# Patient Record
Sex: FEMALE | Race: BLACK OR AFRICAN AMERICAN | NOT HISPANIC OR LATINO | URBAN - METROPOLITAN AREA
[De-identification: names, ages, dates, MRNs, and addresses within clinical notes are randomized per-mention and may not be internally consistent; named-entity substitution may affect disease eponyms.]

---

## 2017-01-31 ENCOUNTER — OUTPATIENT (OUTPATIENT)
Dept: OUTPATIENT SERVICES | Facility: HOSPITAL | Age: 82
LOS: 1 days | Discharge: HOME | End: 2017-01-31

## 2017-02-04 DIAGNOSIS — M19.90 UNSPECIFIED OSTEOARTHRITIS, UNSPECIFIED SITE: ICD-10-CM

## 2017-02-04 DIAGNOSIS — M10.9 GOUT, UNSPECIFIED: ICD-10-CM

## 2017-02-04 DIAGNOSIS — Z87.891 PERSONAL HISTORY OF NICOTINE DEPENDENCE: ICD-10-CM

## 2017-02-04 DIAGNOSIS — H25.12 AGE-RELATED NUCLEAR CATARACT, LEFT EYE: ICD-10-CM

## 2017-02-04 DIAGNOSIS — I10 ESSENTIAL (PRIMARY) HYPERTENSION: ICD-10-CM

## 2017-09-16 ENCOUNTER — EMERGENCY (EMERGENCY)
Facility: HOSPITAL | Age: 82
LOS: 1 days | Discharge: HOME | End: 2017-09-16
Attending: EMERGENCY MEDICINE | Admitting: EMERGENCY MEDICINE
Payer: COMMERCIAL

## 2017-09-16 VITALS
SYSTOLIC BLOOD PRESSURE: 139 MMHG | RESPIRATION RATE: 16 BRPM | HEART RATE: 82 BPM | TEMPERATURE: 98 F | DIASTOLIC BLOOD PRESSURE: 58 MMHG | OXYGEN SATURATION: 100 %

## 2017-09-16 VITALS — RESPIRATION RATE: 18 BRPM | OXYGEN SATURATION: 99 %

## 2017-09-16 DIAGNOSIS — R00.2 PALPITATIONS: ICD-10-CM

## 2017-09-16 DIAGNOSIS — I10 ESSENTIAL (PRIMARY) HYPERTENSION: ICD-10-CM

## 2017-09-16 DIAGNOSIS — R35.8 OTHER POLYURIA: ICD-10-CM

## 2017-09-16 DIAGNOSIS — Z79.899 OTHER LONG TERM (CURRENT) DRUG THERAPY: ICD-10-CM

## 2017-09-16 DIAGNOSIS — R06.02 SHORTNESS OF BREATH: ICD-10-CM

## 2017-09-16 LAB
ALBUMIN SERPL ELPH-MCNC: 3.6 G/DL — SIGNIFICANT CHANGE UP (ref 3.3–5)
ALP SERPL-CCNC: 110 U/L — SIGNIFICANT CHANGE UP (ref 40–120)
ALT FLD-CCNC: 13 U/L — SIGNIFICANT CHANGE UP (ref 10–45)
ANION GAP SERPL CALC-SCNC: 16 MMOL/L — SIGNIFICANT CHANGE UP (ref 5–17)
APPEARANCE UR: CLEAR — SIGNIFICANT CHANGE UP
AST SERPL-CCNC: 33 U/L — SIGNIFICANT CHANGE UP (ref 10–40)
BASOPHILS NFR BLD AUTO: 0.4 % — SIGNIFICANT CHANGE UP (ref 0–2)
BILIRUB SERPL-MCNC: 0.2 MG/DL — SIGNIFICANT CHANGE UP (ref 0.2–1.2)
BILIRUB UR-MCNC: NEGATIVE — SIGNIFICANT CHANGE UP
BUN SERPL-MCNC: 18 MG/DL — SIGNIFICANT CHANGE UP (ref 7–23)
CALCIUM SERPL-MCNC: 9.2 MG/DL — SIGNIFICANT CHANGE UP (ref 8.4–10.5)
CHLORIDE SERPL-SCNC: 102 MMOL/L — SIGNIFICANT CHANGE UP (ref 96–108)
CO2 SERPL-SCNC: 21 MMOL/L — LOW (ref 22–31)
COLOR SPEC: YELLOW — SIGNIFICANT CHANGE UP
CREAT SERPL-MCNC: 0.87 MG/DL — SIGNIFICANT CHANGE UP (ref 0.5–1.3)
DIFF PNL FLD: NEGATIVE — SIGNIFICANT CHANGE UP
EOSINOPHIL NFR BLD AUTO: 0.9 % — SIGNIFICANT CHANGE UP (ref 0–6)
GLUCOSE SERPL-MCNC: 106 MG/DL — HIGH (ref 70–99)
GLUCOSE UR QL: NEGATIVE — SIGNIFICANT CHANGE UP
HCT VFR BLD CALC: 34.9 % — SIGNIFICANT CHANGE UP (ref 34.5–45)
HGB BLD-MCNC: 11.2 G/DL — LOW (ref 11.5–15.5)
KETONES UR-MCNC: NEGATIVE — SIGNIFICANT CHANGE UP
LEUKOCYTE ESTERASE UR-ACNC: (no result)
LYMPHOCYTES # BLD AUTO: 29.6 % — SIGNIFICANT CHANGE UP (ref 13–44)
MCHC RBC-ENTMCNC: 25.5 PG — LOW (ref 27–34)
MCHC RBC-ENTMCNC: 32.1 G/DL — SIGNIFICANT CHANGE UP (ref 32–36)
MCV RBC AUTO: 79.3 FL — LOW (ref 80–100)
MONOCYTES NFR BLD AUTO: 11.9 % — SIGNIFICANT CHANGE UP (ref 2–14)
NEUTROPHILS NFR BLD AUTO: 57.2 % — SIGNIFICANT CHANGE UP (ref 43–77)
NITRITE UR-MCNC: NEGATIVE — SIGNIFICANT CHANGE UP
NT-PROBNP SERPL-SCNC: 13 PG/ML — SIGNIFICANT CHANGE UP (ref 0–300)
PH UR: 6.5 — SIGNIFICANT CHANGE UP (ref 5–8)
PLATELET # BLD AUTO: 449 K/UL — HIGH (ref 150–400)
POTASSIUM SERPL-MCNC: 5.2 MMOL/L — SIGNIFICANT CHANGE UP (ref 3.5–5.3)
POTASSIUM SERPL-SCNC: 5.2 MMOL/L — SIGNIFICANT CHANGE UP (ref 3.5–5.3)
PROT SERPL-MCNC: 8.7 G/DL — HIGH (ref 6–8.3)
PROT UR-MCNC: NEGATIVE MG/DL — SIGNIFICANT CHANGE UP
RBC # BLD: 4.4 M/UL — SIGNIFICANT CHANGE UP (ref 3.8–5.2)
RBC # FLD: 17.7 % — HIGH (ref 10.3–16.9)
SODIUM SERPL-SCNC: 139 MMOL/L — SIGNIFICANT CHANGE UP (ref 135–145)
SP GR SPEC: <=1.005 — SIGNIFICANT CHANGE UP (ref 1–1.03)
TROPONIN T SERPL-MCNC: <0.01 NG/ML — SIGNIFICANT CHANGE UP (ref 0–0.01)
UROBILINOGEN FLD QL: 0.2 E.U./DL — SIGNIFICANT CHANGE UP
WBC # BLD: 8.2 K/UL — SIGNIFICANT CHANGE UP (ref 3.8–10.5)
WBC # FLD AUTO: 8.2 K/UL — SIGNIFICANT CHANGE UP (ref 3.8–10.5)

## 2017-09-16 PROCEDURE — 71046 X-RAY EXAM CHEST 2 VIEWS: CPT

## 2017-09-16 PROCEDURE — 80053 COMPREHEN METABOLIC PANEL: CPT

## 2017-09-16 PROCEDURE — 85025 COMPLETE CBC W/AUTO DIFF WBC: CPT

## 2017-09-16 PROCEDURE — 84484 ASSAY OF TROPONIN QUANT: CPT

## 2017-09-16 PROCEDURE — 93005 ELECTROCARDIOGRAM TRACING: CPT

## 2017-09-16 PROCEDURE — 83880 ASSAY OF NATRIURETIC PEPTIDE: CPT

## 2017-09-16 PROCEDURE — 93010 ELECTROCARDIOGRAM REPORT: CPT

## 2017-09-16 PROCEDURE — 99284 EMERGENCY DEPT VISIT MOD MDM: CPT | Mod: 25

## 2017-09-16 PROCEDURE — 87086 URINE CULTURE/COLONY COUNT: CPT

## 2017-09-16 PROCEDURE — 81001 URINALYSIS AUTO W/SCOPE: CPT

## 2017-09-16 PROCEDURE — 71020: CPT | Mod: 26

## 2017-09-16 PROCEDURE — 99285 EMERGENCY DEPT VISIT HI MDM: CPT | Mod: 25

## 2017-09-16 RX ORDER — SODIUM CHLORIDE 9 MG/ML
500 INJECTION INTRAMUSCULAR; INTRAVENOUS; SUBCUTANEOUS ONCE
Qty: 0 | Refills: 0 | Status: COMPLETED | OUTPATIENT
Start: 2017-09-16 | End: 2017-09-16

## 2017-09-16 RX ADMIN — SODIUM CHLORIDE 1000 MILLILITER(S): 9 INJECTION INTRAMUSCULAR; INTRAVENOUS; SUBCUTANEOUS at 14:54

## 2017-09-16 NOTE — ED ADULT NURSE NOTE - OBJECTIVE STATEMENT
Pt presents to ER c/o intermittent weakness x3-4 weeks with intermittent SOB x1 wk. Pt stated last night woke up to use the restroom and had palpitations with SOB. Pt denies pain, CP. No LE swelling. Pt also reports urinary frequency. NAD, will monitor and maintain safety.

## 2017-09-16 NOTE — ED PROVIDER NOTE - MEDICAL DECISION MAKING DETAILS
here w/ complaint of palpitations today, polyuria, intermittent SOB. Labs wnl. UA neg but culture sent. BS normal. pt very well appearing in ED, wants to go home. I feel like she is safe at this time. To walk into her PCP this week for further workup. Atypical for acs but ekg and trop neg, do not think warrants further workup.

## 2017-09-16 NOTE — ED PROVIDER NOTE - OBJECTIVE STATEMENT
86yo F hx of HTN, gout, here w/ complaint of intermittent SOB for the past 2 week, polyuria x1 week, and then palpitations today around 3am. Has had palpitations before, but states that the ones today were worse than usual. Feels dehydrated, is not sure why she is urinating so much. Denies dysuria. No hx of DM, but states that she was told that she is pre-dm. Called her PCP a few days ago to make appt but it was made for January. Was seen here about a month ago for pre syncope and had normal labs, CT head/CT-angio, and dc home as per pt request as she did not want an inpatient workup. 86yo F hx of HTN, gout, here w/ complaint of intermittent SOB for the past 2 week, polyuria x1 week, and then palpitations today around 3am. Has had palpitations before, but states that the ones today were worse than usual. Feels dehydrated, is not sure why she is urinating so much. Denies dysuria. No hx of DM, but states that she was told that she is pre-dm. Called her PCP a few days ago to make appt but it was made for January. Was seen here about a month ago for pre syncope and had normal labs, CT head/CT-angio, and dc home as per pt request as she did not want an inpatient workup. States that every morning almost she has palpitations in the setting of getting up quickly to go to the bathroom, which is what happened today as well. States that she has been told to slow down, but she still feels young and runs around everywhere. Lives alone but is very close to her next door neighbor, and family checks in on her very often. In addition she has HHA 5 hrs 5 days a week. Feels comfortable going home, and prefers to go home after workup if everything negative. Plans to walk in to clinic on Monday to f/u.

## 2017-09-17 LAB
CULTURE RESULTS: SIGNIFICANT CHANGE UP
SPECIMEN SOURCE: SIGNIFICANT CHANGE UP

## 2018-02-21 ENCOUNTER — OUTPATIENT (OUTPATIENT)
Dept: OUTPATIENT SERVICES | Facility: HOSPITAL | Age: 83
LOS: 1 days | End: 2018-02-21
Payer: MEDICARE

## 2018-02-21 DIAGNOSIS — R06.09 OTHER FORMS OF DYSPNEA: ICD-10-CM

## 2018-02-21 PROCEDURE — A9500: CPT

## 2018-02-21 PROCEDURE — A9505: CPT

## 2018-02-21 PROCEDURE — 78452 HT MUSCLE IMAGE SPECT MULT: CPT

## 2018-02-21 PROCEDURE — 93017 CV STRESS TEST TRACING ONLY: CPT

## 2018-04-21 ENCOUNTER — INPATIENT (INPATIENT)
Facility: HOSPITAL | Age: 83
LOS: 1 days | Discharge: HOME HEALTH | DRG: 312 | End: 2018-04-23
Attending: STUDENT IN AN ORGANIZED HEALTH CARE EDUCATION/TRAINING PROGRAM | Admitting: STUDENT IN AN ORGANIZED HEALTH CARE EDUCATION/TRAINING PROGRAM
Payer: MEDICARE

## 2018-04-21 VITALS
HEART RATE: 98 BPM | TEMPERATURE: 98 F | RESPIRATION RATE: 18 BRPM | DIASTOLIC BLOOD PRESSURE: 82 MMHG | OXYGEN SATURATION: 100 % | SYSTOLIC BLOOD PRESSURE: 130 MMHG

## 2018-04-21 DIAGNOSIS — E87.1 HYPO-OSMOLALITY AND HYPONATREMIA: ICD-10-CM

## 2018-04-21 DIAGNOSIS — I95.1 ORTHOSTATIC HYPOTENSION: ICD-10-CM

## 2018-04-21 DIAGNOSIS — Z29.9 ENCOUNTER FOR PROPHYLACTIC MEASURES, UNSPECIFIED: ICD-10-CM

## 2018-04-21 DIAGNOSIS — R53.1 WEAKNESS: ICD-10-CM

## 2018-04-21 DIAGNOSIS — R63.8 OTHER SYMPTOMS AND SIGNS CONCERNING FOOD AND FLUID INTAKE: ICD-10-CM

## 2018-04-21 DIAGNOSIS — S82.892A OTHER FRACTURE OF LEFT LOWER LEG, INITIAL ENCOUNTER FOR CLOSED FRACTURE: Chronic | ICD-10-CM

## 2018-04-21 DIAGNOSIS — M10.9 GOUT, UNSPECIFIED: ICD-10-CM

## 2018-04-21 DIAGNOSIS — D64.9 ANEMIA, UNSPECIFIED: ICD-10-CM

## 2018-04-21 DIAGNOSIS — I10 ESSENTIAL (PRIMARY) HYPERTENSION: ICD-10-CM

## 2018-04-21 DIAGNOSIS — R00.0 TACHYCARDIA, UNSPECIFIED: ICD-10-CM

## 2018-04-21 LAB
ALBUMIN SERPL ELPH-MCNC: 3.8 G/DL — SIGNIFICANT CHANGE UP (ref 3.3–5)
ALP SERPL-CCNC: 105 U/L — SIGNIFICANT CHANGE UP (ref 40–120)
ALT FLD-CCNC: 12 U/L — SIGNIFICANT CHANGE UP (ref 10–45)
ANION GAP SERPL CALC-SCNC: 13 MMOL/L — SIGNIFICANT CHANGE UP (ref 5–17)
ANION GAP SERPL CALC-SCNC: 15 MMOL/L — SIGNIFICANT CHANGE UP (ref 5–17)
APPEARANCE UR: CLEAR — SIGNIFICANT CHANGE UP
AST SERPL-CCNC: 21 U/L — SIGNIFICANT CHANGE UP (ref 10–40)
BASOPHILS NFR BLD AUTO: 0.2 % — SIGNIFICANT CHANGE UP (ref 0–2)
BILIRUB SERPL-MCNC: 0.2 MG/DL — SIGNIFICANT CHANGE UP (ref 0.2–1.2)
BILIRUB UR-MCNC: NEGATIVE — SIGNIFICANT CHANGE UP
BUN SERPL-MCNC: 14 MG/DL — SIGNIFICANT CHANGE UP (ref 7–23)
BUN SERPL-MCNC: 8 MG/DL — SIGNIFICANT CHANGE UP (ref 7–23)
CALCIUM SERPL-MCNC: 8.4 MG/DL — SIGNIFICANT CHANGE UP (ref 8.4–10.5)
CALCIUM SERPL-MCNC: 9.2 MG/DL — SIGNIFICANT CHANGE UP (ref 8.4–10.5)
CHLORIDE SERPL-SCNC: 106 MMOL/L — SIGNIFICANT CHANGE UP (ref 96–108)
CHLORIDE SERPL-SCNC: 97 MMOL/L — SIGNIFICANT CHANGE UP (ref 96–108)
CK MB CFR SERPL CALC: 1.4 NG/ML — SIGNIFICANT CHANGE UP (ref 0–6.7)
CK SERPL-CCNC: 59 U/L — SIGNIFICANT CHANGE UP (ref 25–170)
CK SERPL-CCNC: 66 U/L — SIGNIFICANT CHANGE UP (ref 25–170)
CK SERPL-CCNC: 74 U/L — SIGNIFICANT CHANGE UP (ref 25–170)
CO2 SERPL-SCNC: 19 MMOL/L — LOW (ref 22–31)
CO2 SERPL-SCNC: 20 MMOL/L — LOW (ref 22–31)
COLOR SPEC: YELLOW — SIGNIFICANT CHANGE UP
CREAT SERPL-MCNC: 0.86 MG/DL — SIGNIFICANT CHANGE UP (ref 0.5–1.3)
CREAT SERPL-MCNC: 0.88 MG/DL — SIGNIFICANT CHANGE UP (ref 0.5–1.3)
D DIMER BLD IA.RAPID-MCNC: 260 NG/ML DDU — HIGH
DIFF PNL FLD: (no result)
EOSINOPHIL NFR BLD AUTO: 0.5 % — SIGNIFICANT CHANGE UP (ref 0–6)
FERRITIN SERPL-MCNC: 11 NG/ML — LOW (ref 15–150)
GLUCOSE SERPL-MCNC: 138 MG/DL — HIGH (ref 70–99)
GLUCOSE SERPL-MCNC: 186 MG/DL — HIGH (ref 70–99)
GLUCOSE UR QL: NEGATIVE — SIGNIFICANT CHANGE UP
HCT VFR BLD CALC: 33.5 % — LOW (ref 34.5–45)
HGB BLD-MCNC: 10.4 G/DL — LOW (ref 11.5–15.5)
IRON SATN MFR SERPL: 22 UG/DL — LOW (ref 30–160)
IRON SATN MFR SERPL: 6 % — LOW (ref 14–50)
KETONES UR-MCNC: NEGATIVE — SIGNIFICANT CHANGE UP
LACTATE SERPL-SCNC: 1.7 MMOL/L — SIGNIFICANT CHANGE UP (ref 0.5–2)
LEUKOCYTE ESTERASE UR-ACNC: (no result)
LIDOCAIN IGE QN: 17 U/L — SIGNIFICANT CHANGE UP (ref 7–60)
LYMPHOCYTES # BLD AUTO: 23.9 % — SIGNIFICANT CHANGE UP (ref 13–44)
MAGNESIUM SERPL-MCNC: 1.8 MG/DL — SIGNIFICANT CHANGE UP (ref 1.6–2.6)
MCHC RBC-ENTMCNC: 22.5 PG — LOW (ref 27–34)
MCHC RBC-ENTMCNC: 31 G/DL — LOW (ref 32–36)
MCV RBC AUTO: 72.5 FL — LOW (ref 80–100)
MONOCYTES NFR BLD AUTO: 10.4 % — SIGNIFICANT CHANGE UP (ref 2–14)
NEUTROPHILS NFR BLD AUTO: 65 % — SIGNIFICANT CHANGE UP (ref 43–77)
NITRITE UR-MCNC: NEGATIVE — SIGNIFICANT CHANGE UP
NT-PROBNP SERPL-SCNC: 29 PG/ML — SIGNIFICANT CHANGE UP (ref 0–300)
OB PNL STL: NEGATIVE — SIGNIFICANT CHANGE UP
PH UR: 5.5 — SIGNIFICANT CHANGE UP (ref 5–8)
PLATELET # BLD AUTO: 511 K/UL — HIGH (ref 150–400)
POTASSIUM SERPL-MCNC: 3.7 MMOL/L — SIGNIFICANT CHANGE UP (ref 3.5–5.3)
POTASSIUM SERPL-MCNC: 4.3 MMOL/L — SIGNIFICANT CHANGE UP (ref 3.5–5.3)
POTASSIUM SERPL-SCNC: 3.7 MMOL/L — SIGNIFICANT CHANGE UP (ref 3.5–5.3)
POTASSIUM SERPL-SCNC: 4.3 MMOL/L — SIGNIFICANT CHANGE UP (ref 3.5–5.3)
PROT SERPL-MCNC: 8.9 G/DL — HIGH (ref 6–8.3)
PROT UR-MCNC: NEGATIVE MG/DL — SIGNIFICANT CHANGE UP
RAPID RVP RESULT: SIGNIFICANT CHANGE UP
RBC # BLD: 4.62 M/UL — SIGNIFICANT CHANGE UP (ref 3.8–5.2)
RBC # FLD: 18.3 % — HIGH (ref 10.3–16.9)
SODIUM SERPL-SCNC: 132 MMOL/L — LOW (ref 135–145)
SODIUM SERPL-SCNC: 138 MMOL/L — SIGNIFICANT CHANGE UP (ref 135–145)
SP GR SPEC: 1.01 — SIGNIFICANT CHANGE UP (ref 1–1.03)
TIBC SERPL-MCNC: 355 UG/DL — SIGNIFICANT CHANGE UP (ref 220–430)
TROPONIN T SERPL-MCNC: <0.01 NG/ML — SIGNIFICANT CHANGE UP (ref 0–0.01)
UIBC SERPL-MCNC: 333 UG/DL — SIGNIFICANT CHANGE UP (ref 110–370)
UROBILINOGEN FLD QL: 0.2 E.U./DL — SIGNIFICANT CHANGE UP
WBC # BLD: 9.8 K/UL — SIGNIFICANT CHANGE UP (ref 3.8–10.5)
WBC # FLD AUTO: 9.8 K/UL — SIGNIFICANT CHANGE UP (ref 3.8–10.5)

## 2018-04-21 PROCEDURE — 71045 X-RAY EXAM CHEST 1 VIEW: CPT | Mod: 26

## 2018-04-21 PROCEDURE — 99223 1ST HOSP IP/OBS HIGH 75: CPT | Mod: GC

## 2018-04-21 PROCEDURE — 93010 ELECTROCARDIOGRAM REPORT: CPT

## 2018-04-21 PROCEDURE — 99285 EMERGENCY DEPT VISIT HI MDM: CPT | Mod: 25

## 2018-04-21 RX ORDER — SODIUM CHLORIDE 9 MG/ML
1000 INJECTION INTRAMUSCULAR; INTRAVENOUS; SUBCUTANEOUS
Qty: 0 | Refills: 0 | Status: DISCONTINUED | OUTPATIENT
Start: 2018-04-21 | End: 2018-04-21

## 2018-04-21 RX ORDER — ONDANSETRON 8 MG/1
4 TABLET, FILM COATED ORAL ONCE
Qty: 0 | Refills: 0 | Status: COMPLETED | OUTPATIENT
Start: 2018-04-21 | End: 2018-04-21

## 2018-04-21 RX ORDER — LANOLIN ALCOHOL/MO/W.PET/CERES
1 CREAM (GRAM) TOPICAL AT BEDTIME
Qty: 0 | Refills: 0 | Status: DISCONTINUED | OUTPATIENT
Start: 2018-04-21 | End: 2018-04-23

## 2018-04-21 RX ORDER — SODIUM CHLORIDE 9 MG/ML
1000 INJECTION INTRAMUSCULAR; INTRAVENOUS; SUBCUTANEOUS
Qty: 0 | Refills: 0 | Status: DISCONTINUED | OUTPATIENT
Start: 2018-04-21 | End: 2018-04-23

## 2018-04-21 RX ORDER — FERROUS SULFATE 325(65) MG
325 TABLET ORAL DAILY
Qty: 0 | Refills: 0 | Status: DISCONTINUED | OUTPATIENT
Start: 2018-04-21 | End: 2018-04-21

## 2018-04-21 RX ORDER — HEPARIN SODIUM 5000 [USP'U]/ML
5000 INJECTION INTRAVENOUS; SUBCUTANEOUS EVERY 8 HOURS
Qty: 0 | Refills: 0 | Status: DISCONTINUED | OUTPATIENT
Start: 2018-04-21 | End: 2018-04-23

## 2018-04-21 RX ORDER — FERROUS SULFATE 325(65) MG
325 TABLET ORAL THREE TIMES A DAY
Qty: 0 | Refills: 0 | Status: DISCONTINUED | OUTPATIENT
Start: 2018-04-21 | End: 2018-04-23

## 2018-04-21 RX ORDER — MAGNESIUM SULFATE 500 MG/ML
1 VIAL (ML) INJECTION ONCE
Qty: 0 | Refills: 0 | Status: COMPLETED | OUTPATIENT
Start: 2018-04-21 | End: 2018-04-21

## 2018-04-21 RX ORDER — SODIUM CHLORIDE 9 MG/ML
1000 INJECTION INTRAMUSCULAR; INTRAVENOUS; SUBCUTANEOUS ONCE
Qty: 0 | Refills: 0 | Status: COMPLETED | OUTPATIENT
Start: 2018-04-21 | End: 2018-04-21

## 2018-04-21 RX ORDER — CEFTRIAXONE 500 MG/1
1 INJECTION, POWDER, FOR SOLUTION INTRAMUSCULAR; INTRAVENOUS ONCE
Qty: 0 | Refills: 0 | Status: COMPLETED | OUTPATIENT
Start: 2018-04-21 | End: 2018-04-21

## 2018-04-21 RX ORDER — POLYETHYLENE GLYCOL 3350 17 G/17G
17 POWDER, FOR SOLUTION ORAL
Qty: 0 | Refills: 0 | Status: DISCONTINUED | OUTPATIENT
Start: 2018-04-21 | End: 2018-04-23

## 2018-04-21 RX ADMIN — Medication 1 MILLIGRAM(S): at 22:37

## 2018-04-21 RX ADMIN — ONDANSETRON 4 MILLIGRAM(S): 8 TABLET, FILM COATED ORAL at 03:51

## 2018-04-21 RX ADMIN — SODIUM CHLORIDE 150 MILLILITER(S): 9 INJECTION INTRAMUSCULAR; INTRAVENOUS; SUBCUTANEOUS at 04:56

## 2018-04-21 RX ADMIN — Medication 325 MILLIGRAM(S): at 11:55

## 2018-04-21 RX ADMIN — CEFTRIAXONE 100 GRAM(S): 500 INJECTION, POWDER, FOR SOLUTION INTRAMUSCULAR; INTRAVENOUS at 02:36

## 2018-04-21 RX ADMIN — Medication 325 MILLIGRAM(S): at 22:37

## 2018-04-21 RX ADMIN — SODIUM CHLORIDE 500 MILLILITER(S): 9 INJECTION INTRAMUSCULAR; INTRAVENOUS; SUBCUTANEOUS at 01:21

## 2018-04-21 RX ADMIN — HEPARIN SODIUM 5000 UNIT(S): 5000 INJECTION INTRAVENOUS; SUBCUTANEOUS at 22:43

## 2018-04-21 RX ADMIN — POLYETHYLENE GLYCOL 3350 17 GRAM(S): 17 POWDER, FOR SOLUTION ORAL at 18:24

## 2018-04-21 RX ADMIN — Medication 100 GRAM(S): at 18:24

## 2018-04-21 NOTE — PHYSICAL THERAPY INITIAL EVALUATION ADULT - IMPAIRED TRANSFERS: SIT/STAND, REHAB EVAL
impaired balance/Pt reported slight SOB in standing, HR increased from 102bpm to 121bpm/decreased strength

## 2018-04-21 NOTE — ED PROVIDER NOTE - PHYSICAL EXAMINATION
CON: ao x 3, HENMT: clear oropharynx, soft neck, HEAD: atraumatic, CV: rrr, equal pulses b/l, RESP: cta b/l, GI: +BS, soft, nontender, no rebound, no guarding, SKIN: no rash, MSK: no deformities, NEURO: no gross motor or sensory deficit, ambulating w/ cane

## 2018-04-21 NOTE — H&P ADULT - PROBLEM SELECTOR PLAN 4
History of hypertension, takes unknown dose of metoprolol and amlodipine, both of which were taken prior to coming to ER. Patient normotensive on exam and significantly orthostatic.   - hold both home antihypertensives in setting of orthostasis  - obtain collateral in AM regarding medication dose Microcytic anemia, progressive over the past year.  Stool FOBT negative, patient denies BRBPR or melena.    - iron panel sent, will f/u  - no need for transfusion at this time, so signs/symptoms of active bleeding

## 2018-04-21 NOTE — PHYSICAL THERAPY INITIAL EVALUATION ADULT - PLANNED THERAPY INTERVENTIONS, PT EVAL
balance training/gait training/motor coordination training/strengthening/bed mobility training/neuromuscular re-education/transfer training

## 2018-04-21 NOTE — H&P ADULT - PROBLEM SELECTOR PLAN 8
Low risk for VTE based on IMPROVE model, no pharmacoprophylaxis indicated  fall precautions  PT consult in AM

## 2018-04-21 NOTE — PROGRESS NOTE ADULT - PROBLEM SELECTOR PLAN 4
Microcytic anemia, progressive over the past year.  Stool FOBT negative, patient denies BRBPR or melena.    - iron panel sent, will f/u  - no need for transfusion at this time, so signs/symptoms of active bleeding

## 2018-04-21 NOTE — PROGRESS NOTE ADULT - PROBLEM SELECTOR PLAN 1
Patient presenting with 2 days significant weakness, with near-syncopal event today which prompted patient to call EMS.  Significantly dry on exam, orthostatic positive, so weakness with some dyspnea likely 2/2 profound hypovolemia.  Low suspicion of infectious etiology, denying urinary symptoms, fevers, chills, or other signs/symptoms of infection.  RVP negative. Unlikely Cardiac etiology as had stress test in March and Holter for 7 days.   -IVNS at 100cc/hr   - encourage PO intake  - PT consult: likely SHEN, became tachycardic when walking  - f/u TSH

## 2018-04-21 NOTE — H&P ADULT - NSHPPHYSICALEXAM_GEN_ALL_CORE
Vital Signs Last 24 Hrs  T(C): 36.7 (21 Apr 2018 00:29), Max: 36.7 (21 Apr 2018 00:29)  T(F): 98 (21 Apr 2018 00:29), Max: 98 (21 Apr 2018 00:29)  HR: 98 (21 Apr 2018 00:29) (98 - 98)  BP: 130/82 (21 Apr 2018 00:29) (130/82 - 130/82)  BP(mean): --  RR: 18 (21 Apr 2018 00:29) (18 - 18)  SpO2: 100% (21 Apr 2018 00:29) (100% - 100%)    General: NAD, comfortable  HEENT: NCAT, PERRL, clear conjunctiva, no scleral icterus, dry mucous membranes  Neck: supple, no JVD  Respiratory: CTA b/l, good air entry b/l, no wheezing, rhonchi, rales  Cardiovascular: tachycardic, regular rhythm, normal S1S2, no M/R/G  Abdomen: soft, NT/ND, bowel sounds in all four quadrants, no palpable masses  Extremities: WWP, no clubbing, cyanosis, or edema  Neurological: awake, alert, oriented to person, place, and time, recent and remote memory grossly intact, CN II-XII intact  Musculoskeletal: Normal bulk and tone, strength 5/5 in bilateral upper extremities, 5/5 RLE, 4+/5 LLE.  strength 5/5.

## 2018-04-21 NOTE — H&P ADULT - PROBLEM SELECTOR PLAN 3
Microcytic anemia, progressive over the past year.  Stool FOBT negative, patient denies BRBPR or melena.    - iron panel sent, will f/u  - no need for transfusion at this time, so signs/symptoms of active bleeding Likely hypovolemic hyponatremia as patient hypovolemic on exam.  Symptoms unlikely to be from hyponatremia as Na 132.   - IVNS at 150cc/hr  - recheck in AM

## 2018-04-21 NOTE — H&P ADULT - ATTENDING COMMENTS
patient seen and examined; feeling better than when fis    reviewed vs, labs, available radiological reports/ studies,     agree w/ PE findings as above w/ additions/ exceptions/ pertinent findings: elderly female, NAD, awake, alert, MMM at time of exam, 5/5 upper and lower ext motor strength b/l, rest of exam as above     1. weakness/ orthostatic hypotension:  weakness resolved, check repeat orthostatics; followup Na+ and Hb levels. follow up PT recs    rest of plan as above     2. patient seen and examined; feeling better than when fis    reviewed vs, labs, available radiological reports/ studies,     agree w/ PE findings as above w/ additions/ exceptions/ pertinent findings: elderly female, NAD, awake, alert, b/l surgical pupils ;  MMM at time of exam, 5/5 upper and lower ext motor strength b/l, rest of exam as above     1. weakness/ orthostatic hypotension:  weakness resolved, check repeat orthostatics; followup Na+ and Hb levels. follow up PT recs    rest of plan as above     2.

## 2018-04-21 NOTE — H&P ADULT - PROBLEM SELECTOR PLAN 5
History of gout, on unknown dose of allopurinol.   - obtain collateral regarding home allopurinol dose and restart in morning History of hypertension, takes unknown dose of metoprolol and amlodipine, both of which were taken prior to coming to ER. Patient normotensive on exam and significantly orthostatic.   - hold both home antihypertensives in setting of orthostasis  - obtain collateral in AM regarding medication dose

## 2018-04-21 NOTE — PHYSICAL THERAPY INITIAL EVALUATION ADULT - IMPAIRMENTS CONTRIBUTING TO GAIT DEVIATIONS, PT EVAL
impaired balance/Endurance, pt reporting increased SOB, slight M-L sway, pt reprots feeling HR increase and feeling heavy, HR 133bpm post ambulation/decreased strength

## 2018-04-21 NOTE — PHYSICAL THERAPY INITIAL EVALUATION ADULT - ADDITIONAL COMMENTS
Pt lives in elevator apartment with ramp to enter. Pt states she has HHA 5 days per week for 5 hours per day. Pt has niece and grandson come assist her on weekend if she needs help. Pt's HHA does laundry, cleaning, and assists her with showering and buttoning shirts. Pt states she could ambulate ~ 3 blocks with SC last week.

## 2018-04-21 NOTE — H&P ADULT - HISTORY OF PRESENT ILLNESS
88 year old female with PMH gout, HTN, sciatica, blind in R eye, presenting with generalized weakness x 2 days.  Patient has been having mild generalized weakness for the past few months, but the past two days the weakness became significantly increased.  States that the weakness is most pronounced when she tries to walk, and she has developed mild SOB when walking, also over two days. She has had some dizziness for the past two days. 88 year old female with PMH gout, HTN, sciatica, blind in R eye, presenting with generalized weakness x 2 days.  Patient has been having mild generalized weakness for the past few months, but the past two days the weakness became significantly increased.  States that the weakness is most pronounced when she tries to walk, and she has developed mild SOB when walking, also over two days. She has had some dizziness for the past two days. Today she had an episode where she was in the bathroom then felt her heart start to race and felt as though she was going to fall or pass out. Denies any vision changes, diaphoresis, or change in hearing. Denies any other symptoms. Denies any fevers, chills, chest pain, palpitations, SOB at rest, abdominal pain, constipation, diarrhea, dysuria, increased urination, difficulty urinating. States her urine has been slightly darker the past two days.  Took her home amlodipine and metoprolol today, though she does not know the dose. Lives alone, has a HHA 5 hours per day, 5 days per week who does all her cooking. Patient does not eat prior to HHA arrival, and the HHA makes her dinner before she leaves. Her niece or a neighbor will bring food during the weekends, or she gets delivery. Thinks she drinks 3-4 glasses of water per day.     In the ER, her vitals were as follows: T 98, HR 98, /82, rr 18, O2 sat 100% on RA.  She was given ceftriaxone 1g IV and 1L NS.

## 2018-04-21 NOTE — H&P ADULT - PROBLEM SELECTOR PLAN 7
Low risk for VTE based on IMPROVE model, no pharmacoprophylaxis indicated  fall precautions  PT consult in AM Regular diet  IVF at 150cc/hr  Monitor and replete serum electrolytes as needed

## 2018-04-21 NOTE — H&P ADULT - NSHPSOCIALHISTORY_GEN_ALL_CORE
Lives alone  Has HHA 5 hrs per day, 5 days per week  Uses walker at baseline  Former smoker (quit 14 years ago, 25 pack-year history)  No EtOH or other drug use

## 2018-04-21 NOTE — PROGRESS NOTE ADULT - PROBLEM SELECTOR PLAN 5
History of hypertension, takes unknown dose of metoprolol and amlodipine, both of which were taken prior to coming to ER. Patient normotensive on exam and significantly orthostatic.   - hold both home antihypertensives in setting of orthostasis  - obtain collateral regarding medication dose

## 2018-04-21 NOTE — ED PROVIDER NOTE - CARE PLAN
Principal Discharge DX:	Weakness Principal Discharge DX:	Weakness  Secondary Diagnosis:	Hyponatremia

## 2018-04-21 NOTE — H&P ADULT - PROBLEM SELECTOR PLAN 2
Likely hypovolemic hyponatremia as patient hypovolemic on exam.  Symptoms unlikely to be from hyponatremia as Na 132.   - IVNS at 150cc/hr  - recheck in AM see above

## 2018-04-21 NOTE — H&P ADULT - PROBLEM SELECTOR PLAN 6
Regular diet  IVF at 150cc/hr  Monitor and replete serum electrolytes as needed History of gout, on unknown dose of allopurinol.   - obtain collateral regarding home allopurinol dose and restart in morning

## 2018-04-21 NOTE — H&P ADULT - NSHPREVIEWOFSYSTEMS_GEN_ALL_CORE
Review of Systems:  Constitutional: No fever or weight loss  Eyes: No eye pain, visual disturbances, or discharge. Has no vision in R eye   ENMT:  No sinus or throat pain, no rhinorrhea, no dysphagia  Neck: No pain or stiffness  Respiratory: No cough, wheezing, chills or hemoptysis  Cardiovascular: No chest pain, palpitations, or leg swelling. + SCHRADER,   Gastrointestinal: No abdominal pain. No nausea, vomiting or hematemesis; No diarrhea or constipation.   Genitourinary: No dysuria, frequency, hematuria or incontinence  Neurological: No falls, no LOC, no confusion  Skin: No itching, burning, rashes or lesions   Endocrine: No heat or cold intolerance

## 2018-04-21 NOTE — PROGRESS NOTE ADULT - PROBLEM SELECTOR PLAN 3
Likely hypovolemic hyponatremia as patient hypovolemic on exam.  Symptoms unlikely to be from hyponatremia as Na 132.   - IVNS at 150cc/hr  - recheck 8PM

## 2018-04-21 NOTE — ED PROVIDER NOTE - MEDICAL DECISION MAKING DETAILS
feeling generalized weak, sob though no leg swelling, recent echo and stress test wnl, ?infectious process? will check labs, cultures, screening ekg

## 2018-04-21 NOTE — PROGRESS NOTE ADULT - PROBLEM SELECTOR PLAN 6
History of gout, on unknown dose of allopurinol.   - obtain collateral regarding home allopurinol dose

## 2018-04-21 NOTE — H&P ADULT - NSHPLABSRESULTS_GEN_ALL_CORE
10.4   9.8   )-----------( 511      ( 2018 01:09 )             33.5         132<L>  |  97  |  14  ----------------------------<  138<H>  4.3   |  20<L>  |  0.86    Ca    9.2      2018 01:09    TPro  8.9<H>  /  Alb  3.8  /  TBili  0.2  /  DBili  x   /  AST  21  /  ALT  12  /  AlkPhos  105  -    Lactate, Blood (18 @ 01:09)    Lactate, Blood: 1.7 mmoL/L    Creatine Kinase, Serum (18 @ 01:09)    Creatine Kinase, Serum: 74 U/L    Troponin T, Serum (18 @ 01:09)    Troponin T, Serum: <0.01    Serum Pro-Brain Natriuretic Peptide (18 @ 01:09)    Serum Pro-Brain Natriuretic Peptide: 18    Rapid Respiratory Viral Panel (18 @ 02:16)    Rapid RVP Result: [facility]    Urinalysis Basic - ( 2018 01:31 )    Color: Yellow / Appearance: Clear / S.015 / pH: x  Gluc: x / Ketone: NEGATIVE  / Bili: Negative / Urobili: 0.2 E.U./dL   Blood: x / Protein: NEGATIVE mg/dL / Nitrite: NEGATIVE   Leuk Esterase: Small / RBC: < 5 /HPF / WBC 5-10 /HPF   Sq Epi: x / Non Sq Epi: 0-5 /HPF / Bacteria: Present /HPF

## 2018-04-21 NOTE — H&P ADULT - ASSESSMENT
88 year old female with PMH gout, HTN, sciatica, blind R eye, presenting with generalized weakness, found to be orthostatic positive and dry on exam

## 2018-04-21 NOTE — H&P ADULT - PROBLEM SELECTOR PLAN 1
Patient presenting with 2 days significant weakness, with near-syncopal event today which prompted patient to call EMS.  Significantly dry on exam, orthostatic positive, so weakness with some dyspnea likely 2/2 profound hypovolemia.  Low suspicion of infectious etiology, denying urinary symptoms, fevers, chills, or other signs/symptoms of infection.  RVP negative.   - s/p 1L NS in ER, will continue IVNS at 150cc/hr   - encourage PO intake  - PT consult in AM  - f/u TSH

## 2018-04-21 NOTE — ED PROVIDER NOTE - OBJECTIVE STATEMENT
88 yof pw feeling generalized weakness x 2 days, also feels sob but no cough, no fc, no cp, no abd pain, no nv, no focal weakness, no unsteadiness, no urinary sx, no back pain.  recent stress/echo wnl.  no leg swelling.  no hx of CHF.  no black or bloody stool.  no HA, no fall.

## 2018-04-21 NOTE — PHYSICAL THERAPY INITIAL EVALUATION ADULT - PERTINENT HX OF CURRENT PROBLEM, REHAB EVAL
As per chart: 88 year old female with PMH gout, HTN, sciatica, blind in R eye, presenting with generalized weakness x 2 days.  Patient has been having mild generalized weakness for the past few months, but the past two days the weakness became significantly increased.  States that the weakness is most pronounced when she tries to walk, and she has developed mild SOB when walking, also over two day

## 2018-04-21 NOTE — PROGRESS NOTE ADULT - ATTENDING COMMENTS
Pt seen and examined. agree with resident assessment and plan with following addendum.    87 yo F admitted for weakness and dehydration    # Weakness/Dehydration/SOB  - improving with IVF  - cont NS at 100cc/hr X 10 hrs then dc fluids  - no acute renal failure  - when walked with PT became tachycardic very quickly  - lower suspicion for PE but given pt complaints of SOB with minimal exertion, tachycardia, normal stress test and holter monitor as outpatient, check d-dimer.  If elevated would get CTA chest to rule out PE.    - EKG NSR, rate around 100, no ischemic changes  - check orthostatics daily  - PT eval

## 2018-04-21 NOTE — PROGRESS NOTE ADULT - SUBJECTIVE AND OBJECTIVE BOX
INTERVAL HPI/OVERNIGHT EVENTS:    OVERNIGHT: No overnight events.  SUBJECTIVE: Patient seen and examined at bedside. Has no complaints. Just feels weak when walking.     ROS:  CV: Denies chest pain  Resp: Denies SOB  GI: Denies abdominal pain, constipation, diarrhea, nausea, vomiting  : Denies dysuria  ID: Denies fevers, chills  MSK: Denies joint pain     OBJECTIVE:    VITAL SIGNS:  ICU Vital Signs Last 24 Hrs  T(C): 37.2 (2018 11:57), Max: 37.2 (2018 05:39)  T(F): 98.9 (2018 11:57), Max: 99 (2018 05:39)  HR: 98 (2018 11:57) (97 - 133)  BP: 126/86 (2018 11:57) (126/86 - 179/96)  BP(mean): --  ABP: --  ABP(mean): --  RR: 16 (2018 11:57) (16 - 18)  SpO2: 100% (2018 11:57) (95% - 100%)         @ 07:  -   @ 07:00  --------------------------------------------------------  IN: 300 mL / OUT: 400 mL / NET: -100 mL     @ 07: @ 17:24  --------------------------------------------------------  IN: 1200 mL / OUT: 0 mL / NET: 1200 mL      CAPILLARY BLOOD GLUCOSE          PHYSICAL EXAM:    General: NAD, comfortable  HEENT: NCAT, PERRL, clear conjunctiva, no scleral icterus  Neck: supple, no JVD  Respiratory: CTA b/l, no wheezing, rhonchi, rales  Cardiovascular: RRR, normal S1S2, no M/R/G  Abdomen: soft, NT/ND, bowel sounds in all four quadrants, no palpable masses  Extremities: WWP, no clubbing, cyanosis, trace edema  Neuro: grossly no focal deficits     MEDICATIONS:  MEDICATIONS  (STANDING):  ferrous    sulfate 325 milliGRAM(s) Oral daily  sodium chloride 0.9%. 1000 milliLiter(s) (100 mL/Hr) IV Continuous <Continuous>    MEDICATIONS  (PRN):      ALLERGIES:  Allergies    No Known Allergies    Intolerances        LABS:                        10.4   9.8   )-----------( 511      ( 2018 01:09 )             33.5     -    132<L>  |  97  |  14  ----------------------------<  138<H>  4.3   |  20<L>  |  0.86    Ca    9.2      2018 01:09  Mg     1.8     -    TPro  8.9<H>  /  Alb  3.8  /  TBili  0.2  /  DBili  x   /  AST  21  /  ALT  12  /  AlkPhos  105  -      Urinalysis Basic - ( 2018 01:31 )    Color: Yellow / Appearance: Clear / S.015 / pH: x  Gluc: x / Ketone: NEGATIVE  / Bili: Negative / Urobili: 0.2 E.U./dL   Blood: x / Protein: NEGATIVE mg/dL / Nitrite: NEGATIVE   Leuk Esterase: Small / RBC: < 5 /HPF / WBC 5-10 /HPF   Sq Epi: x / Non Sq Epi: 0-5 /HPF / Bacteria: Present /HPF        RADIOLOGY & ADDITIONAL TESTS: Reviewed.

## 2018-04-21 NOTE — PROGRESS NOTE ADULT - ASSESSMENT
88 year old female with PMH gout, HTN, sciatica, blind R eye, presenting with generalized weakness, found to be orthostatic positive and dry on exam, and tachycardic when walking, admitted for inability to ambulate.

## 2018-04-22 LAB
ANION GAP SERPL CALC-SCNC: 12 MMOL/L — SIGNIFICANT CHANGE UP (ref 5–17)
BUN SERPL-MCNC: 7 MG/DL — SIGNIFICANT CHANGE UP (ref 7–23)
CALCIUM SERPL-MCNC: 8.5 MG/DL — SIGNIFICANT CHANGE UP (ref 8.4–10.5)
CHLORIDE SERPL-SCNC: 108 MMOL/L — SIGNIFICANT CHANGE UP (ref 96–108)
CO2 SERPL-SCNC: 21 MMOL/L — LOW (ref 22–31)
CREAT SERPL-MCNC: 0.84 MG/DL — SIGNIFICANT CHANGE UP (ref 0.5–1.3)
CULTURE RESULTS: SIGNIFICANT CHANGE UP
GLUCOSE SERPL-MCNC: 111 MG/DL — HIGH (ref 70–99)
HCT VFR BLD CALC: 28.5 % — LOW (ref 34.5–45)
HGB BLD-MCNC: 8.5 G/DL — LOW (ref 11.5–15.5)
MAGNESIUM SERPL-MCNC: 2 MG/DL — SIGNIFICANT CHANGE UP (ref 1.6–2.6)
MCHC RBC-ENTMCNC: 22.4 PG — LOW (ref 27–34)
MCHC RBC-ENTMCNC: 29.8 G/DL — LOW (ref 32–36)
MCV RBC AUTO: 75.2 FL — LOW (ref 80–100)
PLATELET # BLD AUTO: 439 K/UL — HIGH (ref 150–400)
POTASSIUM SERPL-MCNC: 4.1 MMOL/L — SIGNIFICANT CHANGE UP (ref 3.5–5.3)
POTASSIUM SERPL-SCNC: 4.1 MMOL/L — SIGNIFICANT CHANGE UP (ref 3.5–5.3)
RBC # BLD: 3.79 M/UL — LOW (ref 3.8–5.2)
RBC # FLD: 18.8 % — HIGH (ref 10.3–16.9)
SODIUM SERPL-SCNC: 141 MMOL/L — SIGNIFICANT CHANGE UP (ref 135–145)
SPECIMEN SOURCE: SIGNIFICANT CHANGE UP
WBC # BLD: 6.8 K/UL — SIGNIFICANT CHANGE UP (ref 3.8–10.5)
WBC # FLD AUTO: 6.8 K/UL — SIGNIFICANT CHANGE UP (ref 3.8–10.5)

## 2018-04-22 PROCEDURE — 99233 SBSQ HOSP IP/OBS HIGH 50: CPT | Mod: GC

## 2018-04-22 RX ADMIN — Medication 1 MILLIGRAM(S): at 22:14

## 2018-04-22 RX ADMIN — Medication 325 MILLIGRAM(S): at 22:13

## 2018-04-22 RX ADMIN — Medication 325 MILLIGRAM(S): at 14:23

## 2018-04-22 RX ADMIN — Medication 325 MILLIGRAM(S): at 06:39

## 2018-04-22 NOTE — DISCHARGE NOTE ADULT - CARE PROVIDER_API CALL
[facility]: Duc [facility] MD  No reviews · [facility]  [address]  [phone]  Phone: (   )    -  Fax: (   )    -

## 2018-04-22 NOTE — DISCHARGE NOTE ADULT - MEDICATION SUMMARY - MEDICATIONS TO STOP TAKING
I will STOP taking the medications listed below when I get home from the hospital:    metoprolol    amLODIPine

## 2018-04-22 NOTE — DISCHARGE NOTE ADULT - PLAN OF CARE
To improve your blood counts On You have orthostatic hypotension which was likely a result of anemia and your symptoms of weakness. Please continue to follow up with your primary care provider. On this admission, you came in for weakness. You improved with IV fluids and adequate resuscitation. PT evaluated you and recommended home physical therapy. Please continue to eat healthy. Take all of your iron pills and follow up with your primary care provider. On this admission, your blood counts were low which likely was the cause of your weakness. You were found to have iron deficiency anemia. We started you on iron pills to be taken three times a day. Please continue to take these and follow up with your primary care provider in 7-10 days. You have a history of hypertension. Your blood pressures were low on this admission likely due to your anemia. They are now normal. Please continue your home medications and follow up with your primary care doctor within the week. On this admission, your blood counts were low. Your iron was borderline Low. You should purchase some over the counter iron supplements and take it twice a day. It might make you constipated and take an over the counter stool softener/ laxative as needed. Follow-up with your primary care doctor. You have a history of hypertension. We are stopping your blood pressure medications because your blood pressures have been normal. Follow-up with your primary care doctor for blood pressure checks and whether you require blood pressure medications in the future. You have orthostatic hypotension (low blood pressure when standing) which was likely a result of dehydration toms of weakness. Continue to eat and drink well to prevent getting dehydrated. On this admission, you came in for weakness. You improved with IV fluids and adequate resuscitation. Physical Therapy evaluated you and recommended home physical therapy.

## 2018-04-22 NOTE — DISCHARGE NOTE ADULT - CARE PLAN
Principal Discharge DX:	Anemia  Secondary Diagnosis:	HTN (hypertension)  Secondary Diagnosis:	Orthostatic hypotension  Secondary Diagnosis:	Weakness  Secondary Diagnosis:	Prophylactic measure Principal Discharge DX:	Anemia  Goal:	To improve your blood counts  Assessment and plan of treatment:	On  Secondary Diagnosis:	HTN (hypertension)  Secondary Diagnosis:	Orthostatic hypotension  Secondary Diagnosis:	Weakness  Secondary Diagnosis:	Prophylactic measure Principal Discharge DX:	Anemia  Goal:	To improve your blood counts  Assessment and plan of treatment:	On this admission, your blood counts were low which likely was the cause of your weakness. You were found to have iron deficiency anemia. We started you on iron pills to be taken three times a day. Please continue to take these and follow up with your primary care provider in 7-10 days.  Secondary Diagnosis:	HTN (hypertension)  Assessment and plan of treatment:	You have a history of hypertension. Your blood pressures were low on this admission likely due to your anemia. They are now normal. Please continue your home medications and follow up with your primary care doctor within the week.  Secondary Diagnosis:	Orthostatic hypotension  Assessment and plan of treatment:	You have orthostatic hypotension which was likely a result of anemia and your symptoms of weakness. Please continue to follow up with your primary care provider.  Secondary Diagnosis:	Weakness  Assessment and plan of treatment:	On this admission, you came in for weakness. You improved with IV fluids and adequate resuscitation. PT evaluated you and recommended home physical therapy.  Secondary Diagnosis:	Prophylactic measure  Assessment and plan of treatment:	Please continue to eat healthy. Take all of your iron pills and follow up with your primary care provider. Principal Discharge DX:	Anemia  Goal:	To improve your blood counts  Assessment and plan of treatment:	On this admission, your blood counts were low. Your iron was borderline Low. You should purchase some over the counter iron supplements and take it twice a day. It might make you constipated and take an over the counter stool softener/ laxative as needed. Follow-up with your primary care doctor.  Secondary Diagnosis:	HTN (hypertension)  Assessment and plan of treatment:	You have a history of hypertension. We are stopping your blood pressure medications because your blood pressures have been normal. Follow-up with your primary care doctor for blood pressure checks and whether you require blood pressure medications in the future.  Secondary Diagnosis:	Orthostatic hypotension  Assessment and plan of treatment:	You have orthostatic hypotension (low blood pressure when standing) which was likely a result of dehydration toms of weakness. Continue to eat and drink well to prevent getting dehydrated.  Secondary Diagnosis:	Weakness  Assessment and plan of treatment:	On this admission, you came in for weakness. You improved with IV fluids and adequate resuscitation. Physical Therapy evaluated you and recommended home physical therapy.  Secondary Diagnosis:	Prophylactic measure  Assessment and plan of treatment:	Please continue to eat healthy. Take all of your iron pills and follow up with your primary care provider.

## 2018-04-22 NOTE — DISCHARGE NOTE ADULT - MEDICATION SUMMARY - MEDICATIONS TO TAKE
I will START or STAY ON the medications listed below when I get home from the hospital:    allopurinol  -- Indication: For Gout

## 2018-04-22 NOTE — DISCHARGE NOTE ADULT - PATIENT PORTAL LINK FT
You can access the [facility] Patient Portal, offered by [facility], by registering with the following website: [email or web address]

## 2018-04-22 NOTE — DISCHARGE NOTE ADULT - HOSPITAL COURSE
88 year old female with PMH gout, HTN, sciatica, blind in R eye, presenting with generalized weakness x 2 days. In the ER, her vitals were as follows: T 98, HR 98, /82, rr 18, O2 sat 100% on RA.  She was given ceftriaxone 1g IV and 1L NS.  Hemoglobin on admission was 10.4. Iron studies were significant for iron deficiency anemia. She was started on ferrous sulfate 325mg TID. Orthostatics were initially positive. The 2nd day of hospital admission she was ambulating better without evidence of orthostatic hypotension. She was improved with instructions to discharge to home with home PT and to take iron supplementation. 88 year old female with PMH gout, HTN, sciatica, blind in R eye, presenting with generalized weakness x 2 days. In the ER, her vitals were as follows: T 98, HR 98, /82, rr 18, O2 sat 100% on RA.  She was given ceftriaxone 1g IV and 1L NS.  Hemoglobin on admission was 10.4. Iron studies were significant for iron deficiency anemia. She was started on ferrous sulfate 325mg TID. Orthostatics were initially positive. She was resuscitated with IV fluids. Concern for poor PO intake contributing to weakness. The 2nd day of hospital admission she was ambulating better without evidence of orthostatic hypotension. She was improved with instructions to discharge to home with home PT and to take iron supplementation and follow-up with primary care doctor. As patient has extensive cardiac work up in March, no further cardiac work up performed.

## 2018-04-22 NOTE — DISCHARGE NOTE ADULT - PROVIDER TOKENS
FREE:[LAST:[[place]],FIRST:[Janna],PHONE:[(   )    -],FAX:[(   )    -],ADDRESS:[[facility]: Duc Wing MD  No reviews · Internist  [address]  [phone]]]

## 2018-04-23 VITALS
TEMPERATURE: 98 F | HEART RATE: 80 BPM | SYSTOLIC BLOOD PRESSURE: 122 MMHG | DIASTOLIC BLOOD PRESSURE: 75 MMHG | OXYGEN SATURATION: 99 % | RESPIRATION RATE: 16 BRPM

## 2018-04-23 LAB
ANION GAP SERPL CALC-SCNC: 11 MMOL/L — SIGNIFICANT CHANGE UP (ref 5–17)
BUN SERPL-MCNC: 11 MG/DL — SIGNIFICANT CHANGE UP (ref 7–23)
CALCIUM SERPL-MCNC: 8.9 MG/DL — SIGNIFICANT CHANGE UP (ref 8.4–10.5)
CHLORIDE SERPL-SCNC: 100 MMOL/L — SIGNIFICANT CHANGE UP (ref 96–108)
CO2 SERPL-SCNC: 23 MMOL/L — SIGNIFICANT CHANGE UP (ref 22–31)
CREAT SERPL-MCNC: 0.86 MG/DL — SIGNIFICANT CHANGE UP (ref 0.5–1.3)
GLUCOSE SERPL-MCNC: 115 MG/DL — HIGH (ref 70–99)
HCT VFR BLD CALC: 29.5 % — LOW (ref 34.5–45)
HGB BLD-MCNC: 9 G/DL — LOW (ref 11.5–15.5)
MCHC RBC-ENTMCNC: 22.7 PG — LOW (ref 27–34)
MCHC RBC-ENTMCNC: 30.5 G/DL — LOW (ref 32–36)
MCV RBC AUTO: 74.5 FL — LOW (ref 80–100)
PLATELET # BLD AUTO: 468 K/UL — HIGH (ref 150–400)
POTASSIUM SERPL-MCNC: 3.9 MMOL/L — SIGNIFICANT CHANGE UP (ref 3.5–5.3)
POTASSIUM SERPL-SCNC: 3.9 MMOL/L — SIGNIFICANT CHANGE UP (ref 3.5–5.3)
RBC # BLD: 3.96 M/UL — SIGNIFICANT CHANGE UP (ref 3.8–5.2)
RBC # FLD: 18.8 % — HIGH (ref 10.3–16.9)
SODIUM SERPL-SCNC: 134 MMOL/L — LOW (ref 135–145)
WBC # BLD: 8.3 K/UL — SIGNIFICANT CHANGE UP (ref 3.8–10.5)
WBC # FLD AUTO: 8.3 K/UL — SIGNIFICANT CHANGE UP (ref 3.8–10.5)

## 2018-04-23 PROCEDURE — 99285 EMERGENCY DEPT VISIT HI MDM: CPT | Mod: 25

## 2018-04-23 PROCEDURE — 87633 RESP VIRUS 12-25 TARGETS: CPT

## 2018-04-23 PROCEDURE — 87581 M.PNEUMON DNA AMP PROBE: CPT

## 2018-04-23 PROCEDURE — 84484 ASSAY OF TROPONIN QUANT: CPT

## 2018-04-23 PROCEDURE — 83690 ASSAY OF LIPASE: CPT

## 2018-04-23 PROCEDURE — 82728 ASSAY OF FERRITIN: CPT

## 2018-04-23 PROCEDURE — 82553 CREATINE MB FRACTION: CPT

## 2018-04-23 PROCEDURE — 87798 DETECT AGENT NOS DNA AMP: CPT

## 2018-04-23 PROCEDURE — 85027 COMPLETE CBC AUTOMATED: CPT

## 2018-04-23 PROCEDURE — 93005 ELECTROCARDIOGRAM TRACING: CPT

## 2018-04-23 PROCEDURE — 81001 URINALYSIS AUTO W/SCOPE: CPT

## 2018-04-23 PROCEDURE — 83880 ASSAY OF NATRIURETIC PEPTIDE: CPT

## 2018-04-23 PROCEDURE — 85379 FIBRIN DEGRADATION QUANT: CPT

## 2018-04-23 PROCEDURE — 85025 COMPLETE CBC W/AUTO DIFF WBC: CPT

## 2018-04-23 PROCEDURE — 83735 ASSAY OF MAGNESIUM: CPT

## 2018-04-23 PROCEDURE — 87486 CHLMYD PNEUM DNA AMP PROBE: CPT

## 2018-04-23 PROCEDURE — 87040 BLOOD CULTURE FOR BACTERIA: CPT

## 2018-04-23 PROCEDURE — 97162 PT EVAL MOD COMPLEX 30 MIN: CPT

## 2018-04-23 PROCEDURE — 87086 URINE CULTURE/COLONY COUNT: CPT

## 2018-04-23 PROCEDURE — 80048 BASIC METABOLIC PNL TOTAL CA: CPT

## 2018-04-23 PROCEDURE — 36415 COLL VENOUS BLD VENIPUNCTURE: CPT

## 2018-04-23 PROCEDURE — 80053 COMPREHEN METABOLIC PANEL: CPT

## 2018-04-23 PROCEDURE — 71045 X-RAY EXAM CHEST 1 VIEW: CPT

## 2018-04-23 PROCEDURE — 83605 ASSAY OF LACTIC ACID: CPT

## 2018-04-23 PROCEDURE — 97116 GAIT TRAINING THERAPY: CPT

## 2018-04-23 PROCEDURE — 83550 IRON BINDING TEST: CPT

## 2018-04-23 PROCEDURE — 82550 ASSAY OF CK (CPK): CPT

## 2018-04-23 PROCEDURE — 99239 HOSP IP/OBS DSCHRG MGMT >30: CPT

## 2018-04-23 RX ADMIN — Medication 325 MILLIGRAM(S): at 06:08

## 2018-04-25 DIAGNOSIS — I95.1 ORTHOSTATIC HYPOTENSION: ICD-10-CM

## 2018-04-25 DIAGNOSIS — E87.1 HYPO-OSMOLALITY AND HYPONATREMIA: ICD-10-CM

## 2018-04-25 DIAGNOSIS — D50.9 IRON DEFICIENCY ANEMIA, UNSPECIFIED: ICD-10-CM

## 2018-04-25 DIAGNOSIS — E86.0 DEHYDRATION: ICD-10-CM

## 2018-04-25 DIAGNOSIS — I10 ESSENTIAL (PRIMARY) HYPERTENSION: ICD-10-CM

## 2018-04-25 DIAGNOSIS — M10.9 GOUT, UNSPECIFIED: ICD-10-CM

## 2018-04-25 DIAGNOSIS — E86.1 HYPOVOLEMIA: ICD-10-CM

## 2018-04-25 DIAGNOSIS — M54.30 SCIATICA, UNSPECIFIED SIDE: ICD-10-CM

## 2018-04-26 LAB
CULTURE RESULTS: SIGNIFICANT CHANGE UP
CULTURE RESULTS: SIGNIFICANT CHANGE UP
SPECIMEN SOURCE: SIGNIFICANT CHANGE UP
SPECIMEN SOURCE: SIGNIFICANT CHANGE UP

## 2019-01-09 ENCOUNTER — EMERGENCY (EMERGENCY)
Facility: HOSPITAL | Age: 84
LOS: 1 days | Discharge: HOME | End: 2019-01-09
Attending: EMERGENCY MEDICINE | Admitting: EMERGENCY MEDICINE
Payer: MEDICARE

## 2019-01-09 VITALS
HEART RATE: 93 BPM | WEIGHT: 184.97 LBS | SYSTOLIC BLOOD PRESSURE: 165 MMHG | DIASTOLIC BLOOD PRESSURE: 95 MMHG | TEMPERATURE: 98 F | RESPIRATION RATE: 16 BRPM | OXYGEN SATURATION: 98 %

## 2019-01-09 VITALS
SYSTOLIC BLOOD PRESSURE: 158 MMHG | OXYGEN SATURATION: 96 % | TEMPERATURE: 98 F | HEART RATE: 84 BPM | DIASTOLIC BLOOD PRESSURE: 91 MMHG | RESPIRATION RATE: 16 BRPM

## 2019-01-09 DIAGNOSIS — N39.0 URINARY TRACT INFECTION, SITE NOT SPECIFIED: ICD-10-CM

## 2019-01-09 DIAGNOSIS — Z79.899 OTHER LONG TERM (CURRENT) DRUG THERAPY: ICD-10-CM

## 2019-01-09 DIAGNOSIS — R53.1 WEAKNESS: ICD-10-CM

## 2019-01-09 DIAGNOSIS — I10 ESSENTIAL (PRIMARY) HYPERTENSION: ICD-10-CM

## 2019-01-09 DIAGNOSIS — S82.892A OTHER FRACTURE OF LEFT LOWER LEG, INITIAL ENCOUNTER FOR CLOSED FRACTURE: Chronic | ICD-10-CM

## 2019-01-09 PROBLEM — H26.9 UNSPECIFIED CATARACT: Chronic | Status: ACTIVE | Noted: 2018-04-21

## 2019-01-09 PROBLEM — H54.40 BLINDNESS, ONE EYE, UNSPECIFIED EYE: Chronic | Status: ACTIVE | Noted: 2018-04-21

## 2019-01-09 LAB
ANION GAP SERPL CALC-SCNC: 13 MMOL/L — SIGNIFICANT CHANGE UP (ref 5–17)
BASOPHILS NFR BLD AUTO: 0.2 % — SIGNIFICANT CHANGE UP (ref 0–2)
BUN SERPL-MCNC: 17 MG/DL — SIGNIFICANT CHANGE UP (ref 7–23)
CALCIUM SERPL-MCNC: 9.5 MG/DL — SIGNIFICANT CHANGE UP (ref 8.4–10.5)
CHLORIDE SERPL-SCNC: 102 MMOL/L — SIGNIFICANT CHANGE UP (ref 96–108)
CK MB CFR SERPL CALC: 1.5 NG/ML — SIGNIFICANT CHANGE UP (ref 0–6.7)
CK SERPL-CCNC: 77 U/L — SIGNIFICANT CHANGE UP (ref 25–170)
CO2 SERPL-SCNC: 24 MMOL/L — SIGNIFICANT CHANGE UP (ref 22–31)
CREAT SERPL-MCNC: 1.03 MG/DL — SIGNIFICANT CHANGE UP (ref 0.5–1.3)
EOSINOPHIL NFR BLD AUTO: 1.1 % — SIGNIFICANT CHANGE UP (ref 0–6)
GLUCOSE SERPL-MCNC: 138 MG/DL — HIGH (ref 70–99)
HCT VFR BLD CALC: 42.9 % — SIGNIFICANT CHANGE UP (ref 34.5–45)
HGB BLD-MCNC: 14.5 G/DL — SIGNIFICANT CHANGE UP (ref 11.5–15.5)
LYMPHOCYTES # BLD AUTO: 26.9 % — SIGNIFICANT CHANGE UP (ref 13–44)
MAGNESIUM SERPL-MCNC: 1.7 MG/DL — SIGNIFICANT CHANGE UP (ref 1.6–2.6)
MCHC RBC-ENTMCNC: 30.3 PG — SIGNIFICANT CHANGE UP (ref 27–34)
MCHC RBC-ENTMCNC: 33.8 G/DL — SIGNIFICANT CHANGE UP (ref 32–36)
MCV RBC AUTO: 89.6 FL — SIGNIFICANT CHANGE UP (ref 80–100)
MONOCYTES NFR BLD AUTO: 12 % — SIGNIFICANT CHANGE UP (ref 2–14)
NEUTROPHILS NFR BLD AUTO: 59.8 % — SIGNIFICANT CHANGE UP (ref 43–77)
PLATELET # BLD AUTO: 418 K/UL — HIGH (ref 150–400)
POTASSIUM SERPL-MCNC: 4.2 MMOL/L — SIGNIFICANT CHANGE UP (ref 3.5–5.3)
POTASSIUM SERPL-SCNC: 4.2 MMOL/L — SIGNIFICANT CHANGE UP (ref 3.5–5.3)
RBC # BLD: 4.79 M/UL — SIGNIFICANT CHANGE UP (ref 3.8–5.2)
RBC # FLD: 13.7 % — SIGNIFICANT CHANGE UP (ref 10.3–16.9)
SODIUM SERPL-SCNC: 139 MMOL/L — SIGNIFICANT CHANGE UP (ref 135–145)
TROPONIN T SERPL-MCNC: <0.01 NG/ML — SIGNIFICANT CHANGE UP (ref 0–0.01)
WBC # BLD: 9.7 K/UL — SIGNIFICANT CHANGE UP (ref 3.8–10.5)
WBC # FLD AUTO: 9.7 K/UL — SIGNIFICANT CHANGE UP (ref 3.8–10.5)

## 2019-01-09 PROCEDURE — 81001 URINALYSIS AUTO W/SCOPE: CPT

## 2019-01-09 PROCEDURE — 93005 ELECTROCARDIOGRAM TRACING: CPT

## 2019-01-09 PROCEDURE — 99285 EMERGENCY DEPT VISIT HI MDM: CPT | Mod: 25

## 2019-01-09 PROCEDURE — 93010 ELECTROCARDIOGRAM REPORT: CPT

## 2019-01-09 PROCEDURE — 87086 URINE CULTURE/COLONY COUNT: CPT

## 2019-01-09 PROCEDURE — 82553 CREATINE MB FRACTION: CPT

## 2019-01-09 PROCEDURE — 71045 X-RAY EXAM CHEST 1 VIEW: CPT

## 2019-01-09 PROCEDURE — 71045 X-RAY EXAM CHEST 1 VIEW: CPT | Mod: 26

## 2019-01-09 PROCEDURE — 84484 ASSAY OF TROPONIN QUANT: CPT

## 2019-01-09 PROCEDURE — 36415 COLL VENOUS BLD VENIPUNCTURE: CPT

## 2019-01-09 PROCEDURE — 82550 ASSAY OF CK (CPK): CPT

## 2019-01-09 PROCEDURE — 99283 EMERGENCY DEPT VISIT LOW MDM: CPT | Mod: 25

## 2019-01-09 PROCEDURE — 80048 BASIC METABOLIC PNL TOTAL CA: CPT

## 2019-01-09 PROCEDURE — 85025 COMPLETE CBC W/AUTO DIFF WBC: CPT

## 2019-01-09 PROCEDURE — 83735 ASSAY OF MAGNESIUM: CPT

## 2019-01-09 RX ORDER — SODIUM CHLORIDE 9 MG/ML
500 INJECTION INTRAMUSCULAR; INTRAVENOUS; SUBCUTANEOUS ONCE
Qty: 0 | Refills: 0 | Status: COMPLETED | OUTPATIENT
Start: 2019-01-09 | End: 2019-01-09

## 2019-01-09 RX ADMIN — SODIUM CHLORIDE 1000 MILLILITER(S): 9 INJECTION INTRAMUSCULAR; INTRAVENOUS; SUBCUTANEOUS at 01:52

## 2019-01-09 NOTE — ED PROVIDER NOTE - OBJECTIVE STATEMENT
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 Patient began drinking cranberry juice to alleviate her symptoms but they persisted. Odor improved by frequency and burning remained. Saw her PCP yesterday. UA was drawn and she was diagnosed with UTI and sent home on Levaquin, which she took 2 doses of so far. This morning, patient was progressively weak, SOB. Took her BP and it was SBP 70s. She called EMS and was unable to get to the door because of the weakness. Currently, admits to urinary frequency and dysuria, a/w weakness. Denies fevers, chills, CP, SOB, abdominal pain, N/V/D. Denies cigarette use, RDU, ETOH use. Of note, was recently started on her Metoprolol 50mg daily by her PCP (Her antihypertensives were stopped on last admission as she presented with weakness and was orthostatic +, with history of NATALIA). Uses cane and walker. Lives alone, HHA 5 hours 5 days a week.       generalized weakness x 2 days. In the ER, her vitals were as follows: T 98, HR 98, /82, rr 18, O2 sat 100% on RA.  She was given ceftriaxone 1g IV and 1L NS.  Hemoglobin on admission was 10.4. Iron studies were significant for iron deficiency anemia. She was started on ferrous sulfate 325mg TID. Orthostatics were initially positive. She was resuscitated with IV fluids. Concern for poor PO intake contributing to weakness. The 2nd day of hospital admission she was ambulating better without evidence of orthostatic hypotension. She was improved with instructions to discharge to home with home PT and to take iron supplementation and follow-up with primary care doctor. As patient has extensive cardiac work up in March, no further cardiac work up performed. Also takes oxycodone for her chronic lower back pain. Completed her iron supplement course x 3 months. 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 Patient began drinking cranberry juice to alleviate her symptoms but they persisted. Odor improved by frequency and burning remained. Saw her PCP yesterday. UA was drawn and she was diagnosed with UTI and sent home on Levaquin, which she took 2 doses of so far. This morning, patient was progressively weak, SOB. Took her BP and it was SBP 70s. She called EMS and was unable to get to the door because of the weakness. Currently, admits to urinary frequency and dysuria, a/w weakness. Denies fevers, chills, CP, SOB, abdominal pain, N/V/D. Denies cigarette use, RDU, ETOH use. Of note, was recently started on her Metoprolol 50mg daily by her PCP (Her antihypertensives were stopped on last admission as she presented with weakness and was orthostatic +, with history of NATALIA). Uses cane and walker. Lives alone, HHA 5 hours 5 days a week.

## 2019-01-09 NOTE — ED ADULT NURSE NOTE - NSIMPLEMENTINTERV_GEN_ALL_ED
Implemented All Universal Safety Interventions:  [place] to call system. Call bell, personal items and telephone within reach. Instruct patient to call for assistance. Room bathroom lighting operational. Non-slip footwear when patient is off stretcher. Physically safe environment: no spills, clutter or unnecessary equipment. Stretcher in lowest position, wheels locked, appropriate side rails in place.

## 2019-01-09 NOTE — ED PROVIDER NOTE - MEDICAL DECISION MAKING DETAILS
Patient with concerns for UTI with dysuria, increased urinary freq and weakness in Elderly. Labs reviewed. UA unremarkable. Negative cardiac enzymes. CXR negative. However already took 2 doses of Levaquin. Has 10 more pills at home. Hemodynamically stable. Defer antibiotics at this time. Received fluids. Patient with concerns for UTI with dysuria, increased urinary freq and weakness in Elderly. Labs reviewed. Normal labs. UA unremarkable. Negative cardiac enzymes. CXR negative. However already took 2 doses of Levaquin. Has 10 more pills at home. Hemodynamically stable. Received fluids in ED. Recommend continuing Levaquin as prescribed and following up with her PCP in 1 week for a BP monitoring kit and adjustment of BP meds as needed. Patient stable and ready for discharge to home. Has HHA 5 hrs daily x 5 days. Deemed safe discharge.

## 2019-01-09 NOTE — ED PROVIDER NOTE - ATTENDING CONTRIBUTION TO CARE
Attending Statement: I have personally performed a face to face diagnostic evaluation on this patient. I have reviewed the resident note and agree with the history, exam and plan of care, except as noted.     Attending Contribution to Care:  Patient p/w w recent dx of UTI, started levaquin who p/w dysuria, increased urinary freq and weakness- generalized. No focal neuro deficits. VSS, Labs with no emergent findings. UA unremarkable. Negative cardiac enzymes. CXR negative. However already took 2 doses of Levaquin. Has 10 more pills at home. Hemodynamically stable. Received fluids in ED. Recommend continuing Levaquin as prescribed and following up with her PCP in 1 week for a BP monitoring kit and adjustment of BP meds as needed. Patient stable and ready for discharge to home. Has HHA 5 hrs daily x 5 days. Pt is stable for DC with outpt follow up or to return to ER for concerning or worsening sx.

## 2019-06-20 NOTE — ED PROVIDER NOTE - GENITOURINARY [+], MLM
Suprapubic pain, Dysuria, Urinary frequency
on phonation/Class II - visualization of the soft palate, fauces, and uvula

## 2019-08-08 ENCOUNTER — EMERGENCY (EMERGENCY)
Facility: HOSPITAL | Age: 84
LOS: 1 days | Discharge: HOME | End: 2019-08-08
Attending: EMERGENCY MEDICINE | Admitting: EMERGENCY MEDICINE
Payer: MEDICARE

## 2019-08-08 VITALS
TEMPERATURE: 98 F | SYSTOLIC BLOOD PRESSURE: 161 MMHG | OXYGEN SATURATION: 98 % | HEART RATE: 77 BPM | RESPIRATION RATE: 17 BRPM | DIASTOLIC BLOOD PRESSURE: 93 MMHG

## 2019-08-08 VITALS
OXYGEN SATURATION: 100 % | DIASTOLIC BLOOD PRESSURE: 84 MMHG | SYSTOLIC BLOOD PRESSURE: 161 MMHG | RESPIRATION RATE: 18 BRPM | TEMPERATURE: 98 F | HEART RATE: 79 BPM

## 2019-08-08 DIAGNOSIS — R06.00 DYSPNEA, UNSPECIFIED: ICD-10-CM

## 2019-08-08 DIAGNOSIS — I10 ESSENTIAL (PRIMARY) HYPERTENSION: ICD-10-CM

## 2019-08-08 DIAGNOSIS — R06.02 SHORTNESS OF BREATH: ICD-10-CM

## 2019-08-08 DIAGNOSIS — Z79.899 OTHER LONG TERM (CURRENT) DRUG THERAPY: ICD-10-CM

## 2019-08-08 DIAGNOSIS — S82.892A OTHER FRACTURE OF LEFT LOWER LEG, INITIAL ENCOUNTER FOR CLOSED FRACTURE: Chronic | ICD-10-CM

## 2019-08-08 LAB
ALBUMIN SERPL ELPH-MCNC: 3.6 G/DL — SIGNIFICANT CHANGE UP (ref 3.3–5)
ALP SERPL-CCNC: 110 U/L — SIGNIFICANT CHANGE UP (ref 40–120)
ALT FLD-CCNC: 10 U/L — SIGNIFICANT CHANGE UP (ref 10–45)
ANION GAP SERPL CALC-SCNC: 12 MMOL/L — SIGNIFICANT CHANGE UP (ref 5–17)
APPEARANCE UR: CLEAR — SIGNIFICANT CHANGE UP
APTT BLD: 27.8 SEC — SIGNIFICANT CHANGE UP (ref 27.5–36.3)
AST SERPL-CCNC: 24 U/L — SIGNIFICANT CHANGE UP (ref 10–40)
BASOPHILS # BLD AUTO: 0.03 K/UL — SIGNIFICANT CHANGE UP (ref 0–0.2)
BASOPHILS NFR BLD AUTO: 0.4 % — SIGNIFICANT CHANGE UP (ref 0–2)
BILIRUB SERPL-MCNC: 0.3 MG/DL — SIGNIFICANT CHANGE UP (ref 0.2–1.2)
BILIRUB UR-MCNC: NEGATIVE — SIGNIFICANT CHANGE UP
BUN SERPL-MCNC: 14 MG/DL — SIGNIFICANT CHANGE UP (ref 7–23)
CALCIUM SERPL-MCNC: 9 MG/DL — SIGNIFICANT CHANGE UP (ref 8.4–10.5)
CHLORIDE SERPL-SCNC: 103 MMOL/L — SIGNIFICANT CHANGE UP (ref 96–108)
CK MB CFR SERPL CALC: 1.5 NG/ML — SIGNIFICANT CHANGE UP (ref 0–6.7)
CK SERPL-CCNC: 81 U/L — SIGNIFICANT CHANGE UP (ref 25–170)
CO2 SERPL-SCNC: 23 MMOL/L — SIGNIFICANT CHANGE UP (ref 22–31)
COLOR SPEC: YELLOW — SIGNIFICANT CHANGE UP
CREAT SERPL-MCNC: 1.01 MG/DL — SIGNIFICANT CHANGE UP (ref 0.5–1.3)
DIFF PNL FLD: NEGATIVE — SIGNIFICANT CHANGE UP
EOSINOPHIL # BLD AUTO: 0.1 K/UL — SIGNIFICANT CHANGE UP (ref 0–0.5)
EOSINOPHIL NFR BLD AUTO: 1.2 % — SIGNIFICANT CHANGE UP (ref 0–6)
GLUCOSE SERPL-MCNC: 96 MG/DL — SIGNIFICANT CHANGE UP (ref 70–99)
GLUCOSE UR QL: NEGATIVE — SIGNIFICANT CHANGE UP
HCT VFR BLD CALC: 39.6 % — SIGNIFICANT CHANGE UP (ref 34.5–45)
HGB BLD-MCNC: 12.2 G/DL — SIGNIFICANT CHANGE UP (ref 11.5–15.5)
IMM GRANULOCYTES NFR BLD AUTO: 0.6 % — SIGNIFICANT CHANGE UP (ref 0–1.5)
INR BLD: 1.11 — SIGNIFICANT CHANGE UP (ref 0.88–1.16)
KETONES UR-MCNC: NEGATIVE — SIGNIFICANT CHANGE UP
LEUKOCYTE ESTERASE UR-ACNC: NEGATIVE — SIGNIFICANT CHANGE UP
LYMPHOCYTES # BLD AUTO: 3.08 K/UL — SIGNIFICANT CHANGE UP (ref 1–3.3)
LYMPHOCYTES # BLD AUTO: 36.6 % — SIGNIFICANT CHANGE UP (ref 13–44)
MCHC RBC-ENTMCNC: 26.8 PG — LOW (ref 27–34)
MCHC RBC-ENTMCNC: 30.8 GM/DL — LOW (ref 32–36)
MCV RBC AUTO: 87 FL — SIGNIFICANT CHANGE UP (ref 80–100)
MONOCYTES # BLD AUTO: 1.07 K/UL — HIGH (ref 0–0.9)
MONOCYTES NFR BLD AUTO: 12.7 % — SIGNIFICANT CHANGE UP (ref 2–14)
NEUTROPHILS # BLD AUTO: 4.09 K/UL — SIGNIFICANT CHANGE UP (ref 1.8–7.4)
NEUTROPHILS NFR BLD AUTO: 48.5 % — SIGNIFICANT CHANGE UP (ref 43–77)
NITRITE UR-MCNC: NEGATIVE — SIGNIFICANT CHANGE UP
NRBC # BLD: 0 /100 WBCS — SIGNIFICANT CHANGE UP (ref 0–0)
NT-PROBNP SERPL-SCNC: 32 PG/ML — SIGNIFICANT CHANGE UP (ref 0–300)
PH UR: 6.5 — SIGNIFICANT CHANGE UP (ref 5–8)
PLATELET # BLD AUTO: 428 K/UL — HIGH (ref 150–400)
POTASSIUM SERPL-MCNC: 4.5 MMOL/L — SIGNIFICANT CHANGE UP (ref 3.5–5.3)
POTASSIUM SERPL-SCNC: 4.5 MMOL/L — SIGNIFICANT CHANGE UP (ref 3.5–5.3)
PROT SERPL-MCNC: 8.4 G/DL — HIGH (ref 6–8.3)
PROT UR-MCNC: NEGATIVE MG/DL — SIGNIFICANT CHANGE UP
PROTHROM AB SERPL-ACNC: 12.6 SEC — SIGNIFICANT CHANGE UP (ref 10–12.9)
RBC # BLD: 4.55 M/UL — SIGNIFICANT CHANGE UP (ref 3.8–5.2)
RBC # FLD: 15.4 % — HIGH (ref 10.3–14.5)
SODIUM SERPL-SCNC: 138 MMOL/L — SIGNIFICANT CHANGE UP (ref 135–145)
SP GR SPEC: 1.01 — SIGNIFICANT CHANGE UP (ref 1–1.03)
TROPONIN T SERPL-MCNC: <0.01 NG/ML — SIGNIFICANT CHANGE UP (ref 0–0.01)
UROBILINOGEN FLD QL: 0.2 E.U./DL — SIGNIFICANT CHANGE UP
WBC # BLD: 8.42 K/UL — SIGNIFICANT CHANGE UP (ref 3.8–10.5)
WBC # FLD AUTO: 8.42 K/UL — SIGNIFICANT CHANGE UP (ref 3.8–10.5)

## 2019-08-08 PROCEDURE — 71045 X-RAY EXAM CHEST 1 VIEW: CPT | Mod: 26

## 2019-08-08 PROCEDURE — 85025 COMPLETE CBC W/AUTO DIFF WBC: CPT

## 2019-08-08 PROCEDURE — 99283 EMERGENCY DEPT VISIT LOW MDM: CPT | Mod: 25

## 2019-08-08 PROCEDURE — 81003 URINALYSIS AUTO W/O SCOPE: CPT

## 2019-08-08 PROCEDURE — 93005 ELECTROCARDIOGRAM TRACING: CPT

## 2019-08-08 PROCEDURE — 93010 ELECTROCARDIOGRAM REPORT: CPT

## 2019-08-08 PROCEDURE — 99285 EMERGENCY DEPT VISIT HI MDM: CPT

## 2019-08-08 PROCEDURE — 85610 PROTHROMBIN TIME: CPT

## 2019-08-08 PROCEDURE — 71045 X-RAY EXAM CHEST 1 VIEW: CPT

## 2019-08-08 PROCEDURE — 80053 COMPREHEN METABOLIC PANEL: CPT

## 2019-08-08 PROCEDURE — 84484 ASSAY OF TROPONIN QUANT: CPT

## 2019-08-08 PROCEDURE — 82550 ASSAY OF CK (CPK): CPT

## 2019-08-08 PROCEDURE — 83880 ASSAY OF NATRIURETIC PEPTIDE: CPT

## 2019-08-08 PROCEDURE — 36415 COLL VENOUS BLD VENIPUNCTURE: CPT

## 2019-08-08 PROCEDURE — 82553 CREATINE MB FRACTION: CPT

## 2019-08-08 PROCEDURE — 85730 THROMBOPLASTIN TIME PARTIAL: CPT

## 2019-08-08 NOTE — ED PROVIDER NOTE - ATTENDING CONTRIBUTION TO CARE
89 y/o F with PMH of HTN presents stating had an episode of feeling shortness of breath this morning.  Pt reports walking in her apartment "I felt a little short of breath, but I wasn't out of breath."  Symptoms resolved shortly, pt was encouraged by HHA to come to ED for evaluation after she told her about the episode.  Pt stating now feeling better, asymptomatic in ED.  Denies CP, cough, fever, leg swelling, all other ROS negative. Pt AAO, NAD, RRR, CTA b/l, abd: soft and NT. Pt with no CP, asymptomatic in ED, vss. Labs wnl, ekg nonischemic, CXR negative.  Pt reports neg stress test in the past year, unlikely cardiac, wants d/c.  Will dc to f/u with pmd, to return to ED immediately if sxs return.

## 2019-08-08 NOTE — ED PROVIDER NOTE - NSFOLLOWUPINSTRUCTIONS_ED_ALL_ED_FT
Shortness of Breath    WHAT YOU NEED TO KNOW:    Shortness of breath is a feeling that you cannot get enough air when you breathe in. You may have this feeling only during activity, or all the time. Your symptoms can range from mild to severe. Shortness of breath may be a sign of a serious health condition that needs immediate care.    DISCHARGE INSTRUCTIONS:    Return to the emergency department if:     Your signs and symptoms are the same or worse within 24 hours of treatment.       The skin over your ribs or on your neck sinks in when you breathe.       You feel confused or dizzy.    Contact your healthcare provider if:     You have new or worsening symptoms.      You have questions or concerns about your condition or care.    Medicines:     Medicines may be used to treat the cause of your symptoms. You may need medicine to treat a bacterial infection or reduce anxiety. Other medicines may be used to open your airway, reduce swelling, or remove extra fluid. If you have a heart condition, you may need medicine to help your heart beat more strongly or regularly.      Take your medicine as directed. Contact your healthcare provider if you think your medicine is not helping or if you have side effects. Tell him or her if you are allergic to any medicine. Keep a list of the medicines, vitamins, and herbs you take. Include the amounts, and when and why you take them. Bring the list or the pill bottles to follow-up visits. Carry your medicine list with you in case of an emergency.    Manage shortness of breath:     Create an action plan. You and your healthcare provider can work together to create a plan for how to handle shortness of breath. The plan can include daily activities, treatment changes, and what to do if you have severe breathing problems.      Lean forward on your elbows when you sit. This helps your lungs expand and may make it easier to breathe.      Use pursed-lip breathing any time you feel short of breath. Breathe in through your nose and then slowly breathe out through your mouth with your lips slightly puckered. It should take you twice as long to breathe out as it did to breathe in.Breathe in Breathe out           Do not smoke. Nicotine and other chemicals in cigarettes and cigars can cause lung damage and make shortness of breath worse. Ask your healthcare provider for information if you currently smoke and need help to quit. E-cigarettes or smokeless tobacco still contain nicotine. Talk to your healthcare provider before you use these products.      Reach or maintain a healthy weight. Your healthcare provider can help you create a safe weight loss plan if you are overweight.      Exercise as directed. Exercise can help your lungs work more easily. Exercise can also help you lose weight if needed. Try to get at least 30 minutes of exercise most days of the week.    Follow up with your healthcare provider or specialist as directed: Write down your questions so you remember to ask them during your visits.

## 2019-08-08 NOTE — ED ADULT TRIAGE NOTE - CHIEF COMPLAINT QUOTE
c/o dyspnea on exertion and high blood pressure x 3 days.  Hx HTN, gout.  Speaking clearly and coherently in full sentences.

## 2019-08-08 NOTE — ED ADULT NURSE NOTE - NSIMPLEMENTINTERV_GEN_ALL_ED
Implemented All Fall with Harm Risk Interventions:  [place] to call system. Call bell, personal items and telephone within reach. Instruct patient to call for assistance. Room bathroom lighting operational. Non-slip footwear when patient is off stretcher. Physically safe environment: no spills, clutter or unnecessary equipment. Stretcher in lowest position, wheels locked, appropriate side rails in place. Provide visual cue, wrist band, yellow gown, etc. Monitor gait and stability. Monitor for mental status changes and reorient to person, place, and time. Review medications for side effects contributing to fall risk. Reinforce activity limits and safety measures with patient and family. Provide visual clues: red socks.

## 2019-08-08 NOTE — ED PROVIDER NOTE - CLINICAL SUMMARY MEDICAL DECISION MAKING FREE TEXT BOX
89 y/o f hx HTN presents after brief episode of sob this morning while walking in her home; pt with no CP, asymptomatic in ED, vss.  Labs wnl, ekg nonischemic, CXR negative.  Pt reports neg stress test in the past year, unlikely cardiac, wants d/c.  Will dc to f/u with pmd, to return to ED immediately if sx return.

## 2019-08-08 NOTE — ED PROVIDER NOTE - OBJECTIVE STATEMENT
89 y/o f hx HTN presents stating had an episode of feeling shortness of breath this morning.  Pt reports walking in her apartment "I felt a little short of breath, but I wasn't out of breath."  Symptoms resolved shortly, pt was encouraged by HHA to come to ED for evaluation after she told her about the episode.  Pt stating now feeling better, asymptomatic in ED.  Denies CP, cough, fever, leg swelling, all other ROS negative.

## 2019-08-08 NOTE — ED ADULT NURSE NOTE - OBJECTIVE STATEMENT
Pt presents to ED c/o SOB on exertion. Pt reports starting this morning SOB walking around her house (bed to bathroom, bed to living room), denies CP, dizziness/lightheadedness while walking, fall at home. Reports she sleeps with two pillows at night, unchanged from baseline, no SOB while lying down, no leg swelling per pt, no edema noted. No f/c at home, endorses cough ~2 weeks ago that pt reports has since passed. Pt reports she told her HHA that she felt SOB so HHA called the visiting nurse, pressure found to be high at home so pt was sent to ED for eval. Pt presents in NAD speaking full sentences via EMS stretcher through triage. EKG preformed on arrival to bed 20.

## 2020-01-06 NOTE — ED PROVIDER NOTE - CROS ED RESP ALL NEG
Name and  verified      Chief Complaint   Patient presents with    Pre-op Exam     Dental Procedure on 2020         Health Maintenance reviewed-discussed with patient. 1. Have you been to the ER, urgent care clinic since your last visit? Hospitalized since your last visit? no    2. Have you seen or consulted any other health care providers outside of the [address] [place] [address] since your last visit? Include any pap smears or colon screening.   no negative...

## 2020-05-02 VITALS
OXYGEN SATURATION: 100 % | WEIGHT: 194.01 LBS | SYSTOLIC BLOOD PRESSURE: 161 MMHG | RESPIRATION RATE: 20 BRPM | HEIGHT: 62 IN | HEART RATE: 89 BPM | DIASTOLIC BLOOD PRESSURE: 86 MMHG | TEMPERATURE: 98 F

## 2020-05-02 PROCEDURE — 71045 X-RAY EXAM CHEST 1 VIEW: CPT | Mod: 26

## 2020-05-02 NOTE — ED ADULT NURSE NOTE - NSIMPLEMENTINTERV_GEN_ALL_ED
Implemented All Fall with Harm Risk Interventions:  [place] to call system. Call bell, personal items and telephone within reach. Instruct patient to call for assistance. Room bathroom lighting operational. Non-slip footwear when patient is off stretcher. Physically safe environment: no spills, clutter or unnecessary equipment. Stretcher in lowest position, wheels locked, appropriate side rails in place. Provide visual cue, wrist band, yellow gown, etc. Monitor gait and stability. Monitor for mental status changes and reorient to person, place, and time. Review medications for side effects contributing to fall risk. Reinforce activity limits and safety measures with patient and family. Provide visual clues: red socks.

## 2020-05-02 NOTE — ED PROVIDER NOTE - DIAGNOSTIC INTERPRETATION
Chest x-ray interpreted by ER Physician Dr. Harris  Findings: heart size uln, no infiltrates, no pleural effusion, no PTX, no appreciated fracture.

## 2020-05-02 NOTE — ED PROVIDER NOTE - OBJECTIVE STATEMENT
89F pmh htn p/w L sided cp for past 3 days, as well as new SCHRADER walking to her front door and ble edema for past few days. No f/c, no cough, no n/v, no abd pain. No cp at this time. CP was L sided rad to L shoulder, nothing made it better/worse. Gout 89F pmh htn p/w sob for past 3 days, w/ new SCHRADER walking to her front door and ble edema for past few days, assoc w/ L sided cp tonight, dull, aching. No f/c, no cough, no n/v, no abd pain. No cp at this time. CP was L sided rad to L shoulder, nothing made it better/worse. 89F pmh htn p/w sob for past 3 days, w/ new SCHRADER walking to her front door and ble edema for past few days, assoc w/ L sided cp tonight approx 8p, dull, aching. No f/c, no cough, no n/v, no abd pain. No cp at this time. CP was L sided rad to L shoulder, nothing made it better/worse.

## 2020-05-02 NOTE — ED ADULT NURSE NOTE - OBJECTIVE STATEMENT
Pt co SOB worsening on exertion and talking, and intermittent episodes of sharp chest pain x3days. Pt denies palpitations, dizziness, nausea/vomiting/diarrhea, fevers/chills, body aches, headache, cough, sore throat, known sick contacts. Endorses hx of htn, reports compliance with routine amlodipine and metoprolol. Edema noted to bilateral ankles, nonpitting. Hx of gout noted in previous documentation. No acute distress noted upon assessment, VSS, EKG performed.

## 2020-05-02 NOTE — ED PROVIDER NOTE - CLINICAL SUMMARY MEDICAL DECISION MAKING FREE TEXT BOX
89F pmh htn p/w L sided cp for past 3 days, as well as new SCHRADER walking to her front door and ble edema for past few days. No f/c, no cough, no n/v, no abd pain. No cp at this time. CP was L sided rad to L shoulder, nothing made it better/worse. Exam noted for mild ble edema. Concern acs, chf, less likely covid or pna or viral given no cough, no fever. 89F pmh htn p/w sob for past 3 days, w/ new SCHRADER walking to her front door and ble edema for past few days, assoc w/ L sided cp tonight, dull, aching. No f/c, no cough, no n/v, no abd pain. No cp at this time. CP was L sided rad to L shoulder, nothing made it better/worse. . Exam noted for mild ble edema. Concern acs, chf, less likely covid or pna or viral given no cough, no fever. 89F pmh htn p/w sob for past 3 days, w/ new SCHRADER walking to her front door and ble edema for past few days, assoc w/ L sided cp tonight, dull, aching. No f/c, no cough, no n/v, no abd pain. No cp at this time. CP was L sided rad to L shoulder, nothing made it better/worse. . Exam noted for mild ble edema. Concern acs, chf, PE, less likely pna. Doubt COVID as no no cough, no fever, sx not consistent w/ COVID. CTA w/o e/o covid, cxr neg, PCR neg

## 2020-05-02 NOTE — ED ADULT NURSE NOTE - CAS EDN DISCHARGE ASSESSMENT
[facility]     Emergency Department     Faculty Attestation    I performed a history and physical examination of the patient and discussed management with the resident. I reviewed the residents note and agree with the documented findings including all diagnostic interpretations and plan of care. Any areas of disagreement are noted on the chart. I was personally present for the key portions of any procedures. I have documented in the chart those procedures where I was not present during the key portions. I have reviewed the emergency nurses triage note. I agree with the chief complaint, past medical history, past surgical history, allergies, medications, social and family history as documented unless otherwise noted below. Documentation of the HPI, Physical Exam and Medical Decision Making performed by scribharjeet is based on my personal performance of the HPI, PE and MDM. For Physician Assistant/ Nurse Practitioner cases/documentation I have personally evaluated this patient and have completed at least one if not all key elements of the E/M (history, physical exam, and MDM). Additional findings are as noted. Primary Care Physician: Sergio Contreras    History: This is a 39 y.o. male who presents to the Emergency Department with complaint of altered mental status. Patient sent from [facility] for not acting himself. Patient reports his typical tremors, he says he feels like he can't recall certain things but cannot elaborate further. Denies any chest pain shortness of breath abdominal pain and vomiting. Has history of seizures, does not believe he has had seizure recently. Physical:     height is 5' 8\" (1.727 m) and weight is 150 lb (68 kg). His oral temperature is 97.2 °F (36.2 °C). His blood pressure is 92/63 and his pulse is 76. His respiration is 16 and oxygen saturation is 93%.     39 y.o. male no acute distress, pupils are 4 mm equally reactive Alert and oriented to person, place and time

## 2020-05-02 NOTE — ED PROVIDER NOTE - PHYSICAL EXAMINATION
VITAL SIGNS: I have reviewed nursing notes and confirm.  CONSTITUTIONAL: Well-developed; well-nourished; in no acute distress.  SKIN: Skin is warm and dry, no acute rash.  HEAD: Normocephalic; atraumatic.  EYES:  EOM intact; conjunctiva and sclera clear.  ENT: No nasal discharge; airway clear.  NECK: Supple; Voluntary FROM  CARD: No rubs appreciated, Regular rate and rhythm.  RESP: No wheezes, no rales. No respiratory distress  ABD: Soft; non-distended; non-tender; no rebound or guarding  EXT: Normal ROM. No cyanosis. 1+ ble   NEURO: Alert, oriented. Grossly unremarkable.  PSYCH: Cooperative, appropriate.

## 2020-05-02 NOTE — ED ADULT NURSE NOTE - OTHER COMPLAINTS
CC of non-radiating L sided chest pain x 3 days on-and-off,  accompanied with SOB worst during exertion

## 2020-05-03 ENCOUNTER — INPATIENT (INPATIENT)
Facility: HOSPITAL | Age: 85
LOS: 0 days | Discharge: HOME | DRG: 313 | End: 2020-05-04
Attending: INTERNAL MEDICINE | Admitting: INTERNAL MEDICINE
Payer: MEDICARE

## 2020-05-03 DIAGNOSIS — R07.9 CHEST PAIN, UNSPECIFIED: ICD-10-CM

## 2020-05-03 DIAGNOSIS — I10 ESSENTIAL (PRIMARY) HYPERTENSION: ICD-10-CM

## 2020-05-03 DIAGNOSIS — Z98.49 CATARACT EXTRACTION STATUS, UNSPECIFIED EYE: Chronic | ICD-10-CM

## 2020-05-03 DIAGNOSIS — D64.9 ANEMIA, UNSPECIFIED: ICD-10-CM

## 2020-05-03 DIAGNOSIS — R06.02 SHORTNESS OF BREATH: ICD-10-CM

## 2020-05-03 DIAGNOSIS — S82.892A OTHER FRACTURE OF LEFT LOWER LEG, INITIAL ENCOUNTER FOR CLOSED FRACTURE: Chronic | ICD-10-CM

## 2020-05-03 LAB
A1C WITH ESTIMATED AVERAGE GLUCOSE RESULT: 5.9 % — HIGH (ref 4–5.6)
ALBUMIN SERPL ELPH-MCNC: 3.7 G/DL — SIGNIFICANT CHANGE UP (ref 3.3–5)
ALP SERPL-CCNC: 114 U/L — SIGNIFICANT CHANGE UP (ref 40–120)
ALT FLD-CCNC: 9 U/L — LOW (ref 10–45)
ANION GAP SERPL CALC-SCNC: 13 MMOL/L — SIGNIFICANT CHANGE UP (ref 5–17)
ANION GAP SERPL CALC-SCNC: 13 MMOL/L — SIGNIFICANT CHANGE UP (ref 5–17)
APTT BLD: 25.5 SEC — LOW (ref 27.5–36.3)
AST SERPL-CCNC: 20 U/L — SIGNIFICANT CHANGE UP (ref 10–40)
BASOPHILS # BLD AUTO: 0.05 K/UL — SIGNIFICANT CHANGE UP (ref 0–0.2)
BASOPHILS NFR BLD AUTO: 0.5 % — SIGNIFICANT CHANGE UP (ref 0–2)
BILIRUB SERPL-MCNC: 0.2 MG/DL — SIGNIFICANT CHANGE UP (ref 0.2–1.2)
BUN SERPL-MCNC: 11 MG/DL — SIGNIFICANT CHANGE UP (ref 7–23)
BUN SERPL-MCNC: 12 MG/DL — SIGNIFICANT CHANGE UP (ref 7–23)
CALCIUM SERPL-MCNC: 8.9 MG/DL — SIGNIFICANT CHANGE UP (ref 8.4–10.5)
CALCIUM SERPL-MCNC: 9.2 MG/DL — SIGNIFICANT CHANGE UP (ref 8.4–10.5)
CHLORIDE SERPL-SCNC: 102 MMOL/L — SIGNIFICANT CHANGE UP (ref 96–108)
CHLORIDE SERPL-SCNC: 103 MMOL/L — SIGNIFICANT CHANGE UP (ref 96–108)
CHOLEST SERPL-MCNC: 149 MG/DL — SIGNIFICANT CHANGE UP (ref 10–199)
CK MB CFR SERPL CALC: 1.1 NG/ML — SIGNIFICANT CHANGE UP (ref 0–6.7)
CK SERPL-CCNC: 50 U/L — SIGNIFICANT CHANGE UP (ref 25–170)
CO2 SERPL-SCNC: 20 MMOL/L — LOW (ref 22–31)
CO2 SERPL-SCNC: 21 MMOL/L — LOW (ref 22–31)
CREAT SERPL-MCNC: 1 MG/DL — SIGNIFICANT CHANGE UP (ref 0.5–1.3)
CREAT SERPL-MCNC: 1.01 MG/DL — SIGNIFICANT CHANGE UP (ref 0.5–1.3)
EOSINOPHIL # BLD AUTO: 0.1 K/UL — SIGNIFICANT CHANGE UP (ref 0–0.5)
EOSINOPHIL NFR BLD AUTO: 1 % — SIGNIFICANT CHANGE UP (ref 0–6)
ESTIMATED AVERAGE GLUCOSE: 123 MG/DL — HIGH (ref 68–114)
FERRITIN SERPL-MCNC: 10 NG/ML — LOW (ref 15–150)
FOLATE SERPL-MCNC: 8.9 NG/ML — SIGNIFICANT CHANGE UP
GLUCOSE SERPL-MCNC: 121 MG/DL — HIGH (ref 70–99)
GLUCOSE SERPL-MCNC: 153 MG/DL — HIGH (ref 70–99)
HCT VFR BLD CALC: 32.9 % — LOW (ref 34.5–45)
HCT VFR BLD CALC: 33.8 % — LOW (ref 34.5–45)
HDLC SERPL-MCNC: 49 MG/DL — LOW
HGB BLD-MCNC: 10 G/DL — LOW (ref 11.5–15.5)
HGB BLD-MCNC: 9.5 G/DL — LOW (ref 11.5–15.5)
IMM GRANULOCYTES NFR BLD AUTO: 0.4 % — SIGNIFICANT CHANGE UP (ref 0–1.5)
INR BLD: 1.1 — SIGNIFICANT CHANGE UP (ref 0.88–1.16)
IRON SATN MFR SERPL: 31 UG/DL — SIGNIFICANT CHANGE UP (ref 30–160)
IRON SATN MFR SERPL: 8 % — LOW (ref 14–50)
LIPID PNL WITH DIRECT LDL SERPL: 80 MG/DL — SIGNIFICANT CHANGE UP
LYMPHOCYTES # BLD AUTO: 2.85 K/UL — SIGNIFICANT CHANGE UP (ref 1–3.3)
LYMPHOCYTES # BLD AUTO: 28 % — SIGNIFICANT CHANGE UP (ref 13–44)
MAGNESIUM SERPL-MCNC: 1.8 MG/DL — SIGNIFICANT CHANGE UP (ref 1.6–2.6)
MCHC RBC-ENTMCNC: 22.2 PG — LOW (ref 27–34)
MCHC RBC-ENTMCNC: 22.5 PG — LOW (ref 27–34)
MCHC RBC-ENTMCNC: 28.9 GM/DL — LOW (ref 32–36)
MCHC RBC-ENTMCNC: 29.6 GM/DL — LOW (ref 32–36)
MCV RBC AUTO: 76.1 FL — LOW (ref 80–100)
MCV RBC AUTO: 77 FL — LOW (ref 80–100)
MONOCYTES # BLD AUTO: 1.34 K/UL — HIGH (ref 0–0.9)
MONOCYTES NFR BLD AUTO: 13.2 % — SIGNIFICANT CHANGE UP (ref 2–14)
NEUTROPHILS # BLD AUTO: 5.8 K/UL — SIGNIFICANT CHANGE UP (ref 1.8–7.4)
NEUTROPHILS NFR BLD AUTO: 56.9 % — SIGNIFICANT CHANGE UP (ref 43–77)
NRBC # BLD: 0 /100 WBCS — SIGNIFICANT CHANGE UP (ref 0–0)
NRBC # BLD: 0 /100 WBCS — SIGNIFICANT CHANGE UP (ref 0–0)
PLATELET # BLD AUTO: 425 K/UL — HIGH (ref 150–400)
PLATELET # BLD AUTO: 582 K/UL — HIGH (ref 150–400)
POTASSIUM SERPL-MCNC: 4.2 MMOL/L — SIGNIFICANT CHANGE UP (ref 3.5–5.3)
POTASSIUM SERPL-MCNC: 4.4 MMOL/L — SIGNIFICANT CHANGE UP (ref 3.5–5.3)
POTASSIUM SERPL-SCNC: 4.2 MMOL/L — SIGNIFICANT CHANGE UP (ref 3.5–5.3)
POTASSIUM SERPL-SCNC: 4.4 MMOL/L — SIGNIFICANT CHANGE UP (ref 3.5–5.3)
PROT SERPL-MCNC: 8.1 G/DL — SIGNIFICANT CHANGE UP (ref 6–8.3)
PROTHROM AB SERPL-ACNC: 12.6 SEC — SIGNIFICANT CHANGE UP (ref 10–12.9)
RBC # BLD: 4.27 M/UL — SIGNIFICANT CHANGE UP (ref 3.8–5.2)
RBC # BLD: 4.44 M/UL — SIGNIFICANT CHANGE UP (ref 3.8–5.2)
RBC # FLD: 18.6 % — HIGH (ref 10.3–14.5)
RBC # FLD: 18.8 % — HIGH (ref 10.3–14.5)
SARS-COV-2 RNA SPEC QL NAA+PROBE: SIGNIFICANT CHANGE UP
SARS-COV-2 RNA SPEC QL NAA+PROBE: SIGNIFICANT CHANGE UP
SODIUM SERPL-SCNC: 136 MMOL/L — SIGNIFICANT CHANGE UP (ref 135–145)
SODIUM SERPL-SCNC: 136 MMOL/L — SIGNIFICANT CHANGE UP (ref 135–145)
TIBC SERPL-MCNC: 396 UG/DL — SIGNIFICANT CHANGE UP (ref 220–430)
TOTAL CHOLESTEROL/HDL RATIO MEASUREMENT: 3 RATIO — LOW (ref 3.3–7.1)
TRIGL SERPL-MCNC: 98 MG/DL — SIGNIFICANT CHANGE UP (ref 10–149)
TROPONIN T SERPL-MCNC: <0.01 NG/ML — SIGNIFICANT CHANGE UP (ref 0–0.01)
TSH SERPL-MCNC: 1.6 UIU/ML — SIGNIFICANT CHANGE UP (ref 0.35–4.94)
UIBC SERPL-MCNC: 365 UG/DL — SIGNIFICANT CHANGE UP (ref 110–370)
VIT B12 SERPL-MCNC: 511 PG/ML — SIGNIFICANT CHANGE UP (ref 232–1245)
WBC # BLD: 10.18 K/UL — SIGNIFICANT CHANGE UP (ref 3.8–10.5)
WBC # BLD: 9.9 K/UL — SIGNIFICANT CHANGE UP (ref 3.8–10.5)
WBC # FLD AUTO: 10.18 K/UL — SIGNIFICANT CHANGE UP (ref 3.8–10.5)
WBC # FLD AUTO: 9.9 K/UL — SIGNIFICANT CHANGE UP (ref 3.8–10.5)

## 2020-05-03 PROCEDURE — 71275 CT ANGIOGRAPHY CHEST: CPT | Mod: 26

## 2020-05-03 PROCEDURE — 99285 EMERGENCY DEPT VISIT HI MDM: CPT | Mod: 25

## 2020-05-03 PROCEDURE — 93970 EXTREMITY STUDY: CPT | Mod: 26

## 2020-05-03 PROCEDURE — 99223 1ST HOSP IP/OBS HIGH 75: CPT

## 2020-05-03 PROCEDURE — 71045 X-RAY EXAM CHEST 1 VIEW: CPT | Mod: 26

## 2020-05-03 PROCEDURE — 93010 ELECTROCARDIOGRAM REPORT: CPT

## 2020-05-03 RX ORDER — ASPIRIN/CALCIUM CARB/MAGNESIUM 324 MG
81 TABLET ORAL DAILY
Refills: 0 | Status: DISCONTINUED | OUTPATIENT
Start: 2020-05-04 | End: 2020-05-04

## 2020-05-03 RX ORDER — ALLOPURINOL 300 MG
1 TABLET ORAL
Qty: 0 | Refills: 0 | DISCHARGE

## 2020-05-03 RX ORDER — METOPROLOL TARTRATE 50 MG
0 TABLET ORAL
Qty: 0 | Refills: 0 | DISCHARGE

## 2020-05-03 RX ORDER — METOPROLOL TARTRATE 50 MG
50 TABLET ORAL DAILY
Refills: 0 | Status: DISCONTINUED | OUTPATIENT
Start: 2020-05-03 | End: 2020-05-03

## 2020-05-03 RX ORDER — MAGNESIUM OXIDE 400 MG ORAL TABLET 241.3 MG
800 TABLET ORAL ONCE
Refills: 0 | Status: COMPLETED | OUTPATIENT
Start: 2020-05-03 | End: 2020-05-03

## 2020-05-03 RX ORDER — ENOXAPARIN SODIUM 100 MG/ML
40 INJECTION SUBCUTANEOUS EVERY 24 HOURS
Refills: 0 | Status: DISCONTINUED | OUTPATIENT
Start: 2020-05-03 | End: 2020-05-04

## 2020-05-03 RX ORDER — ALLOPURINOL 300 MG
0 TABLET ORAL
Qty: 0 | Refills: 0 | DISCHARGE

## 2020-05-03 RX ORDER — ASPIRIN/CALCIUM CARB/MAGNESIUM 324 MG
325 TABLET ORAL ONCE
Refills: 0 | Status: COMPLETED | OUTPATIENT
Start: 2020-05-03 | End: 2020-05-03

## 2020-05-03 RX ORDER — AMLODIPINE BESYLATE 2.5 MG/1
0 TABLET ORAL
Qty: 0 | Refills: 0 | DISCHARGE

## 2020-05-03 RX ORDER — METOPROLOL TARTRATE 50 MG
1 TABLET ORAL
Qty: 0 | Refills: 0 | DISCHARGE

## 2020-05-03 RX ORDER — ALLOPURINOL 300 MG
100 TABLET ORAL DAILY
Refills: 0 | Status: DISCONTINUED | OUTPATIENT
Start: 2020-05-03 | End: 2020-05-04

## 2020-05-03 RX ORDER — METOPROLOL TARTRATE 50 MG
50 TABLET ORAL ONCE
Refills: 0 | Status: DISCONTINUED | OUTPATIENT
Start: 2020-05-03 | End: 2020-05-03

## 2020-05-03 RX ORDER — AMLODIPINE BESYLATE 2.5 MG/1
5 TABLET ORAL DAILY
Refills: 0 | Status: DISCONTINUED | OUTPATIENT
Start: 2020-05-03 | End: 2020-05-04

## 2020-05-03 RX ORDER — METOPROLOL TARTRATE 50 MG
50 TABLET ORAL ONCE
Refills: 0 | Status: COMPLETED | OUTPATIENT
Start: 2020-05-03 | End: 2020-05-03

## 2020-05-03 RX ADMIN — MAGNESIUM OXIDE 400 MG ORAL TABLET 800 MILLIGRAM(S): 241.3 TABLET ORAL at 07:54

## 2020-05-03 RX ADMIN — Medication 100 MILLIGRAM(S): at 10:40

## 2020-05-03 RX ADMIN — ENOXAPARIN SODIUM 40 MILLIGRAM(S): 100 INJECTION SUBCUTANEOUS at 05:59

## 2020-05-03 RX ADMIN — AMLODIPINE BESYLATE 5 MILLIGRAM(S): 2.5 TABLET ORAL at 05:59

## 2020-05-03 RX ADMIN — Medication 50 MILLIGRAM(S): at 15:11

## 2020-05-03 RX ADMIN — Medication 325 MILLIGRAM(S): at 00:51

## 2020-05-03 RX ADMIN — Medication 50 MILLIGRAM(S): at 06:00

## 2020-05-03 NOTE — PROGRESS NOTE ADULT - SUBJECTIVE AND OBJECTIVE BOX
Interventional Cardiology PA Adult Progress Note    Subjective Assessment:        ROS otherwise negative except as stated in HPI and subjective.	  MEDICATIONS:  amLODIPine   Tablet 5 milliGRAM(s) Oral daily  metoprolol succinate ER 50 milliGRAM(s) Oral once  allopurinol 100 milliGRAM(s) Oral daily  enoxaparin Injectable 40 milliGRAM(s) SubCutaneous every 24 hours    PHYSICAL EXAM:  TELEMETRY:  T(C): 36.8 (05-03-20 @ 09:00), Max: 36.9 (05-02-20 @ 23:18)  HR: 69 (05-03-20 @ 12:39) (69 - 98)  BP: 110/55 (05-03-20 @ 12:39) (110/55 - 177/87)  RR: 17 (05-03-20 @ 12:39) (17 - 20)  SpO2: 100% (05-03-20 @ 12:39) (99% - 100%)  Wt(kg): --  I&O's Summary    02 May 2020 07:01  -  03 May 2020 07:00  --------------------------------------------------------  IN: 50 mL / OUT: 200 mL / NET: -150 mL    03 May 2020 07:01  -  03 May 2020 13:53  --------------------------------------------------------  IN: 360 mL / OUT: 450 mL / NET: -90 mL      Height (cm): 157.48 (05-03 @ 03:11)  Weight (kg): 88 (05-03 @ 03:11)  BMI (kg/m2): 35.5 (05-03 @ 03:11)  BSA (m2): 1.89 (05-03 @ 03:11)  Mackey:  Central/PICC/Mid Line:                                         Appearance: Normal	  HEENT:   Normal oral mucosa, PERRL, EOMI	  Neck: Supple, + JVD/ - JVD; Carotid Bruit   Cardiovascular: Normal S1 S2, No JVD, No murmurs,   Respiratory: Lungs clear to auscultation/Decreased Breath Sounds/No Rales, Rhonchi, Wheezing	  Gastrointestinal:  Soft, Non-tender, + BS	  Skin: No rashes, No ecchymoses, No cyanosis  Extremities: Normal range of motion, No clubbing, cyanosis or edema  Vascular: Peripheral pulses palpable 2+ bilaterally  Neurologic: Non-focal  Psychiatry: A & O x 3, Mood & affect appropriate    LABS:                        9.5    10.18 )-----------( 425      ( 03 May 2020 06:22 )             32.9     05-03    136  |  103  |  11  ----------------------------<  121<H>  4.2   |  20<L>  |  1.00    Ca    8.9      03 May 2020 06:22  Mg     1.8     05-03    TPro  8.1  /  Alb  3.7  /  TBili  0.2  /  DBili  x   /  AST  20  /  ALT  9<L>  /  AlkPhos  114  05-02    proBNP: Serum Pro-Brain Natriuretic Peptide: 72 pg/mL (05-02 @ 23:33)    Lipid Profile:   HgA1c:   TSH: Thyroid Stimulating Hormone, Serum: 1.598 uIU/mL (05-03 @ 06:22)    PT/INR - ( 02 May 2020 23:32 )   PT: 12.6 sec;   INR: 1.10          PTT - ( 02 May 2020 23:32 )  PTT:25.5 sec    ASSESSMENT/PLAN: 	        DVT ppx:  Dispo:

## 2020-05-03 NOTE — H&P ADULT - PROBLEM SELECTOR PLAN 4
-SBP 160s  -continue home BP meds    FULL CODE  DVT PPx: Lovenox  PT consulted  Dispo: f/u trops, Echo

## 2020-05-03 NOTE — PROGRESS NOTE ADULT - PROBLEM SELECTOR PLAN 4
-SBP 110s-160s  -continue home BP meds    FULL CODE  DVT PPx: Lovenox  PT consulted  Dispo: Echo and CTA coronaries on Monday 5/4 -SBP 110s-160s  -continue home BP meds    FULL CODE  DVT PPx: Lovenox  PT consulted  Dispo: Echo and CTA coronaries on Monday 5/4, NPO after midnight. Consider discharge after tests if results are not concerning.

## 2020-05-03 NOTE — H&P ADULT - PROBLEM SELECTOR PLAN 3
-SBP 160s  -continue home BP meds    FULL CODE  DVT PPx: Lovenox  PT consulted  Dispo: f/u trops, Echo -Hgb 10.0 on admission  -f/u iron studies, UA and stool occult

## 2020-05-03 NOTE — H&P ADULT - PROBLEM SELECTOR PLAN 1
-Currently CP free, In ED patient given ASA 325mg POx1  -EKG: SR @ 96bpm, 1st degree AV block, no significant ST-T wave changes (unchanged from prior EKG), Trops x1 negative, f/u trops @ 6AM and 10AM  -NST 2/2018: normal myocardial perfusion  -CTA chest PE protocol negative for PE, LE duplex negative for DVT  -f/u repeat EKG, continue to monitor on telemetry  -Echo ordered -Currently CP free, In ED patient given ASA 325mg POx1  -EKG: SR @ 96bpm, 1st degree AV block, no significant ST-T wave changes (unchanged from prior EKG), Trops x1 negative, f/u trops @ 6AM and 10AM  -NST 2/2018: normal myocardial perfusion  -CTA chest PE protocol negative for PE, LE duplex negative for DVT  -f/u repeat EKG, continue to monitor on telemetry  -Echo ordered  -f/u A1C, LDL, and TSH   -continue aspirin 81mg QD

## 2020-05-03 NOTE — H&P ADULT - NSICDXPASTMEDICALHX_GEN_ALL_CORE_FT
PAST MEDICAL HISTORY:  Blindness of right eye     Cataract L eye, s/p surgery    Gout     HTN (hypertension)

## 2020-05-03 NOTE — PROGRESS NOTE ADULT - PROBLEM SELECTOR PLAN 1
-Currently CP free, In ED patient given ASA 325mg POx1  -EKG: SR @ 96bpm, 1st degree AV block, no significant ST-T wave changes (unchanged from prior EKG), Trop x3  -NST 2/2018: normal myocardial perfusion  -CTA chest PE protocol negative for PE, LE duplex negative for DVT  -f/u repeat EKG, continue to monitor on telemetry  -Echo ordered   - TSH normal. Hemoglobin A1C 5.9. LDL 80.   -continue aspirin 81mg QD  -NPO after midnight for CTA coronaries in AM  - Pt is on Toprol XL 50mg daily at home. Pt received dose on 5/3 AM but was held so pt can get Lopressor on 5/4 prior to CTA coronaries. -Currently CP free, In ED patient given ASA 325mg POx1  -EKG: SR @ 96bpm, 1st degree AV block, no significant ST-T wave changes (unchanged from prior EKG), Trop x3  -NST 2/2018: normal myocardial perfusion  -CTA chest PE protocol negative for PE, LE duplex negative for DVT  -f/u repeat EKG, continue to monitor on telemetry  -Echo ordered   - TSH normal. Hemoglobin A1C 5.9. LDL 80.   -continue aspirin 81mg QD  -NPO after midnight for CTA coronaries in AM  - Pt is on Toprol XL 50mg daily at home. Pt received dose on 5/3 AM but was held so pt can get Lopressor on 5/4 prior to CTA coronaries. Pt also received an additional Toprol XL 50mg x 1 on 5/3 at 2pm as Dr Vigil wanted to increase it as pt is hypertensive with SBPs to the 140s-160s -Currently CP free, In ED patient given ASA 325mg POx1  -EKG: SR @ 96bpm, 1st degree AV block, no significant ST-T wave changes (unchanged from prior EKG), Trop x3  -NST 2/2018: normal myocardial perfusion  -CTA chest PE protocol negative for PE, LE duplex negative for DVT  -f/u repeat EKG, continue to monitor on telemetry  -Echo ordered   - TSH normal. Hemoglobin A1C 5.9. LDL 80.   -continue aspirin 81mg QD  -NPO after midnight for CTA coronaries in AM  - Pt is on Toprol XL 50mg daily at home.  Pt also received an additional Toprol XL 50mg x 1 on 5/3 at 2pm as Dr Vigil wanted to increase it as pt is hypertensive with SBPs to the 140s-160 and heart rate optimization before CTA.  Cont to monitor heart rate - Lopressor on 5/4 prior to CTA coronaries.

## 2020-05-03 NOTE — PROGRESS NOTE ADULT - ASSESSMENT
Patient is a 89 y/o female with PMHx of HTN who presented to ED 05/03/2020 c/o SOB x 3 days, and new CP. Now admitted to cardiology service for r/o ACS, and further cardiac workup. Pt trop neg x 3. Will be NPO after midnight for CTA coronaries in AM. Echo ordered. PT recommended home with no PT needs.

## 2020-05-03 NOTE — H&P ADULT - ASSESSMENT
Patient is a 89 y/o female with PMHx of HTN who presented to ED 05/03/2020 c/o SOB x 3 days, and new CP. Now admitted to cardiology service for r/o ACS, and further cardiac workup

## 2020-05-03 NOTE — PROGRESS NOTE ADULT - PROBLEM SELECTOR PLAN 3
-Hgb 10.0 on admission  - UA negative   - Total iron 31. Ferritin 10. . Percent Iron saturation 8.   - f/u stool occult -Hgb 10.0 on admission  - UA negative   - Total iron 31. Ferritin 10. . Percent Iron saturation 8. Consistent with iron deficiency anemia, consider adding PO iron tablet  - f/u stool occult

## 2020-05-03 NOTE — H&P ADULT - HISTORY OF PRESENT ILLNESS
Patient is a 89 y/o female AOx3 with PMHx of HTN who presented to ED 05/03/2020 c/o SOB x 3 days. Patient reports new SCHRADER for 3 days, upon walking to the front door of house associated with bilateral edema. She states that it initially started with just SCHRADER, but has now progressed to SOB with left sided chest pain which started at 8pm last night. She describes the pain as dull and achy with pain radiating to left shoulder, with no relief of pain until arriving at ED. She attempted to rest, and massage area of pain with no relief. Patient endorses having an appointment with her outpatient cardiologist some time this week, but was worried about pain and decided to come to ED for evaluation. She denies palpitations, syncope, PND/orthopnea, fever, chills, N/V/D, cough, abdominal pain or any other acute complaints. Of note patient with NST 02/2018 which revealed normal myocardial perfusion, with no regional wall abnormalities. In ED VS /86, HR 89, Temp 98.4F, RR 20, O2 100% RA. EKG: SR @ 96bpm, with 1st degree AV block, no significant ST-T wave changes, unchanged from prior EKG. CXR wet read no infiltrates, Labs trops x1 negative, Hgb 10.0 (baseline 10-12), BNP 72, covid negative x1, CTA chest PE protocol negative for PE, large type III hiatal hernia, LE duplex prelim read no DVT. In ED patient was give ASA 325mg POx1. Upon examination patient feeling well, denies any current CP.    Patient is now admitted to cardiology service to r/o ACS, and further cardiac workup Patient is a 89 y/o female AOx3 with PMHx of HTN who presented to ED 05/03/2020 c/o SOB x 3 days. Patient reports new SCHRADER for 3 days, upon walking to the front door of house associated with bilateral edema. She states that it initially started with just SCHRADER, but has now progressed to SOB with left sided chest pain which started at 8pm last night. She describes the pain as dull and achy with pain radiating to left shoulder, with no relief of pain until arriving at ED. She attempted to rest, and massage area of pain with no relief. Patient endorses having an appointment with her outpatient cardiologist some time this week, but was worried about pain and decided to come to ED for evaluation. She denies palpitations, syncope, PND/orthopnea, fever, chills, N/V/D, cough, abdominal pain, melena, BRBPR, hematemesis or any other acute complaints. Of note patient with NST 02/2018 which revealed normal myocardial perfusion, with no regional wall abnormalities. In ED VS /86, HR 89, Temp 98.4F, RR 20, O2 100% RA. EKG: SR @ 96bpm, with 1st degree AV block, no significant ST-T wave changes, unchanged from prior EKG. CXR wet read no infiltrates, Labs trops x1 negative, Hgb 10.0 (baseline 10-12), BNP 72, covid negative x1, CTA chest PE protocol negative for PE, large type III hiatal hernia, LE duplex prelim read no DVT. In ED patient was give ASA 325mg POx1. Upon examination patient feeling well, denies any current CP.    Patient is now admitted to cardiology service to r/o ACS, and further cardiac workup

## 2020-05-03 NOTE — PHYSICAL THERAPY INITIAL EVALUATION ADULT - PERTINENT HX OF CURRENT PROBLEM, REHAB EVAL
89F c/o SOB x 3 days. Patient reports new SCHRADER for 3 days, upon walking to the front door of house associated with bilateral edema. She states that it initially started with just SCHRADER, but has now progressed to SOB with left sided chest pain which started at 8pm last night.

## 2020-05-03 NOTE — PROGRESS NOTE ADULT - PROBLEM SELECTOR PLAN 2
-Patient with SOB upon walking to bathroom in ED, satting 100% on RA  -b/l LE edema 1+ pitting, no JVD, lungs CTAB  -BNP 72, CXR clear.  -f/u ECHO

## 2020-05-03 NOTE — H&P ADULT - NSHPLABSRESULTS_GEN_ALL_CORE
EKG: SR @ 96bpm, 1st degree AV block, no significant ST-T wave changes (unchanged from prior EKG)                          10.0   9.90  )-----------( 582      ( 02 May 2020 23:32 )             33.8       05-02    136  |  102  |  12  ----------------------------<  153<H>  4.4   |  21<L>  |  1.01    Ca    9.2      02 May 2020 23:33    TPro  8.1  /  Alb  3.7  /  TBili  0.2  /  DBili  x   /  AST  20  /  ALT  9<L>  /  AlkPhos  114  05-02      PT/INR - ( 02 May 2020 23:32 )   PT: 12.6 sec;   INR: 1.10          PTT - ( 02 May 2020 23:32 )  PTT:25.5 sec    CARDIAC MARKERS ( 02 May 2020 23:33 )  x     / <0.01 ng/mL / x     / x     / x

## 2020-05-03 NOTE — PROGRESS NOTE ADULT - ATTENDING COMMENTS
89 y/o female AOx3 with PMHx of HTN admitted 2020 c/o SCHRADER x 3 days and rest SOB a/w left sided CP x 3 hours.  NST 2018 which revealed normal myocardial perfusion, with no regional wall abnormalities. EKst degree AV block, no significant ST-T wave changes, unchanged from prior EKG. No clinical HF, BNP 72. In ED patient was given ASA 325mg POx1.   No chest pain on the floor. Trop negative x3. LDL 80. On Toprol XL 50 and Norvasc 5 mg for HTN.   Increased Toprol to 100 mg for better heart rate control for potential CTA coronaries tomorrow. Continue to monitor heart rate. Administer lopressor in am with a goal heart rate around 60s. Echo in am.

## 2020-05-03 NOTE — PROGRESS NOTE ADULT - SUBJECTIVE AND OBJECTIVE BOX
Interventional Cardiology PA Adult Progress Note    Subjective Assessment:        ROS otherwise negative except as stated in HPI and subjective.   MEDICATIONS:  amLODIPine   Tablet 5 milliGRAM(s) Oral daily  allopurinol 100 milliGRAM(s) Oral daily  enoxaparin Injectable 40 milliGRAM(s) SubCutaneous every 24 hours    PHYSICAL EXAM:  TELEMETRY: NSR with intermittent PVCs  T(C): 36.8 (05-03-20 @ 09:00), Max: 36.9 (05-02-20 @ 23:18)  HR: 69 (05-03-20 @ 12:39) (69 - 98)  BP: 110/55 (05-03-20 @ 12:39) (110/55 - 177/87)  RR: 17 (05-03-20 @ 12:39) (17 - 20)  SpO2: 100% (05-03-20 @ 12:39) (99% - 100%)  Wt(kg): --  I&O's Summary    02 May 2020 07:01  -  03 May 2020 07:00  --------------------------------------------------------  IN: 50 mL / OUT: 200 mL / NET: -150 mL    03 May 2020 07:01  -  03 May 2020 13:58  --------------------------------------------------------  IN: 360 mL / OUT: 450 mL / NET: -90 mL      Height (cm): 157.48 (05-03 @ 03:11)  Weight (kg): 88 (05-03 @ 03:11)  BMI (kg/m2): 35.5 (05-03 @ 03:11)  BSA (m2): 1.89 (05-03 @ 03:11)  Mackey:  Central/PICC/Mid Line:                                         Appearance: Normal	  HEENT:   Normal oral mucosa, PERRL, EOMI	  Neck: Supple  Cardiovascular: Normal S1 S2, No JVD, No murmurs,   Respiratory: Lungs clear to auscultation b/l, No Rales, Rhonchi, Wheezing	  Gastrointestinal:  Soft, Non-tender, + BS	  Skin: No rashes, No ecchymoses, No cyanosis  Extremities: Normal range of motion, No clubbing, cyanosis or edema  Vascular: Peripheral pulses palpable 2+ bilaterally  Neurologic: Non-focal  Psychiatry: A & O x 3, Mood & affect appropriate    LABS:                          9.5    10.18 )-----------( 425      ( 03 May 2020 06:22 )             32.9     05-03    136  |  103  |  11  ----------------------------<  121<H>  4.2   |  20<L>  |  1.00    Ca    8.9      03 May 2020 06:22  Mg     1.8     05-03    TPro  8.1  /  Alb  3.7  /  TBili  0.2  /  DBili  x   /  AST  20  /  ALT  9<L>  /  AlkPhos  114  05-02    proBNP: Serum Pro-Brain Natriuretic Peptide: 72 pg/mL (05-02 @ 23:33)  TSH: Thyroid Stimulating Hormone, Serum: 1.598 uIU/mL (05-03 @ 06:22)  PT/INR - ( 02 May 2020 23:32 )   PT: 12.6 sec;   INR: 1.10     PTT - ( 02 May 2020 23:32 )  PTT:25.5 sec    ASSESSMENT/PLAN: Interventional Cardiology PA Adult Progress Note    CC: Chest pain and shortness of breath  Subjective Assessment:    Currently asymptomatic.     ROS otherwise negative except as stated in HPI and subjective.   MEDICATIONS:  amLODIPine   Tablet 5 milliGRAM(s) Oral daily  allopurinol 100 milliGRAM(s) Oral daily  enoxaparin Injectable 40 milliGRAM(s) SubCutaneous every 24 hours    PHYSICAL EXAM:  TELEMETRY: NSR with intermittent PVCs  T(C): 36.8 (05-03-20 @ 09:00), Max: 36.9 (05-02-20 @ 23:18)  HR: 69 (05-03-20 @ 12:39) (69 - 98)  BP: 110/55 (05-03-20 @ 12:39) (110/55 - 177/87)  RR: 17 (05-03-20 @ 12:39) (17 - 20)  SpO2: 100% (05-03-20 @ 12:39) (99% - 100%)  Wt(kg): --  I&O's Summary    02 May 2020 07:01  -  03 May 2020 07:00  --------------------------------------------------------  IN: 50 mL / OUT: 200 mL / NET: -150 mL    03 May 2020 07:01  -  03 May 2020 13:58  --------------------------------------------------------  IN: 360 mL / OUT: 450 mL / NET: -90 mL      Height (cm): 157.48 (05-03 @ 03:11)  Weight (kg): 88 (05-03 @ 03:11)  BMI (kg/m2): 35.5 (05-03 @ 03:11)  BSA (m2): 1.89 (05-03 @ 03:11)  Mackey: none  Central/PICC/Mid Line:  none                                       Appearance: Normal	  HEENT:   Normal oral mucosa, PERRL, EOMI	  Neck: Supple  Cardiovascular: Normal S1 S2, No JVD, No murmurs,   Respiratory: Lungs clear to auscultation b/l, No Rales, Rhonchi, Wheezing	  Gastrointestinal:  Soft, Non-tender, + BS	  Skin: No rashes, No ecchymoses, No cyanosis  Extremities: Normal range of motion, No clubbing, cyanosis or edema  Vascular: Peripheral pulses palpable 2+ bilaterally  Neurologic: Non-focal  Psychiatry: A & O x 3, Mood & affect appropriate    LABS:                          9.5    10.18 )-----------( 425      ( 03 May 2020 06:22 )             32.9     05-03    136  |  103  |  11  ----------------------------<  121<H>  4.2   |  20<L>  |  1.00    Ca    8.9      03 May 2020 06:22  Mg     1.8     05-03    TPro  8.1  /  Alb  3.7  /  TBili  0.2  /  DBili  x   /  AST  20  /  ALT  9<L>  /  AlkPhos  114  05-02    proBNP: Serum Pro-Brain Natriuretic Peptide: 72 pg/mL (05-02 @ 23:33)  TSH: Thyroid Stimulating Hormone, Serum: 1.598 uIU/mL (05-03 @ 06:22)  PT/INR - ( 02 May 2020 23:32 )   PT: 12.6 sec;   INR: 1.10     PTT - ( 02 May 2020 23:32 )  PTT:25.5 sec    ASSESSMENT/PLAN:

## 2020-05-03 NOTE — H&P ADULT - PROBLEM SELECTOR PLAN 2
-Patient with SOB upon walking to bathroom in ED, satting 100% on RA  -b/l LE edema 1+ pitting, no JVD, lungs CTAB  -BNP 72, CXR wet read no infiltrates, clear, f/u official CXR  -f/u ECHO

## 2020-05-04 ENCOUNTER — TRANSCRIPTION ENCOUNTER (OUTPATIENT)
Age: 85
End: 2020-05-04

## 2020-05-04 VITALS
HEART RATE: 76 BPM | RESPIRATION RATE: 20 BRPM | OXYGEN SATURATION: 98 % | DIASTOLIC BLOOD PRESSURE: 63 MMHG | SYSTOLIC BLOOD PRESSURE: 129 MMHG

## 2020-05-04 LAB
ALBUMIN SERPL ELPH-MCNC: 3.4 G/DL — SIGNIFICANT CHANGE UP (ref 3.3–5)
ALP SERPL-CCNC: 102 U/L — SIGNIFICANT CHANGE UP (ref 40–120)
ALT FLD-CCNC: 8 U/L — LOW (ref 10–45)
AST SERPL-CCNC: 14 U/L — SIGNIFICANT CHANGE UP (ref 10–40)
BASOPHILS # BLD AUTO: 0.03 K/UL — SIGNIFICANT CHANGE UP (ref 0–0.2)
BASOPHILS NFR BLD AUTO: 0.4 % — SIGNIFICANT CHANGE UP (ref 0–2)
BILIRUB SERPL-MCNC: 0.3 MG/DL — SIGNIFICANT CHANGE UP (ref 0.2–1.2)
BUN SERPL-MCNC: 15 MG/DL — SIGNIFICANT CHANGE UP (ref 7–23)
CALCIUM SERPL-MCNC: 9 MG/DL — SIGNIFICANT CHANGE UP (ref 8.4–10.5)
CHLORIDE SERPL-SCNC: 107 MMOL/L — SIGNIFICANT CHANGE UP (ref 96–108)
CO2 SERPL-SCNC: 23 MMOL/L — SIGNIFICANT CHANGE UP (ref 22–31)
CREAT SERPL-MCNC: 0.96 MG/DL — SIGNIFICANT CHANGE UP (ref 0.5–1.3)
EOSINOPHIL # BLD AUTO: 0.15 K/UL — SIGNIFICANT CHANGE UP (ref 0–0.5)
EOSINOPHIL NFR BLD AUTO: 1.8 % — SIGNIFICANT CHANGE UP (ref 0–6)
GLUCOSE SERPL-MCNC: 125 MG/DL — HIGH (ref 70–99)
HCT VFR BLD CALC: 31.1 % — LOW (ref 34.5–45)
HGB BLD-MCNC: 9 G/DL — LOW (ref 11.5–15.5)
IMM GRANULOCYTES NFR BLD AUTO: 0.2 % — SIGNIFICANT CHANGE UP (ref 0–1.5)
LYMPHOCYTES # BLD AUTO: 2.71 K/UL — SIGNIFICANT CHANGE UP (ref 1–3.3)
LYMPHOCYTES # BLD AUTO: 32.2 % — SIGNIFICANT CHANGE UP (ref 13–44)
MAGNESIUM SERPL-MCNC: 2 MG/DL — SIGNIFICANT CHANGE UP (ref 1.6–2.6)
MCHC RBC-ENTMCNC: 22.2 PG — LOW (ref 27–34)
MCHC RBC-ENTMCNC: 28.9 GM/DL — LOW (ref 32–36)
MCV RBC AUTO: 76.6 FL — LOW (ref 80–100)
MONOCYTES # BLD AUTO: 1.1 K/UL — HIGH (ref 0–0.9)
MONOCYTES NFR BLD AUTO: 13.1 % — SIGNIFICANT CHANGE UP (ref 2–14)
NEUTROPHILS # BLD AUTO: 4.4 K/UL — SIGNIFICANT CHANGE UP (ref 1.8–7.4)
NEUTROPHILS NFR BLD AUTO: 52.3 % — SIGNIFICANT CHANGE UP (ref 43–77)
NRBC # BLD: 0 /100 WBCS — SIGNIFICANT CHANGE UP (ref 0–0)
PLATELET # BLD AUTO: 440 K/UL — HIGH (ref 150–400)
POTASSIUM SERPL-MCNC: 4.2 MMOL/L — SIGNIFICANT CHANGE UP (ref 3.5–5.3)
POTASSIUM SERPL-SCNC: 4.2 MMOL/L — SIGNIFICANT CHANGE UP (ref 3.5–5.3)
PROT SERPL-MCNC: 7.6 G/DL — SIGNIFICANT CHANGE UP (ref 6–8.3)
RBC # BLD: 4.06 M/UL — SIGNIFICANT CHANGE UP (ref 3.8–5.2)
RBC # FLD: 18.5 % — HIGH (ref 10.3–14.5)
SODIUM SERPL-SCNC: 142 MMOL/L — SIGNIFICANT CHANGE UP (ref 135–145)
WBC # BLD: 8.41 K/UL — SIGNIFICANT CHANGE UP (ref 3.8–10.5)
WBC # FLD AUTO: 8.41 K/UL — SIGNIFICANT CHANGE UP (ref 3.8–10.5)

## 2020-05-04 PROCEDURE — 85027 COMPLETE CBC AUTOMATED: CPT

## 2020-05-04 PROCEDURE — 80061 LIPID PANEL: CPT

## 2020-05-04 PROCEDURE — 75574 CT ANGIO HRT W/3D IMAGE: CPT

## 2020-05-04 PROCEDURE — 82728 ASSAY OF FERRITIN: CPT

## 2020-05-04 PROCEDURE — 83540 ASSAY OF IRON: CPT

## 2020-05-04 PROCEDURE — 82553 CREATINE MB FRACTION: CPT

## 2020-05-04 PROCEDURE — 80048 BASIC METABOLIC PNL TOTAL CA: CPT

## 2020-05-04 PROCEDURE — 97163 PT EVAL HIGH COMPLEX 45 MIN: CPT

## 2020-05-04 PROCEDURE — 85610 PROTHROMBIN TIME: CPT

## 2020-05-04 PROCEDURE — 71275 CT ANGIOGRAPHY CHEST: CPT

## 2020-05-04 PROCEDURE — 84484 ASSAY OF TROPONIN QUANT: CPT

## 2020-05-04 PROCEDURE — 83880 ASSAY OF NATRIURETIC PEPTIDE: CPT

## 2020-05-04 PROCEDURE — 83550 IRON BINDING TEST: CPT

## 2020-05-04 PROCEDURE — 83036 HEMOGLOBIN GLYCOSYLATED A1C: CPT

## 2020-05-04 PROCEDURE — 85730 THROMBOPLASTIN TIME PARTIAL: CPT

## 2020-05-04 PROCEDURE — 93306 TTE W/DOPPLER COMPLETE: CPT | Mod: 26

## 2020-05-04 PROCEDURE — 87635 SARS-COV-2 COVID-19 AMP PRB: CPT

## 2020-05-04 PROCEDURE — 93005 ELECTROCARDIOGRAM TRACING: CPT

## 2020-05-04 PROCEDURE — 93970 EXTREMITY STUDY: CPT

## 2020-05-04 PROCEDURE — 82746 ASSAY OF FOLIC ACID SERUM: CPT

## 2020-05-04 PROCEDURE — 93306 TTE W/DOPPLER COMPLETE: CPT

## 2020-05-04 PROCEDURE — 85025 COMPLETE CBC W/AUTO DIFF WBC: CPT

## 2020-05-04 PROCEDURE — 80053 COMPREHEN METABOLIC PANEL: CPT

## 2020-05-04 PROCEDURE — 99285 EMERGENCY DEPT VISIT HI MDM: CPT

## 2020-05-04 PROCEDURE — 83735 ASSAY OF MAGNESIUM: CPT

## 2020-05-04 PROCEDURE — 82607 VITAMIN B-12: CPT

## 2020-05-04 PROCEDURE — 99239 HOSP IP/OBS DSCHRG MGMT >30: CPT

## 2020-05-04 PROCEDURE — 71045 X-RAY EXAM CHEST 1 VIEW: CPT

## 2020-05-04 PROCEDURE — 75574 CT ANGIO HRT W/3D IMAGE: CPT | Mod: 26

## 2020-05-04 PROCEDURE — 84443 ASSAY THYROID STIM HORMONE: CPT

## 2020-05-04 PROCEDURE — 82550 ASSAY OF CK (CPK): CPT

## 2020-05-04 PROCEDURE — 36415 COLL VENOUS BLD VENIPUNCTURE: CPT

## 2020-05-04 RX ORDER — METOPROLOL TARTRATE 50 MG
50 TABLET ORAL DAILY
Refills: 0 | Status: DISCONTINUED | OUTPATIENT
Start: 2020-05-05 | End: 2020-05-04

## 2020-05-04 RX ORDER — METOPROLOL TARTRATE 50 MG
50 TABLET ORAL ONCE
Refills: 0 | Status: COMPLETED | OUTPATIENT
Start: 2020-05-04 | End: 2020-05-04

## 2020-05-04 RX ADMIN — AMLODIPINE BESYLATE 5 MILLIGRAM(S): 2.5 TABLET ORAL at 05:47

## 2020-05-04 RX ADMIN — Medication 81 MILLIGRAM(S): at 11:07

## 2020-05-04 RX ADMIN — Medication 100 MILLIGRAM(S): at 11:07

## 2020-05-04 RX ADMIN — ENOXAPARIN SODIUM 40 MILLIGRAM(S): 100 INJECTION SUBCUTANEOUS at 05:47

## 2020-05-04 RX ADMIN — Medication 50 MILLIGRAM(S): at 10:17

## 2020-05-04 NOTE — DISCHARGE NOTE NURSING/CASE MANAGEMENT/SOCIAL WORK - PATIENT PORTAL LINK FT
You can access the FollowMyHealth Patient Portal offered by [facility] by registering at the following website: [email or web address]. By joining [employer]’s FollowMyHealth portal, you will also be able to view your health information using other applications (apps) compatible with our system.

## 2020-05-04 NOTE — DISCHARGE NOTE PROVIDER - NSDCCPCAREPLAN_GEN_ALL_CORE_FT
PRINCIPAL DISCHARGE DIAGNOSIS  Diagnosis: Chest pain, unspecified, other  Assessment and Plan of Treatment: You presented to the hospital with chest pain. Lab work was drawn and an EKG was peformed and you DID NOT have a heart attack. You also tested NEGATIVE for COVID-19.  A CT scan was performed and you DO NOT have a pulmonary embolism (clot in your lung). An echocardiogram or ultrasound of your heart was perormed which showed a normal pumping function and no significant valvular disease. A Coronary CT scan was performed which showed non-obstructive coronary artery disease. The pain you were experiencing IS NOT cardiac related.  Please follow up with your outpatient primary doctor to work up the etiologoy of your pain which could be related to many things such as musculoskeltetal or nerves. Please continue your home medications without missing any doses. PRINCIPAL DISCHARGE DIAGNOSIS  Diagnosis: Chest pain, unspecified, other  Assessment and Plan of Treatment: You presented to the hospital with chest pain. Lab work was drawn and an EKG was peformed and you DID NOT have a heart attack. You also tested NEGATIVE for COVID-19.  A CT scan was performed and you DO NOT have a pulmonary embolism (clot in your lung). An echocardiogram or ultrasound of your heart was perormed which showed a normal pumping function and no significant valvular disease. A Coronary CT scan was performed which showed non-obstructive coronary artery disease. The pain you were experiencing IS NOT cardiac related.  Please follow up with your outpatient primary doctor to work up the etiologoy of your pain which could be related to many things such as musculoskeltetal or nerves. Please continue your home medications without missing any doses.      SECONDARY DISCHARGE DIAGNOSES  Diagnosis: Pulmonary hypertension  Assessment and Plan of Treatment: Your echocardiogram showed that you have pulmonary hypertension. Pulmonary hypertension is a condition that causes high blood pressure in the blood vessels that carry blood to the lungs. When this happens, the heart has to work harder. This causes people to have trouble breathing and to feel very tired. You will need to have this monitored and worked up outpatient. Please follow up with Dr. Xavier within one month of discharge.

## 2020-05-04 NOTE — DISCHARGE NOTE PROVIDER - CARE PROVIDER_API CALL
Mecca Xavier  [address] [place] [address]  Phone: [phone]  Fax: (   )    -  Follow Up Time: 1 month

## 2020-05-04 NOTE — DISCHARGE NOTE PROVIDER - NSDCMRMEDTOKEN_GEN_ALL_CORE_FT
allopurinol 100 mg oral tablet: 1 tab(s) orally once a day  amLODIPine 5 mg oral tablet: 1 tab(s) orally once a day  metoprolol succinate 50 mg oral tablet, extended release: 1 tab(s) orally once a day

## 2020-05-04 NOTE — DISCHARGE NOTE PROVIDER - HOSPITAL COURSE
89 y/oF  PMHx HTN presented to ED 5/3/20 w/CP and SCHRADER x 3 days. In ED, VS /86, HR 89, Temp 98.4F, RR 20, O2 100% RA. EKG: SR @ 96bpm, with 1st degree AV block, no significant ST-T wave changes, unchanged from prior EKG. CXR no acute infiltrates. COVID negative. CTA Chest PE protocol negative PE. LE duplex no DVT. She was given ASA 325mg and admitted to [address] for r/o ACS. Of note patient with NST 02/2018 which revealed normal myocardial perfusion, with no regional wall abnormalities.         During hospitalization, trop (-) x 3. ECHO performed, which showed normal LV systolic function, grade 1 LV diastolic dysfunction, moderate VH and no significant valvular disease. CTA cardiac performed which showed calcium score 71, non-obstructive CAD and EF 75%. She remains hemodynamically stable and is CP free. She will be discharged to home today as social work set up ambulette and per PT rec- no PT needs.  She will follow up with her PCP as her pain is non-cardiac related. 89 y/oF  PMHx HTN presented to ED 5/3/20 w/CP and SCHRADER x 3 days. In ED, VS /86, HR 89, Temp 98.4F, RR 20, O2 100% RA. EKG: SR @ 96bpm, with 1st degree AV block, no significant ST-T wave changes, unchanged from prior EKG. CXR no acute infiltrates. COVID negative. CTA Chest PE protocol negative PE. LE duplex no DVT. She was given ASA 325mg and admitted to [address] for r/o ACS. Of note patient with NST 02/2018 which revealed normal myocardial perfusion, with no regional wall abnormalities.         During hospitalization, trop (-) x 3. ECHO performed, which showed normal LV systolic function, grade 1 LV diastolic dysfunction, moderate VH and no significant valvular disease and Pulmonary hypertension present, pulmonary artery systolic pressure is 52 mmHg. No evidence of WHO group 2. CTA cardiac performed which showed calcium score 71, non-obstructive CAD and EF 75%. She remains hemodynamically stable and is CP free. She will be discharged to home today as social work set up ambulette and per PT rec- no PT needs.  She will follow up with her PCP as her pain is non-cardiac related.

## 2020-05-04 NOTE — DISCHARGE NOTE PROVIDER - PROVIDER TOKENS
FREE:[LAST:[Duc],FIRST:[Mecca],PHONE:[phone]],FAX:[phone]   [phone]    -],ADDRESS:[[address] [place] [address]],FOLLOWUP:[1 month]]

## 2020-05-06 DIAGNOSIS — I27.20 PULMONARY HYPERTENSION, UNSPECIFIED: ICD-10-CM

## 2020-05-06 DIAGNOSIS — R60.0 LOCALIZED EDEMA: ICD-10-CM

## 2020-05-06 DIAGNOSIS — K44.9 DIAPHRAGMATIC HERNIA WITHOUT OBSTRUCTION OR GANGRENE: ICD-10-CM

## 2020-05-06 DIAGNOSIS — I10 ESSENTIAL (PRIMARY) HYPERTENSION: ICD-10-CM

## 2020-05-06 DIAGNOSIS — I44.0 ATRIOVENTRICULAR BLOCK, FIRST DEGREE: ICD-10-CM

## 2020-05-06 DIAGNOSIS — D50.9 IRON DEFICIENCY ANEMIA, UNSPECIFIED: ICD-10-CM

## 2020-05-06 DIAGNOSIS — H54.7 UNSPECIFIED VISUAL LOSS: ICD-10-CM

## 2020-05-06 DIAGNOSIS — H26.9 UNSPECIFIED CATARACT: ICD-10-CM

## 2020-05-06 DIAGNOSIS — R07.9 CHEST PAIN, UNSPECIFIED: ICD-10-CM

## 2020-05-06 DIAGNOSIS — M10.9 GOUT, UNSPECIFIED: ICD-10-CM

## 2020-05-06 DIAGNOSIS — I25.10 ATHEROSCLEROTIC HEART DISEASE OF NATIVE CORONARY ARTERY WITHOUT ANGINA PECTORIS: ICD-10-CM

## 2020-08-05 NOTE — ED ADULT NURSE NOTE - SUICIDE SCREENING QUESTION 2
Large loose amount of stool present after BM  Patient c/o relief and denies any further discomfort        202 Giovanny Bowie, RN  08/05/20 4837
Patient tolerated enema well w/o complaint  Advised to utilize bedside commode when necessary  Will continue to monitor        202 Giovanny Bowie, RN  08/05/20 5943
No

## 2021-02-22 ENCOUNTER — EMERGENCY (EMERGENCY)
Facility: HOSPITAL | Age: 86
LOS: 1 days | Discharge: HOME | End: 2021-02-22
Attending: EMERGENCY MEDICINE | Admitting: EMERGENCY MEDICINE
Payer: MEDICARE

## 2021-02-22 VITALS
HEIGHT: 62 IN | HEART RATE: 87 BPM | TEMPERATURE: 98 F | DIASTOLIC BLOOD PRESSURE: 79 MMHG | OXYGEN SATURATION: 98 % | SYSTOLIC BLOOD PRESSURE: 159 MMHG | RESPIRATION RATE: 18 BRPM

## 2021-02-22 VITALS
OXYGEN SATURATION: 98 % | HEART RATE: 72 BPM | DIASTOLIC BLOOD PRESSURE: 85 MMHG | RESPIRATION RATE: 18 BRPM | TEMPERATURE: 98 F | SYSTOLIC BLOOD PRESSURE: 166 MMHG

## 2021-02-22 DIAGNOSIS — Z98.49 CATARACT EXTRACTION STATUS, UNSPECIFIED EYE: Chronic | ICD-10-CM

## 2021-02-22 DIAGNOSIS — L29.9 PRURITUS, UNSPECIFIED: ICD-10-CM

## 2021-02-22 DIAGNOSIS — S82.892A OTHER FRACTURE OF LEFT LOWER LEG, INITIAL ENCOUNTER FOR CLOSED FRACTURE: Chronic | ICD-10-CM

## 2021-02-22 DIAGNOSIS — R06.02 SHORTNESS OF BREATH: ICD-10-CM

## 2021-02-22 DIAGNOSIS — L50.0 ALLERGIC URTICARIA: ICD-10-CM

## 2021-02-22 DIAGNOSIS — Z79.899 OTHER LONG TERM (CURRENT) DRUG THERAPY: ICD-10-CM

## 2021-02-22 DIAGNOSIS — I10 ESSENTIAL (PRIMARY) HYPERTENSION: ICD-10-CM

## 2021-02-22 LAB
ALBUMIN SERPL ELPH-MCNC: 3.3 G/DL — SIGNIFICANT CHANGE UP (ref 3.3–5)
ALBUMIN SERPL ELPH-MCNC: 3.4 G/DL — SIGNIFICANT CHANGE UP (ref 3.3–5)
ALP SERPL-CCNC: 103 U/L — SIGNIFICANT CHANGE UP (ref 40–120)
ALP SERPL-CCNC: SIGNIFICANT CHANGE UP U/L (ref 40–120)
ALT FLD-CCNC: 8 U/L — LOW (ref 10–45)
ALT FLD-CCNC: SIGNIFICANT CHANGE UP U/L (ref 10–45)
ANION GAP SERPL CALC-SCNC: 11 MMOL/L — SIGNIFICANT CHANGE UP (ref 5–17)
ANION GAP SERPL CALC-SCNC: 15 MMOL/L — SIGNIFICANT CHANGE UP (ref 5–17)
AST SERPL-CCNC: 14 U/L — SIGNIFICANT CHANGE UP (ref 10–40)
AST SERPL-CCNC: SIGNIFICANT CHANGE UP U/L (ref 10–40)
BASOPHILS # BLD AUTO: 0.14 K/UL — SIGNIFICANT CHANGE UP (ref 0–0.2)
BASOPHILS NFR BLD AUTO: 1.7 % — SIGNIFICANT CHANGE UP (ref 0–2)
BILIRUB SERPL-MCNC: 0.3 MG/DL — SIGNIFICANT CHANGE UP (ref 0.2–1.2)
BILIRUB SERPL-MCNC: 0.4 MG/DL — SIGNIFICANT CHANGE UP (ref 0.2–1.2)
BUN SERPL-MCNC: 11 MG/DL — SIGNIFICANT CHANGE UP (ref 7–23)
BUN SERPL-MCNC: 12 MG/DL — SIGNIFICANT CHANGE UP (ref 7–23)
CALCIUM SERPL-MCNC: 9 MG/DL — SIGNIFICANT CHANGE UP (ref 8.4–10.5)
CALCIUM SERPL-MCNC: 9.1 MG/DL — SIGNIFICANT CHANGE UP (ref 8.4–10.5)
CHLORIDE SERPL-SCNC: 104 MMOL/L — SIGNIFICANT CHANGE UP (ref 96–108)
CHLORIDE SERPL-SCNC: 106 MMOL/L — SIGNIFICANT CHANGE UP (ref 96–108)
CO2 SERPL-SCNC: 18 MMOL/L — LOW (ref 22–31)
CO2 SERPL-SCNC: 22 MMOL/L — SIGNIFICANT CHANGE UP (ref 22–31)
CREAT SERPL-MCNC: 0.86 MG/DL — SIGNIFICANT CHANGE UP (ref 0.5–1.3)
CREAT SERPL-MCNC: 0.89 MG/DL — SIGNIFICANT CHANGE UP (ref 0.5–1.3)
EOSINOPHIL # BLD AUTO: 0.21 K/UL — SIGNIFICANT CHANGE UP (ref 0–0.5)
EOSINOPHIL NFR BLD AUTO: 2.6 % — SIGNIFICANT CHANGE UP (ref 0–6)
GLUCOSE SERPL-MCNC: 149 MG/DL — HIGH (ref 70–99)
GLUCOSE SERPL-MCNC: 168 MG/DL — HIGH (ref 70–99)
HCT VFR BLD CALC: 32.8 % — LOW (ref 34.5–45)
HGB BLD-MCNC: 9.4 G/DL — LOW (ref 11.5–15.5)
LYMPHOCYTES # BLD AUTO: 3.43 K/UL — HIGH (ref 1–3.3)
LYMPHOCYTES # BLD AUTO: 42.6 % — SIGNIFICANT CHANGE UP (ref 13–44)
MCHC RBC-ENTMCNC: 20.4 PG — LOW (ref 27–34)
MCHC RBC-ENTMCNC: 28.7 GM/DL — LOW (ref 32–36)
MCV RBC AUTO: 71.3 FL — LOW (ref 80–100)
MONOCYTES # BLD AUTO: 0.42 K/UL — SIGNIFICANT CHANGE UP (ref 0–0.9)
MONOCYTES NFR BLD AUTO: 5.2 % — SIGNIFICANT CHANGE UP (ref 2–14)
NEUTROPHILS # BLD AUTO: 3.29 K/UL — SIGNIFICANT CHANGE UP (ref 1.8–7.4)
NEUTROPHILS NFR BLD AUTO: 40.9 % — LOW (ref 43–77)
PLATELET # BLD AUTO: 528 K/UL — HIGH (ref 150–400)
POTASSIUM SERPL-MCNC: 4.4 MMOL/L — SIGNIFICANT CHANGE UP (ref 3.5–5.3)
POTASSIUM SERPL-MCNC: SIGNIFICANT CHANGE UP MMOL/L (ref 3.5–5.3)
POTASSIUM SERPL-SCNC: 4.4 MMOL/L — SIGNIFICANT CHANGE UP (ref 3.5–5.3)
POTASSIUM SERPL-SCNC: SIGNIFICANT CHANGE UP MMOL/L (ref 3.5–5.3)
PROT SERPL-MCNC: 7.3 G/DL — SIGNIFICANT CHANGE UP (ref 6–8.3)
PROT SERPL-MCNC: 7.8 G/DL — SIGNIFICANT CHANGE UP (ref 6–8.3)
RBC # BLD: 4.6 M/UL — SIGNIFICANT CHANGE UP (ref 3.8–5.2)
RBC # FLD: 20.9 % — HIGH (ref 10.3–14.5)
SODIUM SERPL-SCNC: 137 MMOL/L — SIGNIFICANT CHANGE UP (ref 135–145)
SODIUM SERPL-SCNC: 139 MMOL/L — SIGNIFICANT CHANGE UP (ref 135–145)
WBC # BLD: 8.04 K/UL — SIGNIFICANT CHANGE UP (ref 3.8–10.5)
WBC # FLD AUTO: 8.04 K/UL — SIGNIFICANT CHANGE UP (ref 3.8–10.5)

## 2021-02-22 PROCEDURE — 96374 THER/PROPH/DIAG INJ IV PUSH: CPT

## 2021-02-22 PROCEDURE — 80053 COMPREHEN METABOLIC PANEL: CPT

## 2021-02-22 PROCEDURE — 85025 COMPLETE CBC W/AUTO DIFF WBC: CPT

## 2021-02-22 PROCEDURE — 83880 ASSAY OF NATRIURETIC PEPTIDE: CPT

## 2021-02-22 PROCEDURE — 36415 COLL VENOUS BLD VENIPUNCTURE: CPT

## 2021-02-22 PROCEDURE — 99284 EMERGENCY DEPT VISIT MOD MDM: CPT | Mod: 25

## 2021-02-22 PROCEDURE — 93005 ELECTROCARDIOGRAM TRACING: CPT

## 2021-02-22 PROCEDURE — 96375 TX/PRO/DX INJ NEW DRUG ADDON: CPT

## 2021-02-22 PROCEDURE — 93010 ELECTROCARDIOGRAM REPORT: CPT

## 2021-02-22 PROCEDURE — 85610 PROTHROMBIN TIME: CPT

## 2021-02-22 PROCEDURE — 85730 THROMBOPLASTIN TIME PARTIAL: CPT

## 2021-02-22 RX ORDER — DIPHENHYDRAMINE HCL 50 MG
1 CAPSULE ORAL
Qty: 30 | Refills: 0
Start: 2021-02-22 | End: 2021-02-26

## 2021-02-22 RX ORDER — FAMOTIDINE 10 MG/ML
20 INJECTION INTRAVENOUS ONCE
Refills: 0 | Status: COMPLETED | OUTPATIENT
Start: 2021-02-22 | End: 2021-02-22

## 2021-02-22 RX ORDER — DIPHENHYDRAMINE HCL 50 MG
25 CAPSULE ORAL ONCE
Refills: 0 | Status: COMPLETED | OUTPATIENT
Start: 2021-02-22 | End: 2021-02-22

## 2021-02-22 RX ADMIN — Medication 25 MILLIGRAM(S): at 06:56

## 2021-02-22 RX ADMIN — FAMOTIDINE 20 MILLIGRAM(S): 10 INJECTION INTRAVENOUS at 06:46

## 2021-02-22 RX ADMIN — Medication 125 MILLIGRAM(S): at 06:46

## 2021-02-22 NOTE — ED PROVIDER NOTE - PROGRESS NOTE DETAILS
pt feeling much better after benadryl/steroids/pepcid.  rash/swelling completely resolved. pt requesting dc.  will give rxs steroids/benadryl and instructed to f/u with pmd.  discussed strict return parameters

## 2021-02-22 NOTE — ED ADULT NURSE NOTE - NSIMPLEMENTINTERV_GEN_ALL_ED
Implemented All Fall with Harm Risk Interventions:  [place] to call system. Call bell, personal items and telephone within reach. Instruct patient to call for assistance. Room bathroom lighting operational. Non-slip footwear when patient is off stretcher. Physically safe environment: no spills, clutter or unnecessary equipment. Stretcher in lowest position, wheels locked, appropriate side rails in place. Provide visual cue, wrist band, yellow gown, etc. Monitor gait and stability. Monitor for mental status changes and reorient to person, place, and time. Review medications for side effects contributing to fall risk. Reinforce activity limits and safety measures with patient and family. Provide visual clues: red socks.

## 2021-02-22 NOTE — ED PROVIDER NOTE - PATIENT PORTAL LINK FT
You can access the FollowMyHealth Patient Portal offered by [facility] by registering at the following website: [email or web address]. By joining [employer]’s FollowMyHealth portal, you will also be able to view your health information using other applications (apps) compatible with our system.

## 2021-02-22 NOTE — ED PROVIDER NOTE - OBJECTIVE STATEMENT
89F with a h/o gout and HTN on amlodipine and metoprol, got her 2nd dose of moderna vaccine 5 days ago who p/w itchy rash and hives to bilateral inner arms as well as upper lip swelling since last night at 8pm, associated with briefly feeling SOB, no dysphonia, no cp, no LE swelling, no wheezing, no n/v/ abd pain. She took tylenol with no releif. Denies hx of similar sx, no new food or exposures. No other complaints at this time. 89F with a h/o gout and HTN on amlodipine and metoprolol, got her 2nd dose of moderna vaccine 5 days ago who p/w itchy rash and hives to bilateral inner arms as well as upper lip swelling since last night at 8pm, associated with briefly feeling SOB, no dysphonia, no cp, no LE swelling, no wheezing, no n/v/ abd pain. She took tylenol with no relief. Denies hx of similar sx, no new food or exposures. No other complaints at this time.

## 2021-02-22 NOTE — ED PROVIDER NOTE - NSFOLLOWUPINSTRUCTIONS_ED_ALL_ED_FT
Please follow up with your primary care physician. If you have any problem getting an appointment this week, please call the ED Referral Coordinator at [phone].  Return to the Emergency Department if you have any new or worsening symptoms, or for any other concerns. Please read below for further information.    Allergic Reaction    An allergic reaction is an abnormal reaction to a substance (allergen) by the body's defense system. Common allergens include medicines, food, insect bites or stings, and blood products. The body releases certain proteins into the blood that can cause a variety of symptoms such as an itchy rash, wheezing, swelling of the face/lips/tongue/throat, abdominal pain, nausea or vomiting. An allergic reaction is usually treated with medication. If your health care provider prescribed you an epinephrine injection device, make sure to keep it with you at all times.    SEEK IMMEDIATE MEDICAL CARE IF YOU HAVE ANY OF THE FOLLOWING SYMPTOMS: allergic reaction severe enough that required you to use epinephrine, tightness in your chest, swelling around your lips/tongue/throat, abdominal pain, vomiting or diarrhea, or lightheadedness/dizziness. These symptoms may represent a serious problem that is an emergency. Do not wait to see if the symptoms will go away. Use your auto-injector pen or anaphylaxis kit as you have been instructed. Call 911 and do not drive yourself to the hospital.

## 2021-02-22 NOTE — ED PROVIDER NOTE - CLINICAL SUMMARY MEDICAL DECISION MAKING FREE TEXT BOX
89F with HTN and gout who presents with allergic reaction. Exam shows urticaria, no resp or GI distress.  Pt was given IV steroids, benadryl, and pepcid , plan for observation for symptoms improvement.

## 2021-02-22 NOTE — ED ADULT NURSE NOTE - OBJECTIVE STATEMENT
Patient to the Ed with complaint that around 8 pm she began itching to arms and hands, woke up this morning and itching has worsened associated with swollen lip and mild SOB patient denies allergies, using new detergents, eating new foods, denies difficulty swallowing chest pain or any other symptoms.  Hx of HTN and gout, tasks metoprolol, amlodipine and allopurinol, took all meds yesterday.

## 2021-02-22 NOTE — ED PROVIDER NOTE - PHYSICAL EXAMINATION
GEN: Appears younger than stated age, Well appearing, well nourished, awake, alert, oriented to person, place, time/situation and in no apparent distress. NTAF  ENT: Airway patent, Nasal mucosa clear. Mouth with normal mucosa. No intraoral swelling, no dysphonia, no stridor.   EYES: Clear bilaterally. PERRL, EOMI  RESPIRATORY: Breathing comfortably with normal RR. No W/C/R, no hypoxia or resp distress.  CARDIAC: Regular rate and rhythm, no M/R/G  ABDOMEN: Soft, nontender, +bowel sounds, no rebound, rigidity, or guarding.  MSK: Range of motion is not limited, no deformities noted.  NEURO: Alert and oriented, no focal deficits.  SKIN: Skin normal color for race, warm, dry and intact. hives noted to inner arms and neck, mild upper lip swelling noted.   PSYCH: Alert and oriented to person, place, time/situation. normal mood and affect. no apparent risk to self or others.

## 2021-09-27 NOTE — ED ADULT TRIAGE NOTE - CADM TRG EMS AGENCY
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 Pt complain of chronic neck pain that radiates to his left arm with numbness and tingling. He takes Hydrocodone for pain with moderate pain relief. He is here for PST for planned ACDF.

## 2022-11-28 NOTE — H&P ADULT - VASCULAR
detailed exam Bexarotene Counseling:  I discussed with the patient the risks of bexarotene including but not limited to hair loss, dry lips/skin/eyes, liver abnormalities, hyperlipidemia, pancreatitis, depression/suicidal ideation, photosensitivity, drug rash/allergic reactions, hypothyroidism, anemia, leukopenia, infection, cataracts, and teratogenicity.  Patient understands that they will need regular blood tests to check lipid profile, liver function tests, white blood cell count, thyroid function tests and pregnancy test if applicable.

## 2023-05-09 NOTE — ED PROVIDER NOTE - PRIOR EKG STATUS
Subjective   She states stable focal low back pain.  Intermittently has radiation of pain towards the lateral right hip to the mid thigh rated 6/10 in severity.  Denies lower extremity numbness, weakness, paresthesias.  No new or worsening complaints.  Was excepted to rehab, awaiting bed availability  Objective     I/O's    Intake/Output Summary (Last 24 hours) at 5/9/2023 0916  Last data filed at 5/8/2023 2031  Gross per 24 hour   Intake 980 ml   Output --   Net 980 ml       Last Recorded Vitals  Blood pressure 123/85, pulse 80, temperature 97.9 °F (36.6 °C), temperature source Temporal, resp. rate 14, height 5' 4\" (1.626 m), weight 57.7 kg (127 lb 3.3 oz), SpO2 97 %.  Body mass index is 21.83 kg/m².    Physical Exam  reclined in bed, NAD  Face symmetric, speech fluent  Alert  BLE strength 5/5 including hip flexion, knee extension, ankle plantar and dorsiflexion  BLE sensation grossly intact, mild chronic bilateral foot neuropathy  Incision CDI    Assessment & Plan   68yo F s/p L4-5 fusion with Dr. Boothe, POD 5     Exam stable  Patient progressing postoperatively expected  adjusted  Advance activity as tolerated, PT/OT  Pain management on consult, appreciate input     Okay to transfer to rehab from neurosurgery standpoint when cleared by other services.  Outpatient follow-up in 3 weeks with the attending as scheduled.     Discussed with attending       Osiel Elmore PA-C         the EKG is unchanged from prior EKG

## 2023-08-22 ENCOUNTER — TRANSCRIPTION ENCOUNTER (OUTPATIENT)
Age: 88
End: 2023-08-22

## 2023-08-22 ENCOUNTER — INPATIENT (INPATIENT)
Facility: HOSPITAL | Age: 88
LOS: 2 days | Discharge: HOME HEALTH | DRG: 444 | End: 2023-08-25
Attending: SURGERY | Admitting: SURGERY
Payer: MEDICARE

## 2023-08-22 VITALS
TEMPERATURE: 98 F | OXYGEN SATURATION: 97 % | HEART RATE: 80 BPM | HEIGHT: 62 IN | WEIGHT: 192.02 LBS | RESPIRATION RATE: 20 BRPM

## 2023-08-22 DIAGNOSIS — K85.10 BILIARY ACUTE PANCREATITIS WITHOUT NECROSIS OR INFECTION: ICD-10-CM

## 2023-08-22 DIAGNOSIS — I27.20 PULMONARY HYPERTENSION, UNSPECIFIED: ICD-10-CM

## 2023-08-22 DIAGNOSIS — Z41.8 ENCOUNTER FOR OTHER PROCEDURES FOR PURPOSES OTHER THAN REMEDYING HEALTH STATE: ICD-10-CM

## 2023-08-22 DIAGNOSIS — S82.892A OTHER FRACTURE OF LEFT LOWER LEG, INITIAL ENCOUNTER FOR CLOSED FRACTURE: Chronic | ICD-10-CM

## 2023-08-22 DIAGNOSIS — K80.31 CALCULUS OF BILE DUCT WITH CHOLANGITIS, UNSPECIFIED, WITH OBSTRUCTION: ICD-10-CM

## 2023-08-22 DIAGNOSIS — Z79.51 LONG TERM (CURRENT) USE OF INHALED STEROIDS: ICD-10-CM

## 2023-08-22 DIAGNOSIS — I10 ESSENTIAL (PRIMARY) HYPERTENSION: ICD-10-CM

## 2023-08-22 DIAGNOSIS — R01.1 CARDIAC MURMUR, UNSPECIFIED: ICD-10-CM

## 2023-08-22 DIAGNOSIS — I25.10 ATHEROSCLEROTIC HEART DISEASE OF NATIVE CORONARY ARTERY WITHOUT ANGINA PECTORIS: ICD-10-CM

## 2023-08-22 DIAGNOSIS — M10.9 GOUT, UNSPECIFIED: ICD-10-CM

## 2023-08-22 DIAGNOSIS — H54.40 BLINDNESS, ONE EYE, UNSPECIFIED EYE: ICD-10-CM

## 2023-08-22 DIAGNOSIS — Z98.49 CATARACT EXTRACTION STATUS, UNSPECIFIED EYE: Chronic | ICD-10-CM

## 2023-08-22 DIAGNOSIS — K80.50 CALCULUS OF BILE DUCT WITHOUT CHOLANGITIS OR CHOLECYSTITIS WITHOUT OBSTRUCTION: ICD-10-CM

## 2023-08-22 LAB
A1C WITH ESTIMATED AVERAGE GLUCOSE RESULT: 6.1 % — HIGH (ref 4–5.6)
ALBUMIN SERPL ELPH-MCNC: 3 G/DL — LOW (ref 3.3–5)
ALBUMIN SERPL ELPH-MCNC: 3.2 G/DL — LOW (ref 3.3–5)
ALBUMIN SERPL ELPH-MCNC: 3.5 G/DL — SIGNIFICANT CHANGE UP (ref 3.3–5)
ALP SERPL-CCNC: 229 U/L — HIGH (ref 40–120)
ALP SERPL-CCNC: 232 U/L — HIGH (ref 40–120)
ALP SERPL-CCNC: 262 U/L — HIGH (ref 40–120)
ALT FLD-CCNC: 134 U/L — HIGH (ref 10–45)
ALT FLD-CCNC: 152 U/L — HIGH (ref 10–45)
ALT FLD-CCNC: 160 U/L — HIGH (ref 10–45)
ANION GAP SERPL CALC-SCNC: 11 MMOL/L — SIGNIFICANT CHANGE UP (ref 5–17)
ANION GAP SERPL CALC-SCNC: 11 MMOL/L — SIGNIFICANT CHANGE UP (ref 5–17)
ANION GAP SERPL CALC-SCNC: 13 MMOL/L — SIGNIFICANT CHANGE UP (ref 5–17)
ANISOCYTOSIS BLD QL: SLIGHT — SIGNIFICANT CHANGE UP
APPEARANCE UR: CLEAR — SIGNIFICANT CHANGE UP
APTT BLD: 25 SEC — SIGNIFICANT CHANGE UP (ref 24.5–35.6)
AST SERPL-CCNC: 143 U/L — HIGH (ref 10–40)
AST SERPL-CCNC: 158 U/L — HIGH (ref 10–40)
AST SERPL-CCNC: 95 U/L — HIGH (ref 10–40)
BACTERIA # UR AUTO: PRESENT /HPF
BASOPHILS # BLD AUTO: 0 K/UL — SIGNIFICANT CHANGE UP (ref 0–0.2)
BASOPHILS NFR BLD AUTO: 0 % — SIGNIFICANT CHANGE UP (ref 0–2)
BILIRUB DIRECT SERPL-MCNC: 2.2 MG/DL — HIGH (ref 0–0.3)
BILIRUB DIRECT SERPL-MCNC: 2.8 MG/DL — HIGH (ref 0–0.3)
BILIRUB INDIRECT FLD-MCNC: 0.2 MG/DL — SIGNIFICANT CHANGE UP (ref 0.2–1)
BILIRUB INDIRECT FLD-MCNC: 0.3 MG/DL — SIGNIFICANT CHANGE UP (ref 0.2–1)
BILIRUB SERPL-MCNC: 1.7 MG/DL — HIGH (ref 0.2–1.2)
BILIRUB SERPL-MCNC: 2.5 MG/DL — HIGH (ref 0.2–1.2)
BILIRUB SERPL-MCNC: 3.1 MG/DL — HIGH (ref 0.2–1.2)
BILIRUB UR-MCNC: ABNORMAL
BLD GP AB SCN SERPL QL: NEGATIVE — SIGNIFICANT CHANGE UP
BUN SERPL-MCNC: 10 MG/DL — SIGNIFICANT CHANGE UP (ref 7–23)
BUN SERPL-MCNC: 13 MG/DL — SIGNIFICANT CHANGE UP (ref 7–23)
BUN SERPL-MCNC: 17 MG/DL — SIGNIFICANT CHANGE UP (ref 7–23)
CALCIUM SERPL-MCNC: 8.8 MG/DL — SIGNIFICANT CHANGE UP (ref 8.4–10.5)
CALCIUM SERPL-MCNC: 8.9 MG/DL — SIGNIFICANT CHANGE UP (ref 8.4–10.5)
CALCIUM SERPL-MCNC: 9.4 MG/DL — SIGNIFICANT CHANGE UP (ref 8.4–10.5)
CHLORIDE SERPL-SCNC: 103 MMOL/L — SIGNIFICANT CHANGE UP (ref 96–108)
CHLORIDE SERPL-SCNC: 105 MMOL/L — SIGNIFICANT CHANGE UP (ref 96–108)
CHLORIDE SERPL-SCNC: 105 MMOL/L — SIGNIFICANT CHANGE UP (ref 96–108)
CO2 SERPL-SCNC: 21 MMOL/L — LOW (ref 22–31)
CO2 SERPL-SCNC: 22 MMOL/L — SIGNIFICANT CHANGE UP (ref 22–31)
CO2 SERPL-SCNC: 22 MMOL/L — SIGNIFICANT CHANGE UP (ref 22–31)
COLOR SPEC: YELLOW — SIGNIFICANT CHANGE UP
COMMENT - URINE: SIGNIFICANT CHANGE UP
CREAT SERPL-MCNC: 0.84 MG/DL — SIGNIFICANT CHANGE UP (ref 0.5–1.3)
CREAT SERPL-MCNC: 0.84 MG/DL — SIGNIFICANT CHANGE UP (ref 0.5–1.3)
CREAT SERPL-MCNC: 0.9 MG/DL — SIGNIFICANT CHANGE UP (ref 0.5–1.3)
DACRYOCYTES BLD QL SMEAR: SLIGHT — SIGNIFICANT CHANGE UP
DIFF PNL FLD: NEGATIVE — SIGNIFICANT CHANGE UP
EGFR: 60 ML/MIN/1.73M2 — SIGNIFICANT CHANGE UP
EGFR: 65 ML/MIN/1.73M2 — SIGNIFICANT CHANGE UP
EGFR: 65 ML/MIN/1.73M2 — SIGNIFICANT CHANGE UP
EOSINOPHIL # BLD AUTO: 0 K/UL — SIGNIFICANT CHANGE UP (ref 0–0.5)
EOSINOPHIL NFR BLD AUTO: 0 % — SIGNIFICANT CHANGE UP (ref 0–6)
EPI CELLS # UR: SIGNIFICANT CHANGE UP /HPF (ref 0–5)
ESTIMATED AVERAGE GLUCOSE: 128 MG/DL — HIGH (ref 68–114)
GLUCOSE BLDC GLUCOMTR-MCNC: 138 MG/DL — HIGH (ref 70–99)
GLUCOSE SERPL-MCNC: 142 MG/DL — HIGH (ref 70–99)
GLUCOSE SERPL-MCNC: 199 MG/DL — HIGH (ref 70–99)
GLUCOSE SERPL-MCNC: 221 MG/DL — HIGH (ref 70–99)
GLUCOSE UR QL: NEGATIVE — SIGNIFICANT CHANGE UP
HCT VFR BLD CALC: 34 % — LOW (ref 34.5–45)
HCT VFR BLD CALC: 35.5 % — SIGNIFICANT CHANGE UP (ref 34.5–45)
HCT VFR BLD CALC: 38.4 % — SIGNIFICANT CHANGE UP (ref 34.5–45)
HGB BLD-MCNC: 10.8 G/DL — LOW (ref 11.5–15.5)
HGB BLD-MCNC: 10.9 G/DL — LOW (ref 11.5–15.5)
HGB BLD-MCNC: 11.9 G/DL — SIGNIFICANT CHANGE UP (ref 11.5–15.5)
INR BLD: 1.14 — SIGNIFICANT CHANGE UP (ref 0.85–1.18)
KETONES UR-MCNC: NEGATIVE — SIGNIFICANT CHANGE UP
LEUKOCYTE ESTERASE UR-ACNC: NEGATIVE — SIGNIFICANT CHANGE UP
LIDOCAIN IGE QN: 1479 U/L — HIGH (ref 7–60)
LYMPHOCYTES # BLD AUTO: 0.85 K/UL — LOW (ref 1–3.3)
LYMPHOCYTES # BLD AUTO: 5.3 % — LOW (ref 13–44)
MAGNESIUM SERPL-MCNC: 1.7 MG/DL — SIGNIFICANT CHANGE UP (ref 1.6–2.6)
MAGNESIUM SERPL-MCNC: 1.8 MG/DL — SIGNIFICANT CHANGE UP (ref 1.6–2.6)
MANUAL SMEAR VERIFICATION: SIGNIFICANT CHANGE UP
MCHC RBC-ENTMCNC: 24 PG — LOW (ref 27–34)
MCHC RBC-ENTMCNC: 24.2 PG — LOW (ref 27–34)
MCHC RBC-ENTMCNC: 24.4 PG — LOW (ref 27–34)
MCHC RBC-ENTMCNC: 30.7 GM/DL — LOW (ref 32–36)
MCHC RBC-ENTMCNC: 31 GM/DL — LOW (ref 32–36)
MCHC RBC-ENTMCNC: 31.8 GM/DL — LOW (ref 32–36)
MCV RBC AUTO: 76.7 FL — LOW (ref 80–100)
MCV RBC AUTO: 78 FL — LOW (ref 80–100)
MCV RBC AUTO: 78.2 FL — LOW (ref 80–100)
MICROCYTES BLD QL: SLIGHT — SIGNIFICANT CHANGE UP
MONOCYTES # BLD AUTO: 1.56 K/UL — HIGH (ref 0–0.9)
MONOCYTES NFR BLD AUTO: 9.7 % — SIGNIFICANT CHANGE UP (ref 2–14)
NEUTROPHILS # BLD AUTO: 13.63 K/UL — HIGH (ref 1.8–7.4)
NEUTROPHILS NFR BLD AUTO: 85 % — HIGH (ref 43–77)
NITRITE UR-MCNC: NEGATIVE — SIGNIFICANT CHANGE UP
NRBC # BLD: 0 /100 WBCS — SIGNIFICANT CHANGE UP (ref 0–0)
NRBC # BLD: 0 /100 WBCS — SIGNIFICANT CHANGE UP (ref 0–0)
OVALOCYTES BLD QL SMEAR: SLIGHT — SIGNIFICANT CHANGE UP
PH UR: 5.5 — SIGNIFICANT CHANGE UP (ref 5–8)
PHOSPHATE SERPL-MCNC: 3.5 MG/DL — SIGNIFICANT CHANGE UP (ref 2.5–4.5)
PHOSPHATE SERPL-MCNC: 4.2 MG/DL — SIGNIFICANT CHANGE UP (ref 2.5–4.5)
PLAT MORPH BLD: ABNORMAL
PLATELET # BLD AUTO: 390 K/UL — SIGNIFICANT CHANGE UP (ref 150–400)
PLATELET # BLD AUTO: 396 K/UL — SIGNIFICANT CHANGE UP (ref 150–400)
PLATELET # BLD AUTO: 424 K/UL — HIGH (ref 150–400)
POIKILOCYTOSIS BLD QL AUTO: SLIGHT — SIGNIFICANT CHANGE UP
POLYCHROMASIA BLD QL SMEAR: SLIGHT — SIGNIFICANT CHANGE UP
POTASSIUM SERPL-MCNC: 3.7 MMOL/L — SIGNIFICANT CHANGE UP (ref 3.5–5.3)
POTASSIUM SERPL-MCNC: 3.8 MMOL/L — SIGNIFICANT CHANGE UP (ref 3.5–5.3)
POTASSIUM SERPL-MCNC: 4.2 MMOL/L — SIGNIFICANT CHANGE UP (ref 3.5–5.3)
POTASSIUM SERPL-SCNC: 3.7 MMOL/L — SIGNIFICANT CHANGE UP (ref 3.5–5.3)
POTASSIUM SERPL-SCNC: 3.8 MMOL/L — SIGNIFICANT CHANGE UP (ref 3.5–5.3)
POTASSIUM SERPL-SCNC: 4.2 MMOL/L — SIGNIFICANT CHANGE UP (ref 3.5–5.3)
PROT SERPL-MCNC: 7 G/DL — SIGNIFICANT CHANGE UP (ref 6–8.3)
PROT SERPL-MCNC: 7.2 G/DL — SIGNIFICANT CHANGE UP (ref 6–8.3)
PROT SERPL-MCNC: 8.2 G/DL — SIGNIFICANT CHANGE UP (ref 6–8.3)
PROT UR-MCNC: ABNORMAL MG/DL
PROTHROM AB SERPL-ACNC: 12.9 SEC — SIGNIFICANT CHANGE UP (ref 9.5–13)
RBC # BLD: 4.43 M/UL — SIGNIFICANT CHANGE UP (ref 3.8–5.2)
RBC # BLD: 4.54 M/UL — SIGNIFICANT CHANGE UP (ref 3.8–5.2)
RBC # BLD: 4.92 M/UL — SIGNIFICANT CHANGE UP (ref 3.8–5.2)
RBC # FLD: 21.8 % — HIGH (ref 10.3–14.5)
RBC # FLD: 22.2 % — HIGH (ref 10.3–14.5)
RBC # FLD: 22.2 % — HIGH (ref 10.3–14.5)
RBC BLD AUTO: ABNORMAL
RBC CASTS # UR COMP ASSIST: < 5 /HPF — SIGNIFICANT CHANGE UP
RH IG SCN BLD-IMP: POSITIVE — SIGNIFICANT CHANGE UP
SODIUM SERPL-SCNC: 137 MMOL/L — SIGNIFICANT CHANGE UP (ref 135–145)
SODIUM SERPL-SCNC: 138 MMOL/L — SIGNIFICANT CHANGE UP (ref 135–145)
SODIUM SERPL-SCNC: 138 MMOL/L — SIGNIFICANT CHANGE UP (ref 135–145)
SP GR SPEC: 1.02 — SIGNIFICANT CHANGE UP (ref 1–1.03)
TROPONIN T, HIGH SENSITIVITY RESULT: 7 NG/L — SIGNIFICANT CHANGE UP (ref 0–51)
UROBILINOGEN FLD QL: 1 E.U./DL — SIGNIFICANT CHANGE UP
WBC # BLD: 16.04 K/UL — HIGH (ref 3.8–10.5)
WBC # BLD: 20.76 K/UL — HIGH (ref 3.8–10.5)
WBC # BLD: 28.3 K/UL — HIGH (ref 3.8–10.5)
WBC # FLD AUTO: 16.04 K/UL — HIGH (ref 3.8–10.5)
WBC # FLD AUTO: 20.76 K/UL — HIGH (ref 3.8–10.5)
WBC # FLD AUTO: 28.3 K/UL — HIGH (ref 3.8–10.5)
WBC UR QL: < 5 /HPF — SIGNIFICANT CHANGE UP

## 2023-08-22 PROCEDURE — 76705 ECHO EXAM OF ABDOMEN: CPT | Mod: 26

## 2023-08-22 PROCEDURE — 99285 EMERGENCY DEPT VISIT HI MDM: CPT

## 2023-08-22 PROCEDURE — 93010 ELECTROCARDIOGRAM REPORT: CPT

## 2023-08-22 PROCEDURE — 43262 ENDO CHOLANGIOPANCREATOGRAPH: CPT | Mod: 59

## 2023-08-22 PROCEDURE — 99221 1ST HOSP IP/OBS SF/LOW 40: CPT

## 2023-08-22 PROCEDURE — 99222 1ST HOSP IP/OBS MODERATE 55: CPT | Mod: GC

## 2023-08-22 PROCEDURE — 99223 1ST HOSP IP/OBS HIGH 75: CPT | Mod: 25

## 2023-08-22 PROCEDURE — 71045 X-RAY EXAM CHEST 1 VIEW: CPT | Mod: 26

## 2023-08-22 PROCEDURE — 74177 CT ABD & PELVIS W/CONTRAST: CPT | Mod: 26,MA

## 2023-08-22 PROCEDURE — 76937 US GUIDE VASCULAR ACCESS: CPT | Mod: 26

## 2023-08-22 PROCEDURE — 36000 PLACE NEEDLE IN VEIN: CPT

## 2023-08-22 PROCEDURE — 43274 ERCP DUCT STENT PLACEMENT: CPT

## 2023-08-22 PROCEDURE — 74328 X-RAY BILE DUCT ENDOSCOPY: CPT | Mod: 26

## 2023-08-22 DEVICE — HYDRATOME 44: Type: IMPLANTABLE DEVICE | Status: FUNCTIONAL

## 2023-08-22 DEVICE — STENT BIL ADVANIX 10FRX5CM PRELOADED: Type: IMPLANTABLE DEVICE | Status: FUNCTIONAL

## 2023-08-22 RX ORDER — MORPHINE SULFATE 50 MG/1
2 CAPSULE, EXTENDED RELEASE ORAL ONCE
Refills: 0 | Status: DISCONTINUED | OUTPATIENT
Start: 2023-08-22 | End: 2023-08-22

## 2023-08-22 RX ORDER — DEXTROSE 50 % IN WATER 50 %
25 SYRINGE (ML) INTRAVENOUS ONCE
Refills: 0 | Status: DISCONTINUED | OUTPATIENT
Start: 2023-08-22 | End: 2023-08-25

## 2023-08-22 RX ORDER — CHLORHEXIDINE GLUCONATE 213 G/1000ML
1 SOLUTION TOPICAL DAILY
Refills: 0 | Status: DISCONTINUED | OUTPATIENT
Start: 2023-08-22 | End: 2023-08-24

## 2023-08-22 RX ORDER — HYDROMORPHONE HYDROCHLORIDE 2 MG/ML
0.25 INJECTION INTRAMUSCULAR; INTRAVENOUS; SUBCUTANEOUS
Refills: 0 | Status: DISCONTINUED | OUTPATIENT
Start: 2023-08-22 | End: 2023-08-25

## 2023-08-22 RX ORDER — CEFTRIAXONE 500 MG/1
2000 INJECTION, POWDER, FOR SOLUTION INTRAMUSCULAR; INTRAVENOUS EVERY 24 HOURS
Refills: 0 | Status: DISCONTINUED | OUTPATIENT
Start: 2023-08-22 | End: 2023-08-25

## 2023-08-22 RX ORDER — SODIUM CHLORIDE 9 MG/ML
1000 INJECTION, SOLUTION INTRAVENOUS
Refills: 0 | Status: DISCONTINUED | OUTPATIENT
Start: 2023-08-22 | End: 2023-08-25

## 2023-08-22 RX ORDER — ONDANSETRON 8 MG/1
4 TABLET, FILM COATED ORAL EVERY 8 HOURS
Refills: 0 | Status: DISCONTINUED | OUTPATIENT
Start: 2023-08-22 | End: 2023-08-25

## 2023-08-22 RX ORDER — HEPARIN SODIUM 5000 [USP'U]/ML
7500 INJECTION INTRAVENOUS; SUBCUTANEOUS EVERY 8 HOURS
Refills: 0 | Status: DISCONTINUED | OUTPATIENT
Start: 2023-08-22 | End: 2023-08-23

## 2023-08-22 RX ORDER — GLUCAGON INJECTION, SOLUTION 0.5 MG/.1ML
1 INJECTION, SOLUTION SUBCUTANEOUS ONCE
Refills: 0 | Status: DISCONTINUED | OUTPATIENT
Start: 2023-08-22 | End: 2023-08-25

## 2023-08-22 RX ORDER — INSULIN LISPRO 100/ML
VIAL (ML) SUBCUTANEOUS
Refills: 0 | Status: DISCONTINUED | OUTPATIENT
Start: 2023-08-22 | End: 2023-08-24

## 2023-08-22 RX ORDER — POTASSIUM CHLORIDE 20 MEQ
10 PACKET (EA) ORAL
Refills: 0 | Status: COMPLETED | OUTPATIENT
Start: 2023-08-22 | End: 2023-08-22

## 2023-08-22 RX ORDER — PIPERACILLIN AND TAZOBACTAM 4; .5 G/20ML; G/20ML
3.38 INJECTION, POWDER, LYOPHILIZED, FOR SOLUTION INTRAVENOUS ONCE
Refills: 0 | Status: COMPLETED | OUTPATIENT
Start: 2023-08-22 | End: 2023-08-22

## 2023-08-22 RX ORDER — SODIUM CHLORIDE 9 MG/ML
1000 INJECTION INTRAMUSCULAR; INTRAVENOUS; SUBCUTANEOUS ONCE
Refills: 0 | Status: COMPLETED | OUTPATIENT
Start: 2023-08-22 | End: 2023-08-22

## 2023-08-22 RX ORDER — SODIUM CHLORIDE 9 MG/ML
1000 INJECTION, SOLUTION INTRAVENOUS
Refills: 0 | Status: DISCONTINUED | OUTPATIENT
Start: 2023-08-22 | End: 2023-08-23

## 2023-08-22 RX ORDER — MAGNESIUM SULFATE 500 MG/ML
1 VIAL (ML) INJECTION ONCE
Refills: 0 | Status: COMPLETED | OUTPATIENT
Start: 2023-08-22 | End: 2023-08-22

## 2023-08-22 RX ORDER — HYDROMORPHONE HYDROCHLORIDE 2 MG/ML
0.5 INJECTION INTRAMUSCULAR; INTRAVENOUS; SUBCUTANEOUS
Refills: 0 | Status: DISCONTINUED | OUTPATIENT
Start: 2023-08-22 | End: 2023-08-25

## 2023-08-22 RX ORDER — HEPARIN SODIUM 5000 [USP'U]/ML
5000 INJECTION INTRAVENOUS; SUBCUTANEOUS EVERY 8 HOURS
Refills: 0 | Status: DISCONTINUED | OUTPATIENT
Start: 2023-08-22 | End: 2023-08-22

## 2023-08-22 RX ORDER — METRONIDAZOLE 500 MG
500 TABLET ORAL EVERY 8 HOURS
Refills: 0 | Status: DISCONTINUED | OUTPATIENT
Start: 2023-08-22 | End: 2023-08-25

## 2023-08-22 RX ORDER — ACETAMINOPHEN 500 MG
1000 TABLET ORAL ONCE
Refills: 0 | Status: DISCONTINUED | OUTPATIENT
Start: 2023-08-22 | End: 2023-08-25

## 2023-08-22 RX ORDER — PIPERACILLIN AND TAZOBACTAM 4; .5 G/20ML; G/20ML
3.38 INJECTION, POWDER, LYOPHILIZED, FOR SOLUTION INTRAVENOUS EVERY 8 HOURS
Refills: 0 | Status: DISCONTINUED | OUTPATIENT
Start: 2023-08-22 | End: 2023-08-22

## 2023-08-22 RX ORDER — FAMOTIDINE 10 MG/ML
20 INJECTION INTRAVENOUS ONCE
Refills: 0 | Status: COMPLETED | OUTPATIENT
Start: 2023-08-22 | End: 2023-08-22

## 2023-08-22 RX ORDER — ONDANSETRON 8 MG/1
4 TABLET, FILM COATED ORAL ONCE
Refills: 0 | Status: COMPLETED | OUTPATIENT
Start: 2023-08-22 | End: 2023-08-22

## 2023-08-22 RX ORDER — DEXTROSE 50 % IN WATER 50 %
12.5 SYRINGE (ML) INTRAVENOUS ONCE
Refills: 0 | Status: DISCONTINUED | OUTPATIENT
Start: 2023-08-22 | End: 2023-08-25

## 2023-08-22 RX ORDER — DEXTROSE 50 % IN WATER 50 %
15 SYRINGE (ML) INTRAVENOUS ONCE
Refills: 0 | Status: DISCONTINUED | OUTPATIENT
Start: 2023-08-22 | End: 2023-08-25

## 2023-08-22 RX ORDER — CHLORHEXIDINE GLUCONATE 213 G/1000ML
1 SOLUTION TOPICAL
Refills: 0 | Status: DISCONTINUED | OUTPATIENT
Start: 2023-08-22 | End: 2023-08-24

## 2023-08-22 RX ADMIN — Medication 100 MILLIGRAM(S): at 11:12

## 2023-08-22 RX ADMIN — FAMOTIDINE 20 MILLIGRAM(S): 10 INJECTION INTRAVENOUS at 03:08

## 2023-08-22 RX ADMIN — Medication 100 MILLIGRAM(S): at 19:21

## 2023-08-22 RX ADMIN — ONDANSETRON 4 MILLIGRAM(S): 8 TABLET, FILM COATED ORAL at 17:45

## 2023-08-22 RX ADMIN — MORPHINE SULFATE 2 MILLIGRAM(S): 50 CAPSULE, EXTENDED RELEASE ORAL at 06:51

## 2023-08-22 RX ADMIN — Medication 100 MILLIEQUIVALENT(S): at 22:01

## 2023-08-22 RX ADMIN — ONDANSETRON 4 MILLIGRAM(S): 8 TABLET, FILM COATED ORAL at 03:08

## 2023-08-22 RX ADMIN — MORPHINE SULFATE 2 MILLIGRAM(S): 50 CAPSULE, EXTENDED RELEASE ORAL at 06:28

## 2023-08-22 RX ADMIN — HEPARIN SODIUM 7500 UNIT(S): 5000 INJECTION INTRAVENOUS; SUBCUTANEOUS at 22:01

## 2023-08-22 RX ADMIN — Medication 30 MILLILITER(S): at 03:09

## 2023-08-22 RX ADMIN — SODIUM CHLORIDE 110 MILLILITER(S): 9 INJECTION, SOLUTION INTRAVENOUS at 20:48

## 2023-08-22 RX ADMIN — Medication 100 GRAM(S): at 19:45

## 2023-08-22 RX ADMIN — SODIUM CHLORIDE 1000 MILLILITER(S): 9 INJECTION INTRAMUSCULAR; INTRAVENOUS; SUBCUTANEOUS at 07:52

## 2023-08-22 RX ADMIN — Medication 100 MILLIEQUIVALENT(S): at 20:48

## 2023-08-22 RX ADMIN — MORPHINE SULFATE 2 MILLIGRAM(S): 50 CAPSULE, EXTENDED RELEASE ORAL at 04:12

## 2023-08-22 RX ADMIN — SODIUM CHLORIDE 110 MILLILITER(S): 9 INJECTION, SOLUTION INTRAVENOUS at 09:57

## 2023-08-22 RX ADMIN — PIPERACILLIN AND TAZOBACTAM 200 GRAM(S): 4; .5 INJECTION, POWDER, LYOPHILIZED, FOR SOLUTION INTRAVENOUS at 06:28

## 2023-08-22 RX ADMIN — CEFTRIAXONE 100 MILLIGRAM(S): 500 INJECTION, POWDER, FOR SOLUTION INTRAMUSCULAR; INTRAVENOUS at 09:58

## 2023-08-22 NOTE — ED PROVIDER NOTE - OBJECTIVE STATEMENT
89 yr old female, history of htn, gout, presents to the Emergency Department w abdominal pain. pt w upper abd pain radiating into back since 8-9pm. nausea w few episodes of nonbloody emesis. no hx of similar pain. last BM yesterday, normal, nonbloody.  no chest pain, sob, leg swelling, near-syncope / syncope. no fever. no headache, dizziness, vision changes, extremity weakness / numbness / tingling.  no hx abd surgeries.

## 2023-08-22 NOTE — H&P ADULT - NSHPPHYSICALEXAM_GEN_ALL_CORE
T(C): 36.9 (08-22-23 @ 05:30), Max: 36.9 (08-22-23 @ 05:30)  HR: 100 (08-22-23 @ 07:11) (80 - 100)  BP: 112/57 (08-22-23 @ 07:11) (112/57 - 165/76)  RR: 18 (08-22-23 @ 07:11) (18 - 20)  SpO2: 95% (08-22-23 @ 07:11) (95% - 97%)    CONSTITUTIONAL: Well groomed, no apparent distress  RESP: No respiratory distress, no use of accessory muscles;   CV:  +S1S2, no JVD; no peripheral edema  GI: Soft,, ND, tender to deep palpation on RUQ, no rebound, no guarding; no palpable masses; no hepatosplenomegaly; no hernia palpated  Extremities: WWP, normal strength  SKIN: No rashes or ulcers noted; no subcutaneous nodules or induration palpable  NEURO: CN II-XII intact; normal reflexes in upper and lower extremities, sensation intact in upper and lower extremities b/l to light touch   PSYCH: Appropriate insight/judgment; A+O x 3, mood and affect appropriate, recent/remote memory intact

## 2023-08-22 NOTE — CONSULT NOTE ADULT - REASON FOR ADMISSION
obstructive gallstone pancreatitis

## 2023-08-22 NOTE — ED PROVIDER NOTE - PHYSICAL EXAMINATION
Constitutional : appears uncomfortable but non-toxic, no acute distress. awake, alert, oriented to person, place, time/situation.  Head : head normocephalic, atraumatic  EENMT : eyes clear bilaterally, PERRL, EOMI. airway patent. moist mucous membranes. neck supple.  Cardiac : Normal rate, regular rhythm. No murmur appreciated, no LE edema.  Resp : Breath sounds clear and equal bilaterally. Respirations even and unlabored.   Gastro : abdomen soft, nondistended. +epigastric and RUQ tenderness. no rebound or guarding. no CVAT.  MSK :  range of motion is not limited, no muscle or joint tenderness  Back : No evidence of trauma. No spinal or CVA tenderness.  Vasc : Extremities warm and well perfused. 2+ radial and DP pulses. cap refill <2 seconds  Neuro : Alert and oriented, CNII-XII grossly intact, no focal deficits, no motor or sensory deficits.  Skin : Skin normal color for race, warm, dry and intact. No evidence of rash.  Psych : Alert and oriented to person, place, time/situation. normal mood and affect. no apparent risk to self or others.

## 2023-08-22 NOTE — ED PROVIDER NOTE - PROGRESS NOTE DETAILS
elian -   wbc count 16.04 . mild transaminitis - alk phos 262 / AST 95 / . TBili 1.7. lipase 1479.   ecg nonischemic. trop 7.   US - choledocholithiasis, w dilation. gallbladder sludge.   CT - "Choledocholithiasis with associated mild intrahepatic and extrahepatic biliary ductal dilation."    results as above. given dose of zosyn. vitals stable. pain improved after gi meds and morphine in ED  surg consulted. pending eval / dispo.   signed out to day team pending surg recs. Director - pt received from night team pending surg eval.  Pt noted by surg to be tachy on their eval.  Rpt temp and ekg done.  Sinus tach on ekg.  GI consulted and will eval.  Pt accepted by surg for admit.

## 2023-08-22 NOTE — CONSULT NOTE ADULT - SUBJECTIVE AND OBJECTIVE BOX
HPI:  89 year old female with PMH of HTN, gout, presenting to the ED with constant dull RUQ pain which started as epigastric pain since 8pm last evening associated with nasuea and non bloody emesis. She denies any previous episodes to this before. She report having a BM last night, but has had no BM or flatus since. She denies any current nausea or pain.  CT AP revealed choledocholithiasis with associated mild intrahepatic and extrahepatic biliary ductal dilation. This could represent proteineous/hemorrhagic cyst however tumor cannot be total excluded. RUQ US showing Choledocholithiasis with associated mild intrahepatic and extrahepatic biliary duct dilation, as well as sludge within a distended gallbladder.      Admitted for obstructive gallstone pancreatitis vs choledocholithiases vs acute cholecystis.   GI consulted for possible EGD.   Cardiology consulted for cardiac clearance for EGD/possible cholecystectomy.     ROS: Per HPI    PAST MEDICAL & SURGICAL HISTORY:  HTN (hypertension)      Gout      Cataract  L eye, s/p surgery      Blindness of right eye      Heart murmur      Fracture of left ankle  s/p surgical repair      S/P cataract surgery        SOCIAL HISTORY:  FAMILY HISTORY:  No pertinent family history in first degree relatives      ALLERGIES: 	  No Known Allergies          MEDICATIONS:      T(C): 36.9 (08-22-23 @ 05:30), Max: 36.9 (08-22-23 @ 05:30)  HR: 100 (08-22-23 @ 07:11) (80 - 100)  BP: 112/57 (08-22-23 @ 07:11) (112/57 - 165/76)  RR: 18 (08-22-23 @ 07:11) (18 - 20)  SpO2: 95% (08-22-23 @ 07:11) (95% - 97%)      PHYSICAL EXAM:            I&O's Summary    Height (cm): 157.5 (08-22 @ 01:02)  Weight (kg): 87.1 (08-22 @ 01:02)  BMI (kg/m2): 35.1 (08-22 @ 01:02)  BSA (m2): 1.88 (08-22 @ 01:02)  LABS:	 	                        11.9   16.04 )-----------( 424      ( 22 Aug 2023 03:29 )             38.4     08-22    137  |  103  |  17  ----------------------------<  221<H>  3.7   |  21<L>  |  0.90    Ca    9.4      22 Aug 2023 03:29    TPro  8.2  /  Alb  3.5  /  TBili  1.7<H>  /  DBili  1.2<H>  /  AST  95<H>  /  ALT  134<H>  /  AlkPhos  262<H>  08-22        proBNP:   Lipid Profile:   HgA1c:   TSH:     TELEMETRY: 	    ECG:  	  RADIOLOGY:   ECHO:  STRESS:  CATH:   HPI:  89 year old female with PMH of HTN, gout, presenting to the ED with constant dull RUQ pain which started as epigastric pain since 8pm last evening associated with nasuea and non bloody emesis. She denies any previous episodes to this before. She report having a BM last night, but has had no BM or flatus since. She denies any current nausea or pain.  Afebrile, /57, .   WBC 16, hgb 11.9, Na 137, K 3.7, cr 0.9, t bili 1.7, D bili 1.2, alk phos 262, ast 95, alt 134, troponin 7, lipase 1479.   CXR unremarkable   EKG NSR, qtc 470  CT AP revealed choledocholithiasis with associated mild intrahepatic and extrahepatic biliary ductal dilation. This could represent proteineous/hemorrhagic cyst however tumor cannot be total excluded. RUQ US showing Choledocholithiasis with associated mild intrahepatic and extrahepatic biliary duct dilation, as well as sludge within a distended gallbladder.   Admitted for obstructive gallstone pancreatitis vs choledocholithiases vs acute cholecystis.   GI consulted for EGD.   Cardiology consulted for cardiac clearance for EGD/possible cholecystectomy.     ROS: Per HPI    PAST MEDICAL & SURGICAL HISTORY:  HTN (hypertension)      Gout      Cataract  L eye, s/p surgery      Blindness of right eye      Heart murmur      Fracture of left ankle  s/p surgical repair      S/P cataract surgery        SOCIAL HISTORY:  FAMILY HISTORY:  No pertinent family history in first degree relatives      ALLERGIES: 	  No Known Allergies          MEDICATIONS:      T(C): 36.9 (08-22-23 @ 05:30), Max: 36.9 (08-22-23 @ 05:30)  HR: 100 (08-22-23 @ 07:11) (80 - 100)  BP: 112/57 (08-22-23 @ 07:11) (112/57 - 165/76)  RR: 18 (08-22-23 @ 07:11) (18 - 20)  SpO2: 95% (08-22-23 @ 07:11) (95% - 97%)      PHYSICAL EXAM:            I&O's Summary    Height (cm): 157.5 (08-22 @ 01:02)  Weight (kg): 87.1 (08-22 @ 01:02)  BMI (kg/m2): 35.1 (08-22 @ 01:02)  BSA (m2): 1.88 (08-22 @ 01:02)  LABS:	 	                        11.9   16.04 )-----------( 424      ( 22 Aug 2023 03:29 )             38.4     08-22    137  |  103  |  17  ----------------------------<  221<H>  3.7   |  21<L>  |  0.90    Ca    9.4      22 Aug 2023 03:29    TPro  8.2  /  Alb  3.5  /  TBili  1.7<H>  /  DBili  1.2<H>  /  AST  95<H>  /  ALT  134<H>  /  AlkPhos  262<H>  08-22        proBNP:   Lipid Profile:   HgA1c:   TSH:     TELEMETRY: 	    ECG:  	  RADIOLOGY:   ECHO:  STRESS:  CATH:   HPI:  89 year old female with PMH of HTN, gout, presenting to the ED with constant dull RUQ pain which started as epigastric pain since 8pm last evening associated with nasuea and non bloody emesis. She denies any previous episodes to this before. She report having a BM last night, but has had no BM or flatus since. She denies any current nausea or pain.  Afebrile, /57, .   WBC 16, hgb 11.9, Na 137, K 3.7, cr 0.9, t bili 1.7, D bili 1.2, alk phos 262, ast 95, alt 134, troponin 7, lipase 1479.   CXR unremarkable   EKG NSR, qtc 470  CT AP revealed choledocholithiasis with associated mild intrahepatic and extrahepatic biliary ductal dilation. This could represent proteineous/hemorrhagic cyst however tumor cannot be total excluded. RUQ US showing Choledocholithiasis with associated mild intrahepatic and extrahepatic biliary duct dilation, as well as sludge within a distended gallbladder.   Admitted for obstructive gallstone pancreatitis vs choledocholithiases vs acute cholecystis.   GI consulted for EGD.   Cardiology consulted for cardiac clearance for EGD/possible cholecystectomy.     ROS: Per HPI    PAST MEDICAL & SURGICAL HISTORY:  HTN (hypertension)      Gout      Cataract  L eye, s/p surgery      Blindness of right eye      Heart murmur      Fracture of left ankle  s/p surgical repair      S/P cataract surgery        SOCIAL HISTORY:  FAMILY HISTORY:  No pertinent family history in first degree relatives      ALLERGIES: 	  No Known Allergies          MEDICATIONS:      T(C): 36.9 (08-22-23 @ 05:30), Max: 36.9 (08-22-23 @ 05:30)  HR: 100 (08-22-23 @ 07:11) (80 - 100)  BP: 112/57 (08-22-23 @ 07:11) (112/57 - 165/76)  RR: 18 (08-22-23 @ 07:11) (18 - 20)  SpO2: 95% (08-22-23 @ 07:11) (95% - 97%)      PHYSICAL EXAM:            I&O's Summary    Height (cm): 157.5 (08-22 @ 01:02)  Weight (kg): 87.1 (08-22 @ 01:02)  BMI (kg/m2): 35.1 (08-22 @ 01:02)  BSA (m2): 1.88 (08-22 @ 01:02)  LABS:	 	                        11.9   16.04 )-----------( 424      ( 22 Aug 2023 03:29 )             38.4     08-22    137  |  103  |  17  ----------------------------<  221<H>  3.7   |  21<L>  |  0.90    Ca    9.4      22 Aug 2023 03:29    TPro  8.2  /  Alb  3.5  /  TBili  1.7<H>  /  DBili  1.2<H>  /  AST  95<H>  /  ALT  134<H>  /  AlkPhos  262<H>  08-22     HPI:  89 year old female with PMH of HTN, gout, presenting to the ED with constant dull RUQ pain which started as epigastric pain since 8pm last evening associated with nasuea and non bloody emesis. She denies any previous episodes to this before. She report having a BM last night, but has had no BM or flatus since. She denies any current nausea or pain.  Afebrile, /57, .   WBC 16, hgb 11.9, Na 137, K 3.7, cr 0.9, t bili 1.7, D bili 1.2, alk phos 262, ast 95, alt 134, troponin 7, lipase 1479.   CXR unremarkable   EKG NSR, qtc 470  CT AP revealed choledocholithiasis with associated mild intrahepatic and extrahepatic biliary ductal dilation. This could represent proteineous/hemorrhagic cyst however tumor cannot be total excluded. RUQ US showing Choledocholithiasis with associated mild intrahepatic and extrahepatic biliary duct dilation, as well as sludge within a distended gallbladder.   Admitted for obstructive gallstone pancreatitis vs choledocholithiases vs acute cholecystis.   GI consulted for ERCP.   Cardiology consulted for cardiac clearance for ERCP/possible cholecystectomy.   Patient seen and examined at the bedside. Feeling better than she has for the past few days. Denies chest pain or sob.   Follows with Dr. Avila and sometimes has palpitations at night. She underwent a holter monitor for 7 days but did not follow up results. She was not notified of any abnormalities.     ROS: Per HPI    PAST MEDICAL & SURGICAL HISTORY:  HTN (hypertension)      Gout      Cataract  L eye, s/p surgery      Blindness of right eye      Heart murmur      Fracture of left ankle  s/p surgical repair      S/P cataract surgery        SOCIAL HISTORY:  FAMILY HISTORY:  No pertinent family history in first degree relatives      ALLERGIES: 	  No Known Allergies          MEDICATIONS:      T(C): 36.9 (08-22-23 @ 05:30), Max: 36.9 (08-22-23 @ 05:30)  HR: 100 (08-22-23 @ 07:11) (80 - 100)  BP: 112/57 (08-22-23 @ 07:11) (112/57 - 165/76)  RR: 18 (08-22-23 @ 07:11) (18 - 20)  SpO2: 95% (08-22-23 @ 07:11) (95% - 97%)      PHYSICAL EXAM:            I&O's Summary    Height (cm): 157.5 (08-22 @ 01:02)  Weight (kg): 87.1 (08-22 @ 01:02)  BMI (kg/m2): 35.1 (08-22 @ 01:02)  BSA (m2): 1.88 (08-22 @ 01:02)  LABS:	 	                        11.9   16.04 )-----------( 424      ( 22 Aug 2023 03:29 )             38.4     08-22    137  |  103  |  17  ----------------------------<  221<H>  3.7   |  21<L>  |  0.90    Ca    9.4      22 Aug 2023 03:29    TPro  8.2  /  Alb  3.5  /  TBili  1.7<H>  /  DBili  1.2<H>  /  AST  95<H>  /  ALT  134<H>  /  AlkPhos  262<H>  08-22     HPI:  89 year old female with PMH of HTN, gout, presenting to the ED with constant dull RUQ pain which started as epigastric pain since 8pm last evening associated with nasuea and non bloody emesis. She denies any previous episodes to this before. She report having a BM last night, but has had no BM or flatus since. She denies any current nausea or pain.  Afebrile, /57, .   WBC 16, hgb 11.9, Na 137, K 3.7, cr 0.9, t bili 1.7, D bili 1.2, alk phos 262, ast 95, alt 134, troponin 7, lipase 1479.   CXR unremarkable   EKG NSR, qtc 470  CT AP revealed choledocholithiasis with associated mild intrahepatic and extrahepatic biliary ductal dilation. This could represent proteineous/hemorrhagic cyst however tumor cannot be total excluded. RUQ US showing Choledocholithiasis with associated mild intrahepatic and extrahepatic biliary duct dilation, as well as sludge within a distended gallbladder.   Admitted for obstructive gallstone pancreatitis vs choledocholithiases vs acute cholecystis.   GI consulted for ERCP.   Cardiology consulted for cardiac clearance for ERCP/possible cholecystectomy.   Patient seen and examined at the bedside. Feeling better than she has for the past few days. Denies chest pain or sob.   Follows with Dr. Avila and sometimes has palpitations at night. She underwent a holter monitor for 7 days but did not follow up results. She was not notified of any abnormalities.     ROS: Per HPI    PAST MEDICAL & SURGICAL HISTORY:  HTN (hypertension)      Gout      Cataract  L eye, s/p surgery      Blindness of right eye      Heart murmur      Fracture of left ankle  s/p surgical repair      S/P cataract surgery        SOCIAL HISTORY:  FAMILY HISTORY:  No pertinent family history in first degree relatives      ALLERGIES: 	  No Known Allergies          MEDICATIONS:      T(C): 36.9 (08-22-23 @ 05:30), Max: 36.9 (08-22-23 @ 05:30)  HR: 100 (08-22-23 @ 07:11) (80 - 100)  BP: 112/57 (08-22-23 @ 07:11) (112/57 - 165/76)  RR: 18 (08-22-23 @ 07:11) (18 - 20)  SpO2: 95% (08-22-23 @ 07:11) (95% - 97%)      PHYSICAL EXAM:  pleasant elderly female, NAD   rrr  ctab  +ruq tenderness, soft  no le edema, wwp      I&O's Summary    Height (cm): 157.5 (08-22 @ 01:02)  Weight (kg): 87.1 (08-22 @ 01:02)  BMI (kg/m2): 35.1 (08-22 @ 01:02)  BSA (m2): 1.88 (08-22 @ 01:02)  LABS:	 	                        11.9   16.04 )-----------( 424      ( 22 Aug 2023 03:29 )             38.4     08-22    137  |  103  |  17  ----------------------------<  221<H>  3.7   |  21<L>  |  0.90    Ca    9.4      22 Aug 2023 03:29    TPro  8.2  /  Alb  3.5  /  TBili  1.7<H>  /  DBili  1.2<H>  /  AST  95<H>  /  ALT  134<H>  /  AlkPhos  262<H>  08-22

## 2023-08-22 NOTE — H&P ADULT - NSICDXPASTMEDICALHX_GEN_ALL_CORE_FT
PAST MEDICAL HISTORY:  Blindness of right eye     Cataract L eye, s/p surgery    Gout     Heart murmur     HTN (hypertension)

## 2023-08-22 NOTE — H&P ADULT - ASSESSMENT
89 year old female with PMH of HTN, gout, presenting to the ED with constant dull RUQ pain, LABS significant for WBC 16.04, Bili total 1.7, Alk 262, AST/ALT 95/134, Lipase 1479. CT abd/Pelvis showed distented gallbladder with sludge, no pericholecyustic fluid, mild intrahepatic ductal dilation in both lobes, dilated common bile duct 2.1 cm, multiple hyperdense rounded structures most likely stones within CBD. RUQ US showed mild intra and extra heaptic biliary ductal dilation, CBD measuring 1.4cm, 1.5 cm shadowing rounded echogenic avascular structure, mild gallbladder thickening (0.4cm) with sluge, no cholethiasis, no pericholecystic fluid, unable to access valdes sign as patient had analgesia. Admitted for obstructive gallstone pancreatitis vs choledocholithiases vs acute cholecystis.     Plan:  GI consult for ERCP  Cards consult for clearance for ERCP  NPO/IVF  Zosyn

## 2023-08-22 NOTE — H&P ADULT - HISTORY OF PRESENT ILLNESS
89 year old female with PMH of HTN, gout, presenting to the ED with constant dull RUQ pain which started as epigastric pain since 8pm last evening associated with nasuea and non bloody emesis. She denies any previous episodes to this before. She report having a BM last night, but has had no BM or flatus since. She denies any current nausea or pain.       89 year old female with PMH of HTN, gout, presenting to the ED with constant dull RUQ pain which started as epigastric pain since 8pm last evening associated with nausea and non bloody emesis along with chills. She denies any previous episodes to this before. She report having a BM last night, but has had no BM or flatus since. She denies any current nausea or pain.       89 year old female with PMH of HTN, gout, presenting to the ED with constant dull RUQ pain which started as epigastric pain since 8pm last evening associated with nausea and non bloody emesis along with chills. She denies any previous episodes to this before. She report having a BM last night, but has had no BM or flatus since. Patient reports she tried Alk-[place] and Pep bismol with no relief. She denies any current nausea or pain.

## 2023-08-22 NOTE — PATIENT PROFILE ADULT - FUNCTIONAL ASSESSMENT - DAILY ACTIVITY 5.
Constitutional: positive for fatigue, positive for subjective fever, positive for chills, negative for decreased appetite.  Skin: negative for rashes, negative for open wounds, negative for jaundice.   Eyes: negative for blurry vision, negative for double vision.   Ears, nose, throat: negative for ear pain, positive for nasal congestion, negative for sore throat, negative for lymph node swelling.   Cardiovascular: negative for chest pain, negative for palpitations, negative for lower extremity swelling.   Respiratory: negative for shortness of breath, negative for wheezing, positive for cough.   Gastrointestinal: negative for abdominal pain, negative for nausea, positive for vomiting, negative for diarrhea, negative for constipation, negative for blood in the stool, negative for black tarry stools, positive for abdominal distension   Genitourinary: negative for burning on urination, negative for urinary urgency or frequency, negative for blood in the urine.   Endocrine: negative for cold intolerance, negative for heat intolerance, negative for increased thirst.   Hematologic: negative for easy bruising or bleeding.   Musculoskeletal: negative for muscle/joint pain, negative for decreased range of motion.   Neurological: negative for dizziness, negative for headaches, negative for loss of consciousness, negative for motor weakness, negative for sensory deficits.   Psychiatric: negative for depression, negative for anxiety.
3 = A little assistance

## 2023-08-22 NOTE — PATIENT PROFILE ADULT - FALL HARM RISK - RISK INTERVENTIONS

## 2023-08-22 NOTE — ED PROVIDER NOTE - NS ED ATTENDING STATEMENT MOD
This was a shared visit with the KARLA. I reviewed and verified the documentation and independently performed the documented:

## 2023-08-22 NOTE — CONSULT NOTE ADULT - ASSESSMENT
89 year old female with PMH of HTN, gout, presenting to the ED with 1 day of constant dull RUQ pain. Admitted for obstructive gallstone pancreatitis vs choledocholithiasis vs acute cholecystis.     Neuro: tylenol for pain, zofran for nausea  CV: MAP >65, holding metoprolol & amlodipine   Pulm: satting well on RA  GI: NPO/LR @ 110, ERCP today  : voids  ID: CTX (8/22- ), flagyl (8/22- ), zosyn (8/22-8/22)  Endo: ISS  Heme/PPx: holding SQH pending ERCP, SCDs  Lines: PIVs  PT/OT: not ordered  Dispo: SICU

## 2023-08-22 NOTE — H&P ADULT - NSHPSOCIALHISTORY_GEN_ALL_CORE
lives at home, has a medical aid during the day, nephew comes to stay with her at night, she uses a walker/cane, she can cook for herself, she has no oxygen needs at home, she does not take the stairs at home

## 2023-08-22 NOTE — ED PROVIDER NOTE - CLINICAL SUMMARY MEDICAL DECISION MAKING FREE TEXT BOX
history of htn, gout, no hx abd surgeries. here w upper abd pain into back since 8-9pm tonight. +nausea and vomiting. no fever, chest pain  pt appears uncomfortable but nontoxic, stable vitals, +epigastric and RUQ tenderness.   ddx includes gerd, gastritis, cholelithiasis, cholecystitis, pancreatitis.   less likely cardiac etiology  do not suspect dissection.   plan - labs, ecg  cxr, ruq us  analgesia / antiemetics prn  serial abd exams

## 2023-08-22 NOTE — CHART NOTE - NSCHARTNOTEFT_GEN_A_CORE
ERCP performed by Dr. Burch. Findings as noted:    -Food debris was seen in the stomach.    -The major papilla was identified and appeared to be slightly bulging.   -Duct was moderately dilated with multiple filling defects noted. A small sphincterotomy was performed without complications.   -10 Fr 5 cm straight stent with internal and external flaps was placed into the bile duct    Recommendations:  -ERCP in 4 to 8 weeks for stent management  -Discussion of cholecystectomy as part of mgmt plan per surgical team  -Continue IV abx, consider zosyn with clinical worsening  -Pt should follow up with Dr. Burch as outpatient in 2-3 weeks    GI svc will continue to follow    Jose Gallardo, DO  Gastroenterology Fellow  After 5PM or on weekends, please contact [place]  for fellow on call

## 2023-08-22 NOTE — PRE-ANESTHESIA EVALUATION ADULT - NSRADCARDRESULTSFT_GEN_ALL_CORE
CT Abdomen/Pelvis with IV contrast 8/22/2023  "IMPRESSION:  1.  Choledocholithiasis with associated mild intrahepatic and   extrahepatic biliary ductal dilation.  2.  Since 5/3/2020, slight increase in size of 2.5 cm indeterminate   exophytic left renal cortical exophytic hyperdense lesion. This could   represent proteinaceous/hemorrhagic cyst, however tumor cannot be totally   excluded. Can consider contrast-enhanced renal mass protocol MRI for   further characterization.  3.   No bowel obstruction.  4. Large hiatal hernia."    Right upper quadrant ultrasound 8/22/2023  "IMPRESSION:  1. Choledocholithiasis with associated mild intrahepatic and extrahepatic   biliary ductal dilation.  2. Sludge within a distended gallbladder."    CXR 8/22/2023  "IMPRESSION:    Lungs clear. No infiltrate pleural effusion or pneumothorax. Vascular   calcification thoracic aorta. No acute bone abnormality."    Transthoracic Echocardiogram 5/4/2020    "CONCLUSIONS:     1. Small left ventricular cavity size.   2. Normal left ventricular systolic function.   3. Grade I left ventricular diastolic dysfunction without elevated filling pressure.   4. Moderate symmetric left ventricular hypertrophy.   5. Normal right ventricular size and systolic function.   6. No significant valvular disease.   7. Pulmonary hypertension present, pulmonary artery systolic pressure is 52 mmHg.   8. No pericardial effusion."

## 2023-08-22 NOTE — CONSULT NOTE ADULT - SUBJECTIVE AND OBJECTIVE BOX
89 year old female with PMH of HTN who presented to the ED with constant dull RUQ pain which started as acute epigastric pain beginning in PM on night prior to arrival on 8/22 in AM and associated with nausea and non bloody emesis along with chills. She denies any previous episodes to this before.     Had 1 BM in the evening but minimal flatus since. Tried OTC antacid therapies without relief and decided to seek eval given her symptoms.    At bedside, she recalls feeling 'so sick' and feeling as if she was going to die this AM but significantly improved with IVF and abx.  No abd pain at rest and nausea/vomiting has resolved.     No prior EGD that she recalls  Colonoscopy many years ago    Denies DAPT/anticoag therapy.     (22 Aug 2023 08:06)    Allergies    No Known Allergies    Intolerances      Home Medications:  allopurinol 100 mg oral tablet: 1 tab(s) orally once a day (03 May 2020 03:30)  amLODIPine 5 mg oral tablet: 1 tab(s) orally once a day (03 May 2020 03:30)  metoprolol succinate 50 mg oral tablet, extended release: 1 tab(s) orally once a day (03 May 2020 03:28)    MEDICATIONS:  MEDICATIONS  (STANDING):  cefTRIAXone   IVPB 2000 milliGRAM(s) IV Intermittent every 24 hours  chlorhexidine 4% Liquid 1 Application(s) Topical <User Schedule>  dextrose 5%. 1000 milliLiter(s) (100 mL/Hr) IV Continuous <Continuous>  dextrose 5%. 1000 milliLiter(s) (50 mL/Hr) IV Continuous <Continuous>  dextrose 50% Injectable 25 Gram(s) IV Push once  dextrose 50% Injectable 25 Gram(s) IV Push once  dextrose 50% Injectable 12.5 Gram(s) IV Push once  glucagon  Injectable 1 milliGRAM(s) IntraMuscular once  insulin lispro (ADMELOG) corrective regimen sliding scale   SubCutaneous three times a day before meals  lactated ringers. 1000 milliLiter(s) (110 mL/Hr) IV Continuous <Continuous>  metroNIDAZOLE  IVPB 500 milliGRAM(s) IV Intermittent every 8 hours    MEDICATIONS  (PRN):  acetaminophen   IVPB .. 1000 milliGRAM(s) IV Intermittent once PRN Mild Pain (1 - 3)  dextrose Oral Gel 15 Gram(s) Oral once PRN Blood Glucose LESS THAN 70 milliGRAM(s)/deciliter  HYDROmorphone  Injectable 0.25 milliGRAM(s) IV Push every 3 hours PRN Moderate Pain (4 - 6)  HYDROmorphone  Injectable 0.5 milliGRAM(s) IV Push every 3 hours PRN Severe Pain (7 - 10)  ondansetron Injectable 4 milliGRAM(s) IV Push every 8 hours PRN Nausea and/or Vomiting    PAST MEDICAL & SURGICAL HISTORY:  HTN (hypertension)      Gout      Cataract  L eye, s/p surgery      Blindness of right eye      Heart murmur      Fracture of left ankle  s/p surgical repair      S/P cataract surgery        FAMILY HISTORY:  No pertinent family history in first degree relatives      SOCIAL HISTORY:  Former smoker, quit >30 years ago  No ETOH use    REVIEW OF SYSTEMS:  All other 10 review of systems is negative unless indicated above.    Vital Signs Last 24 Hrs  T(C): 36.5 (22 Aug 2023 09:20), Max: 36.9 (22 Aug 2023 05:30)  T(F): 97.7 (22 Aug 2023 09:20), Max: 98.4 (22 Aug 2023 05:30)  HR: 106 (22 Aug 2023 09:20) (80 - 106)  BP: 122/77 (22 Aug 2023 09:20) (112/57 - 165/76)  BP(mean): 96 (22 Aug 2023 09:20) (96 - 96)  RR: 16 (22 Aug 2023 09:20) (16 - 20)  SpO2: 97% (22 Aug 2023 09:20) (95% - 97%)    Parameters below as of 22 Aug 2023 09:20  Patient On (Oxygen Delivery Method): room air          PHYSICAL EXAM:    General: No acute distress  Eyes: Anicteric sclerae, moist conjunctivae  HENT: Dry mucous membranes  Neck: Trachea midline, supple  Lungs: Normal respiratory effort and no intercostal retractions  Cardiovascular: Tachycardic on tele  Abdomen: Soft, RUQ tenderness with deep palpation, non-distended; No rebound or guarding  Neurological: Alert and oriented x3  Skin: Warm and dry. No obvious rash    LABS:                        11.9   16.04 )-----------( 424      ( 22 Aug 2023 03:29 )             38.4     08-22    137  |  103  |  17  ----------------------------<  221<H>  3.7   |  21<L>  |  0.90    Ca    9.4      22 Aug 2023 03:29    TPro  8.2  /  Alb  3.5  /  TBili  1.7<H>  /  DBili  1.2<H>  /  AST  95<H>  /  ALT  134<H>  /  AlkPhos  262<H>  08-22            RADIOLOGY & ADDITIONAL STUDIES:       ACC: [id number] EXAM:  CT ABDOMEN AND PELVIS IC   ORDERED BY: KAYLEE DUBON     PROCEDURE DATE:  08/22/2023          INTERPRETATION:  CLINICAL INFORMATION: Upper abdominal pain/vomiting.    COMPARISON: Abdominal ultrasound from 8/22/2023. CTA of the coronary   arteries from 5/4/2020. CTA of the chest from 5/3/2020.    CONTRAST/COMPLICATIONS:  IV Contrast: Isovue 370  95 cc administered   5 cc discarded  Oral Contrast: NONE  Complications: None reported at time of study completion    PROCEDURE:  CT of the Abdomen and Pelvis was performed.  Sagittal and coronal reformats were performed.    FINDINGS:  LOWER CHEST: Large hiatal hernia.    LIVER: Within normal limits.  BILE DUCTS: Mild intrahepatic ductal dilation in both lobes. Dilated   common bile duct, measuring 2.1 cm. Within the common bile duct there are   multiple hyperdense rounded structures most likely representing stones.  GALLBLADDER: Distended gallbladder containing sludge. No pericholecystic   fluid.  SPLEEN: Within normal limits.  PANCREAS: Within normal limits.  ADRENALS: Since 5/3/2020, no change in size of 1.6 x 1.5 x 1.6 cm left   adrenal nodule. Given stability for over 3 years this likely represents   an adenoma.  KIDNEYS/URETERS: Increase in size of 2.3 x 2.5 x 2.4 cm indeterminate   exophytic left midpole lesion with higher than simple fluid attenuation   (57 HU). Right renal cysts. Few additional bilateral subcentimeter   hypodense lesions, too small to characterize. Nonobstructing left midpole   0.5 cm stone. No hydronephrosis. 0.9 cm cortical hyperdense nodule upper   pole right kidney is grossly unchanged.    BLADDER: Within normal limits.  REPRODUCTIVE ORGANS: 1.8 x 1.9 x 1.8 cm calcified anterior uterine body   subserosal fibroid in the bilateral adnexa are unremarkable.    BOWEL: Colonic diverticulosis. No bowel obstruction. Appendix is normal.  PERITONEUM: No ascites.  VESSELS: Atherosclerotic changes.  RETROPERITONEUM/LYMPH NODES: No lymphadenopathy.  ABDOMINAL WALL: Moderate fat-containing umbilical hernia.  BONES: Degenerative changes.    IMPRESSION:  1.  Choledocholithiasis with associated mild intrahepatic and   extrahepatic biliary ductal dilation.  2.  Since 5/3/2020, slight increase in size of 2.5 cm indeterminate   exophytic left renal cortical exophytic hyperdense lesion. This could   represent proteinaceous/hemorrhagic cyst, however tumor cannot be totally   excluded. Can consider contrast-enhanced renal mass protocol MRI for   further characterization.  3.   No bowel obstruction.  4. Large hiatal hernia.      --- End of Report ---          ANTONINO HOLCOMB MD; Resident Radiologist  This document has been electronically signed.  HANDY COSME MD; Attending Radiologist  This document has been electronically signed. Aug 22 2023  7:15AM

## 2023-08-22 NOTE — CONSULT NOTE ADULT - CONSULT REASON
obstructive gallstone pancreatitis vs choledocholithiasis vs acute cholecystis
pre-operative clearance
Choledocholithiasis

## 2023-08-22 NOTE — CONSULT NOTE ADULT - ASSESSMENT
89 yo F with HTN admitted for acute gallstone pancreatitis c/b choledocholithiasis as with suspected mild acute cholangitis    Choledocholithiasis noted on CT.      Hemodynamically stable and afebrile at bedside. Non toxic appearing.  Responded quite well to resuscitation.  Unclear whether leukocytosis 2/2 cholangitis vs acute pancreatitis, but agree with abx coverage for cholangitis.  Reassuringly not had evidence of organ dysfunction otherwise which is reassuring.      CT reviewed    Recommendations:  -Continue IVF resuscitation for treatment of acute pancreatitis  -Agree with CTX and flagyl.  If febrile/worsening clinical status change to Zosyn  -Please repeat CBC, CMP and check coags at noon  -Will plan for ERCP later this afternoon to allow for further resuscitation presuming continue hemodynamic stability  -Appreciate SICU care and cardiology pre-procedure risk assessment    Jose Gallardo DO  Gastroenterology Fellow  Pager: [phone]  After 5PM or on weekends, please contact [place]  for fellow on call   89 yo F with HTN admitted for acute gallstone pancreatitis c/b choledocholithiasis as with suspected mild acute cholangitis    Choledocholithiasis noted on CT.      Hemodynamically stable and afebrile at bedside. Non toxic appearing.  Responded quite well to resuscitation.  Unclear whether leukocytosis 2/2 cholangitis vs acute pancreatitis, but agree with abx coverage for cholangitis.  Reassuringly not had evidence of organ dysfunction otherwise which is reassuring.      CT reviewed    Recommendations:  -Continue IVF resuscitation for treatment of acute pancreatitis  -Agree with CTX and flagyl.  If febrile/worsening clinical status change to Zosyn  -Please repeat CBC, CMP and check coags at noon  -Will plan for ERCP later this afternoon to allow for further resuscitation presuming continued hemodynamic stability  -Appreciate SICU care and cardiology pre-procedure risk assessment    Jose Gallardo DO  Gastroenterology Fellow  Pager: [phone]  After 5PM or on weekends, please contact [place]  for fellow on call   89 yo F with HTN admitted for acute gallstone pancreatitis c/b choledocholithiasis as with suspected mild acute cholangitis    Choledocholithiasis noted on CT.      Hemodynamically stable and afebrile at bedside. Non toxic appearing.  Responded quite well to resuscitation.  Unclear whether leukocytosis 2/2 cholangitis vs acute pancreatitis, but agree with abx coverage for cholangitis.  Reassuringly not had evidence of organ dysfunction otherwise which is reassuring.      CT reviewed    Recommendations:  -Continue IVF resuscitation for treatment of acute pancreatitis  -Agree with CTX and flagyl.  If febrile/worsening clinical status change to Zosyn  -Please repeat CBC, CMP and check coags at noon  -Will plan for ERCP later this afternoon to allow for further resuscitation presuming continued hemodynamic stability  -Appreciate SICU care and cardiology pre-procedure risk assessment    **update 0140**   Repeat labs noting uptrending WBCs and liver enzymes/bili.  Consideration of abx change to IV zosyn discussed with SICU team.      Jose Gallardo DO  Gastroenterology Fellow  Pager: [phone]  After 5PM or on weekends, please contact [place]  for fellow on call

## 2023-08-22 NOTE — PRE-ANESTHESIA EVALUATION ADULT - NSANTHPMHFT_GEN_ALL_CORE
Cardiac: Positive for HTN, pulmonary hypertension PASP 52 mm hg, LVH, Grade 1 diastolic dysfunction. Denies MI/Angina/heart failure, Arrhythmia, Murmur/Valvular disorder  Pulmonary: Denies Asthma, COPD, NUBIA  Renal: Denies kidney dysfunction  Hepatic: Positive for gallstone pancreatitis  Gastrointestinal: Denies GERD/IBS  Endocrine: Denies DM or thyroid dysfunction  Neurologic: Denies stroke/seizure disorder  Hematologic: Denies anemia, blood clotting disorder, blood thinning medication.   Ophthalmologic: Blindness of right eye  Metabolic: Positive for gout.     PSH: Cataract surgery, left ankle fracture ORIF.

## 2023-08-22 NOTE — PRE-ANESTHESIA EVALUATION ADULT - NSANTHOSAYNRD_GEN_A_CORE
No. NUBIA screening performed.  STOP BANG Legend: 0-2 = LOW Risk; 3-4 = INTERMEDIATE Risk; 5-8 = HIGH Risk

## 2023-08-22 NOTE — CONSULT NOTE ADULT - ASSESSMENT
EKG 8/22/2023: NSR, 1st degree av block, qtc 470  TTE 5/2020: 1. Small left ventricular cavity size.2. Normal left ventricular systolic function.3. Grade I left ventricular diastolic dysfunction without elevated filling pressure.  4. Moderate symmetric left ventricular hypertrophy 5. Normal right ventricular size and systolic function. 6. No significant valvular disease. 7. Pulmonary hypertension present, pulmonary artery systolic pressure is 52 mmHg. 8. No pericardial effusion.   89 year old female with PMH of HTN admitted for gallstone pancreatitis. Cardiology consulted for preoperative risk assessment.     EKG 8/22/2023: NSR, 1st degree av block, qtc 470  TTE 5/2020: 1. Small left ventricular cavity size.2. Normal left ventricular systolic function.3. Grade I left ventricular diastolic dysfunction without elevated filling pressure.  4. Moderate symmetric left ventricular hypertrophy 5. Normal right ventricular size and systolic function. 6. No significant valvular disease. 7. Pulmonary hypertension present, pulmonary artery systolic pressure is 52 mmHg. 8. No pericardial effusion.   89 year old female with PMH of HTN and nonobstructive CAD who presented with RUQ abdominal pain. Afebrile with leukocytosis, elevated lipase, lfts, and t bili. CTAP  choledocholithiasis with associated mild intrahepatic and extrahepatic biliary ductal dilation concerning for obstructive gallstone pancreatitis vs choledocholithiases vs acute cholecystis. Planned for ERCP and possible cholecystectomy. Cardiology consulted for preoperative risk assessment.     EKG 8/22/2023: NSR, 1st degree av block, qtc 470  TTE 5/2020: 1. Small left ventricular cavity size.2. Normal left ventricular systolic function.3. Grade I left ventricular diastolic dysfunction without elevated filling pressure.  4. Moderate symmetric left ventricular hypertrophy 5. Normal right ventricular size and systolic function. 6. No significant valvular disease. 7. Pulmonary hypertension present, pulmonary artery systolic pressure is 52 mmHg. 8. No pericardial effusion.  CCTA 2020:  The calcium score is mild at 71  Agatston units. Non-obstructive coronary artery disease.Dilated main PA, measuring 3.8cm x 3.6cm.  Enlarged RV with flattening of the interventricular septum. The calculated LVEF is 75% with normal wall motion and wall thickness.  NST 2018: nl    #Pre-operative cardiovascular risk assessment  Patient without active ACS, unstable arrythmia or decompensated heart failure   CCTA 2020 without obstructive disease   No cardiovascular contraindication to proceeding with procedure   Patient is low risk for low risk (ERCP) to intermediate risk (cholecystectomy) procedure   - continue patient's home Toprol 50mg qd   - ok to hold home amlodipine 5mg given normotensive, if persistently hypertensive can restart     Case d/w Dr. Dilcia Arzate  Cardiology fellow

## 2023-08-22 NOTE — CONSULT NOTE ADULT - ATTENDING COMMENTS
HTN, gout, now with obstructing gallstone with choledocholithiasis  physical as above  empiric ceftriaxone and flagyl  ERCP today  hold metoprolol and amlodipine for now  SICU monitoring  SCDs only for now  decision making of high complexity
Pt seen and examined, d/w fellow.  Cholangitis and gallstone pancreatitis.  S/p ERCP with small sphincterotomy and stent placement.  Continue antibiotics, monitor labs.  Clear liquid diet.  F/u as outpt for completion ERCP.
Patient is a 89 year old female with PMH of HTN and nonobstructive CAD who presented with RUQ abdominal pain found to have choledocholithiasis with associated mild intrahepatic and extrahepatic biliary ductal dilation concerning for obstructive gallstone pancreatitis vs choledocholithiases, Planned for ERCP and possible cholecystectomy. Cardiology consulted for preoperative risk assessment.     REVIEW OF STUDIES  - EKG 8/22/2023: NSR, 1st degree av block, qtc 470  - TTE 5/2020: Small left ventricular cavity size. Normal left ventricular systolic function. Grade I left ventricular diastolic dysfunction without elevated filling pressure. Moderate symmetric left ventricular hypertrophy. Normal right ventricular size and systolic function.  No significant valvular disease. Pulmonary hypertension present, pulmonary artery systolic pressure is 52 mmHg.   - CCTA 2020:  The calcium score is mild at 71  Agatston units. Non-obstructive coronary artery disease.Dilated main PA, measuring 3.8cm x 3.6cm.  Enlarged RV with flattening of the interventricular septum. The calculated LVEF is 75% with normal wall motion and wall thickness.  - NST 2018: Normal Biventricular function    #Pre-operative cardiovascular risk assessment  - Patient lives independently and is able to maintain her ADLS without significant exertion.   - Ischemic workup performed in 2020 in the setting of chest discomfort revealed NOCAD and last NST in 2018 was unremarkable  - CLinically she is in NAD, without chest pain, palpitations or other anginal equivalents.   - There are no active CV contraindication to non elective ERCP. She is at low-Intermediate risk for low risk (ERCP) to intermediate risk (cholecystectomy) procedure   - Recommend continuing patient's home Toprol 50mg po qd   - Can hold home amlodipine 5mg given need for volume resuscitation    Cardiology will continue to follow with you, please call with any questions

## 2023-08-22 NOTE — CONSULT NOTE ADULT - SUBJECTIVE AND OBJECTIVE BOX
HPI:  89 year old female with PMH of HTN, gout, presenting to the ED with constant dull RUQ pain which started as epigastric pain since 8pm last evening associated with nausea and non bloody emesis along with chills. She denies any previous episodes to this before. She report having a BM last night, but has had no BM or flatus since. Patient reports she tried Alk-[place] and Pep bismol with no relief. She denies any current nausea or pain.      SICU ADDENDUM:    PMH: HTN, gout, heart murmur, right retinal distachment     PSH: L cataract repair, left ankle fracture and repair     MEDS: metoprolol 50mg, amlodpine 5mg, allopurinol 100mg    Allergies: No Known Allergies      Social- lives at home alone with nephew who comes at night, aid during the day, uses walker/cane, no oxygen use at home       ICU Vital Signs Last 24 Hrs  T(F): 97.7 (08-22-23 @ 09:20), Max: 98.4 (08-22-23 @ 05:30)  HR: 106 (08-22-23 @ 09:20) (80 - 106)  BP: 122/77 (08-22-23 @ 09:20) (112/57 - 165/76)  BP(mean): 96 (08-22-23 @ 09:20) (96 - 96)  ABP: --  RR: 16 (08-22-23 @ 09:20) (16 - 20)  SpO2: 97% (08-22-23 @ 09:20) (95% - 97%)          LABS:    08-22    137  |  103  |  17  ----------------------------<  221<H>  3.7   |  21<L>  |  0.90    Ca    9.4      22 Aug 2023 03:29    TPro  8.2  /  Alb  3.5  /  TBili  1.7<H>  /  DBili  1.2<H>  /  AST  95<H>  /  ALT  134<H>  /  AlkPhos  262<H>  08-22  LIVER FUNCTIONS - ( 22 Aug 2023 03:29 )  Alb: 3.5 g/dL / Pro: 8.2 g/dL / ALK PHOS: 262 U/L / ALT: 134 U/L / AST: 95 U/L / GGT: x                               11.9   16.04 )-----------( 424      ( 22 Aug 2023 03:29 )             38.4     Urinalysis Basic - ( 22 Aug 2023 03:29 )    Color: x / Appearance: x / SG: x / pH: x  Gluc: 221 mg/dL / Ketone: x  / Bili: x / Urobili: x   Blood: x / Protein: x / Nitrite: x   Leuk Esterase: x / RBC: x / WBC x   Sq Epi: x / Non Sq Epi: x / Bacteria: x    CAPILLARY BLOOD GLUCOSE             HPI:  89 year old female with PMH of HTN, gout, presenting to the ED with constant dull RUQ pain which started as epigastric pain since 8pm last evening associated with nausea and non bloody emesis along with chills. She denies any previous episodes to this before. She report having a BM last night, but has had no BM or flatus since. Patient reports she tried Alk-[place] and Pep bismol with no relief. She denies any current nausea or pain.      SICU ADDENDUM:    PMH: HTN, gout, heart murmur, right retinal distachment     PSH: L cataract repair, left ankle fracture and repair     MEDS: metoprolol 50mg, amlodpine 5mg, allopurinol 100mg    Allergies: No Known Allergies      Social- lives at home alone with nephew who comes at night, aid during the day, uses walker/cane, no oxygen use at home       ICU Vital Signs Last 24 Hrs  T(F): 97.7 (08-22-23 @ 09:20), Max: 98.4 (08-22-23 @ 05:30)  HR: 106 (08-22-23 @ 09:20) (80 - 106)  BP: 122/77 (08-22-23 @ 09:20) (112/57 - 165/76)  BP(mean): 96 (08-22-23 @ 09:20) (96 - 96)  ABP: --  RR: 16 (08-22-23 @ 09:20) (16 - 20)  SpO2: 97% (08-22-23 @ 09:20) (95% - 97%)    General: NAD, resting comfortably in bed. Well developed, well groomed  NEURO: AAOx3, CN II-XII grossly intact, EOMI, follows commands  HEENT: PERRL, MMM  CV: NSR, RRR  PULM: CTAB, nonlabored breathing, no respiratory distress  ABD: soft, NT/ND. No rebound, no guarding, no tympany.   : normal genitalia  EXTREM: WWP, no edema, no calf tenderness  VASC: No cyanosis, pallor. Palpable radial and PT bilaterally  SKIN: No rashes noted  PSYCH: Appropriate affect, answers questions appropriately        LABS:    08-22    137  |  103  |  17  ----------------------------<  221<H>  3.7   |  21<L>  |  0.90    Ca    9.4      22 Aug 2023 03:29    TPro  8.2  /  Alb  3.5  /  TBili  1.7<H>  /  DBili  1.2<H>  /  AST  95<H>  /  ALT  134<H>  /  AlkPhos  262<H>  08-22  LIVER FUNCTIONS - ( 22 Aug 2023 03:29 )  Alb: 3.5 g/dL / Pro: 8.2 g/dL / ALK PHOS: 262 U/L / ALT: 134 U/L / AST: 95 U/L / GGT: x                               11.9   16.04 )-----------( 424      ( 22 Aug 2023 03:29 )             38.4     Urinalysis Basic - ( 22 Aug 2023 03:29 )    Color: x / Appearance: x / SG: x / pH: x  Gluc: 221 mg/dL / Ketone: x  / Bili: x / Urobili: x   Blood: x / Protein: x / Nitrite: x   Leuk Esterase: x / RBC: x / WBC x   Sq Epi: x / Non Sq Epi: x / Bacteria: x    CAPILLARY BLOOD GLUCOSE

## 2023-08-23 LAB
A1C WITH ESTIMATED AVERAGE GLUCOSE RESULT: 5.8 % — HIGH (ref 4–5.6)
ALBUMIN SERPL ELPH-MCNC: 2.9 G/DL — LOW (ref 3.3–5)
ALBUMIN SERPL ELPH-MCNC: 3 G/DL — LOW (ref 3.3–5)
ALP SERPL-CCNC: 213 U/L — HIGH (ref 40–120)
ALP SERPL-CCNC: 221 U/L — HIGH (ref 40–120)
ALT FLD-CCNC: 132 U/L — HIGH (ref 10–45)
ALT FLD-CCNC: 141 U/L — HIGH (ref 10–45)
ANION GAP SERPL CALC-SCNC: 11 MMOL/L — SIGNIFICANT CHANGE UP (ref 5–17)
ANION GAP SERPL CALC-SCNC: 9 MMOL/L — SIGNIFICANT CHANGE UP (ref 5–17)
AST SERPL-CCNC: 108 U/L — HIGH (ref 10–40)
AST SERPL-CCNC: 125 U/L — HIGH (ref 10–40)
BILIRUB DIRECT SERPL-MCNC: 2 MG/DL — HIGH (ref 0–0.3)
BILIRUB DIRECT SERPL-MCNC: 3.2 MG/DL — HIGH (ref 0–0.3)
BILIRUB INDIRECT FLD-MCNC: 0.3 MG/DL — SIGNIFICANT CHANGE UP (ref 0.2–1)
BILIRUB INDIRECT FLD-MCNC: 0.4 MG/DL — SIGNIFICANT CHANGE UP (ref 0.2–1)
BILIRUB SERPL-MCNC: 2.4 MG/DL — HIGH (ref 0.2–1.2)
BILIRUB SERPL-MCNC: 3.5 MG/DL — HIGH (ref 0.2–1.2)
BUN SERPL-MCNC: 5 MG/DL — LOW (ref 7–23)
BUN SERPL-MCNC: 8 MG/DL — SIGNIFICANT CHANGE UP (ref 7–23)
CALCIUM SERPL-MCNC: 8.1 MG/DL — LOW (ref 8.4–10.5)
CALCIUM SERPL-MCNC: 8.7 MG/DL — SIGNIFICANT CHANGE UP (ref 8.4–10.5)
CHLORIDE SERPL-SCNC: 101 MMOL/L — SIGNIFICANT CHANGE UP (ref 96–108)
CHLORIDE SERPL-SCNC: 104 MMOL/L — SIGNIFICANT CHANGE UP (ref 96–108)
CO2 SERPL-SCNC: 20 MMOL/L — LOW (ref 22–31)
CO2 SERPL-SCNC: 23 MMOL/L — SIGNIFICANT CHANGE UP (ref 22–31)
CREAT SERPL-MCNC: 0.74 MG/DL — SIGNIFICANT CHANGE UP (ref 0.5–1.3)
CREAT SERPL-MCNC: 0.77 MG/DL — SIGNIFICANT CHANGE UP (ref 0.5–1.3)
EGFR: 72 ML/MIN/1.73M2 — SIGNIFICANT CHANGE UP
EGFR: 75 ML/MIN/1.73M2 — SIGNIFICANT CHANGE UP
ESTIMATED AVERAGE GLUCOSE: 120 MG/DL — HIGH (ref 68–114)
GLUCOSE BLDC GLUCOMTR-MCNC: 114 MG/DL — HIGH (ref 70–99)
GLUCOSE BLDC GLUCOMTR-MCNC: 121 MG/DL — HIGH (ref 70–99)
GLUCOSE BLDC GLUCOMTR-MCNC: 211 MG/DL — HIGH (ref 70–99)
GLUCOSE SERPL-MCNC: 133 MG/DL — HIGH (ref 70–99)
GLUCOSE SERPL-MCNC: 180 MG/DL — HIGH (ref 70–99)
HCT VFR BLD CALC: 32.1 % — LOW (ref 34.5–45)
HGB BLD-MCNC: 10.1 G/DL — LOW (ref 11.5–15.5)
MAGNESIUM SERPL-MCNC: 1.9 MG/DL — SIGNIFICANT CHANGE UP (ref 1.6–2.6)
MCHC RBC-ENTMCNC: 24.2 PG — LOW (ref 27–34)
MCHC RBC-ENTMCNC: 31.5 GM/DL — LOW (ref 32–36)
MCV RBC AUTO: 77 FL — LOW (ref 80–100)
NRBC # BLD: 0 /100 WBCS — SIGNIFICANT CHANGE UP (ref 0–0)
PHOSPHATE SERPL-MCNC: 2.8 MG/DL — SIGNIFICANT CHANGE UP (ref 2.5–4.5)
PLATELET # BLD AUTO: 357 K/UL — SIGNIFICANT CHANGE UP (ref 150–400)
POTASSIUM SERPL-MCNC: 3.9 MMOL/L — SIGNIFICANT CHANGE UP (ref 3.5–5.3)
POTASSIUM SERPL-MCNC: 4 MMOL/L — SIGNIFICANT CHANGE UP (ref 3.5–5.3)
POTASSIUM SERPL-SCNC: 3.9 MMOL/L — SIGNIFICANT CHANGE UP (ref 3.5–5.3)
POTASSIUM SERPL-SCNC: 4 MMOL/L — SIGNIFICANT CHANGE UP (ref 3.5–5.3)
PROT SERPL-MCNC: 6.6 G/DL — SIGNIFICANT CHANGE UP (ref 6–8.3)
PROT SERPL-MCNC: 6.6 G/DL — SIGNIFICANT CHANGE UP (ref 6–8.3)
RBC # BLD: 4.17 M/UL — SIGNIFICANT CHANGE UP (ref 3.8–5.2)
RBC # FLD: 21.9 % — HIGH (ref 10.3–14.5)
SODIUM SERPL-SCNC: 132 MMOL/L — LOW (ref 135–145)
SODIUM SERPL-SCNC: 136 MMOL/L — SIGNIFICANT CHANGE UP (ref 135–145)
WBC # BLD: 13.87 K/UL — HIGH (ref 3.8–10.5)
WBC # FLD AUTO: 13.87 K/UL — HIGH (ref 3.8–10.5)

## 2023-08-23 PROCEDURE — 99232 SBSQ HOSP IP/OBS MODERATE 35: CPT | Mod: GC

## 2023-08-23 PROCEDURE — 99232 SBSQ HOSP IP/OBS MODERATE 35: CPT

## 2023-08-23 RX ORDER — LABETALOL HCL 100 MG
10 TABLET ORAL ONCE
Refills: 0 | Status: COMPLETED | OUTPATIENT
Start: 2023-08-23 | End: 2023-08-23

## 2023-08-23 RX ORDER — AMLODIPINE BESYLATE 2.5 MG/1
5 TABLET ORAL EVERY 24 HOURS
Refills: 0 | Status: DISCONTINUED | OUTPATIENT
Start: 2023-08-23 | End: 2023-08-25

## 2023-08-23 RX ORDER — ENOXAPARIN SODIUM 100 MG/ML
40 INJECTION SUBCUTANEOUS EVERY 12 HOURS
Refills: 0 | Status: DISCONTINUED | OUTPATIENT
Start: 2023-08-23 | End: 2023-08-25

## 2023-08-23 RX ORDER — METOPROLOL TARTRATE 50 MG
50 TABLET ORAL EVERY 24 HOURS
Refills: 0 | Status: DISCONTINUED | OUTPATIENT
Start: 2023-08-23 | End: 2023-08-25

## 2023-08-23 RX ORDER — MAGNESIUM SULFATE 500 MG/ML
1 VIAL (ML) INJECTION ONCE
Refills: 0 | Status: COMPLETED | OUTPATIENT
Start: 2023-08-23 | End: 2023-08-23

## 2023-08-23 RX ORDER — HEPARIN SODIUM 5000 [USP'U]/ML
7500 INJECTION INTRAVENOUS; SUBCUTANEOUS ONCE
Refills: 0 | Status: COMPLETED | OUTPATIENT
Start: 2023-08-23 | End: 2023-08-23

## 2023-08-23 RX ADMIN — Medication 100 GRAM(S): at 09:21

## 2023-08-23 RX ADMIN — HEPARIN SODIUM 7500 UNIT(S): 5000 INJECTION INTRAVENOUS; SUBCUTANEOUS at 06:36

## 2023-08-23 RX ADMIN — HEPARIN SODIUM 7500 UNIT(S): 5000 INJECTION INTRAVENOUS; SUBCUTANEOUS at 15:09

## 2023-08-23 RX ADMIN — Medication 2: at 12:46

## 2023-08-23 RX ADMIN — CEFTRIAXONE 100 MILLIGRAM(S): 500 INJECTION, POWDER, FOR SOLUTION INTRAMUSCULAR; INTRAVENOUS at 09:21

## 2023-08-23 RX ADMIN — Medication 100 MILLIGRAM(S): at 03:45

## 2023-08-23 RX ADMIN — Medication 50 MILLIGRAM(S): at 12:46

## 2023-08-23 RX ADMIN — Medication 100 MILLIGRAM(S): at 18:13

## 2023-08-23 RX ADMIN — CHLORHEXIDINE GLUCONATE 1 APPLICATION(S): 213 SOLUTION TOPICAL at 05:46

## 2023-08-23 RX ADMIN — AMLODIPINE BESYLATE 5 MILLIGRAM(S): 2.5 TABLET ORAL at 10:45

## 2023-08-23 RX ADMIN — Medication 100 MILLIGRAM(S): at 10:45

## 2023-08-23 RX ADMIN — Medication 10 MILLIGRAM(S): at 18:12

## 2023-08-23 RX ADMIN — ENOXAPARIN SODIUM 40 MILLIGRAM(S): 100 INJECTION SUBCUTANEOUS at 22:20

## 2023-08-23 RX ADMIN — CHLORHEXIDINE GLUCONATE 1 APPLICATION(S): 213 SOLUTION TOPICAL at 12:46

## 2023-08-23 NOTE — PROGRESS NOTE ADULT - SUBJECTIVE AND OBJECTIVE BOX
Cardiology Consult  INCOMPLETE NOTE    Interval History/HPI:   ERCP 8/22:   -Food debris was seen in the stomach.    -The major papilla was identified and appeared to be slightly bulging.   -Duct was moderately dilated with multiple filling defects noted. A small sphincterotomy was performed without complications.   -10 Fr 5 cm straight stent with internal and external flaps was placed into the bile duct   -Discussion of cholecystectomy as part of mgmt plan per surgical team        Pt seen and examined at bedside, NAD, no complaints at this time. ROS s/f ?, remainder of ROS otherwise unremarkable   Telemetry:    OBJECTIVE  T(C): 37.3 (08-23-23 @ 05:01), Max: 37.4 (08-23-23 @ 01:01)  HR: 89 (08-23-23 @ 07:00) (79 - 106)  BP: 149/72 (08-23-23 @ 07:00) (122/65 - 157/76)  RR: 18 (08-23-23 @ 07:00) (15 - 28)  SpO2: 98% (08-23-23 @ 07:00) (93% - 100%)    08-22-23 @ 07:01  -  08-23-23 @ 07:00  --------------------------------------------------------  IN: 2780 mL / OUT: 1050 mL / NET: 1730 mL    08-23-23 @ 07:01  -  08-23-23 @ 08:09  --------------------------------------------------------  IN: 110 mL / OUT: 0 mL / NET: 110 mL        PHYSICAL EXAM:  LABS:                        10.1   13.87 )-----------( 357      ( 23 Aug 2023 05:30 )             32.1     08-23    136  |  104  |  8   ----------------------------<  133<H>  4.0   |  23  |  0.77    Ca    8.7      23 Aug 2023 05:30  Phos  2.8     08-23  Mg     1.9     08-23    TPro  6.6  /  Alb  2.9<L>  /  TBili  3.5<H>  /  DBili  3.2<H>  /  AST  125<H>  /  ALT  141<H>  /  AlkPhos  213<H>  08-23    PT/INR - ( 22 Aug 2023 12:30 )   PT: 12.9 sec;   INR: 1.14          PTT - ( 22 Aug 2023 12:30 )  PTT:25.0 sec  Urinalysis Basic - ( 23 Aug 2023 05:30 )    Color: x / Appearance: x / SG: x / pH: x  Gluc: 133 mg/dL / Ketone: x  / Bili: x / Urobili: x   Blood: x / Protein: x / Nitrite: x   Leuk Esterase: x / RBC: x / WBC x   Sq Epi: x / Non Sq Epi: x / Bacteria: x        RADIOLOGY & ADDITIONAL TESTS:  Reviewed .    MEDICATIONS  (STANDING):  cefTRIAXone   IVPB 2000 milliGRAM(s) IV Intermittent every 24 hours  chlorhexidine 2% Cloths 1 Application(s) Topical daily  chlorhexidine 4% Liquid 1 Application(s) Topical <User Schedule>  dextrose 5%. 1000 milliLiter(s) (100 mL/Hr) IV Continuous <Continuous>  dextrose 5%. 1000 milliLiter(s) (50 mL/Hr) IV Continuous <Continuous>  dextrose 50% Injectable 25 Gram(s) IV Push once  dextrose 50% Injectable 25 Gram(s) IV Push once  dextrose 50% Injectable 12.5 Gram(s) IV Push once  glucagon  Injectable 1 milliGRAM(s) IntraMuscular once  heparin   Injectable 7500 Unit(s) SubCutaneous every 8 hours  insulin lispro (ADMELOG) corrective regimen sliding scale   SubCutaneous every 6 hours  lactated ringers. 1000 milliLiter(s) (110 mL/Hr) IV Continuous <Continuous>  metroNIDAZOLE  IVPB 500 milliGRAM(s) IV Intermittent every 8 hours    MEDICATIONS  (PRN):  acetaminophen   IVPB .. 1000 milliGRAM(s) IV Intermittent once PRN Mild Pain (1 - 3)  dextrose Oral Gel 15 Gram(s) Oral once PRN Blood Glucose LESS THAN 70 milliGRAM(s)/deciliter  HYDROmorphone  Injectable 0.5 milliGRAM(s) IV Push every 3 hours PRN Severe Pain (7 - 10)  HYDROmorphone  Injectable 0.25 milliGRAM(s) IV Push every 3 hours PRN Moderate Pain (4 - 6)  ondansetron Injectable 4 milliGRAM(s) IV Push every 8 hours PRN Nausea and/or Vomiting     Cardiology Consult    Interval History/HPI:   ERCP 8/22:   -Food debris was seen in the stomach.    -The major papilla was identified and appeared to be slightly bulging.   -Duct was moderately dilated with multiple filling defects noted. A small sphincterotomy was performed without complications.   -10 Fr 5 cm straight stent with internal and external flaps was placed into the bile duct   -Discussion of cholecystectomy as part of mgmt plan per surgical team    Patient seen at the bedside. Feeling much better, in good spirits.     Telemetry: NSR    OBJECTIVE  T(C): 37.3 (08-23-23 @ 05:01), Max: 37.4 (08-23-23 @ 01:01)  HR: 89 (08-23-23 @ 07:00) (79 - 106)  BP: 149/72 (08-23-23 @ 07:00) (122/65 - 157/76)  RR: 18 (08-23-23 @ 07:00) (15 - 28)  SpO2: 98% (08-23-23 @ 07:00) (93% - 100%)    08-22-23 @ 07:01  -  08-23-23 @ 07:00  --------------------------------------------------------  IN: 2780 mL / OUT: 1050 mL / NET: 1730 mL    08-23-23 @ 07:01  -  08-23-23 @ 08:09  --------------------------------------------------------  IN: 110 mL / OUT: 0 mL / NET: 110 mL        PHYSICAL EXAM:  pleasant elderly female, NAD   rrr  ctab  soft ntnd  no le edema, wwp    LABS:                        10.1   13.87 )-----------( 357      ( 23 Aug 2023 05:30 )             32.1     08-23    136  |  104  |  8   ----------------------------<  133<H>  4.0   |  23  |  0.77    Ca    8.7      23 Aug 2023 05:30  Phos  2.8     08-23  Mg     1.9     08-23    TPro  6.6  /  Alb  2.9<L>  /  TBili  3.5<H>  /  DBili  3.2<H>  /  AST  125<H>  /  ALT  141<H>  /  AlkPhos  213<H>  08-23    PT/INR - ( 22 Aug 2023 12:30 )   PT: 12.9 sec;   INR: 1.14          PTT - ( 22 Aug 2023 12:30 )  PTT:25.0 sec  Urinalysis Basic - ( 23 Aug 2023 05:30 )    Color: x / Appearance: x / SG: x / pH: x  Gluc: 133 mg/dL / Ketone: x  / Bili: x / Urobili: x   Blood: x / Protein: x / Nitrite: x   Leuk Esterase: x / RBC: x / WBC x   Sq Epi: x / Non Sq Epi: x / Bacteria: x        RADIOLOGY & ADDITIONAL TESTS:  Reviewed .    MEDICATIONS  (STANDING):  cefTRIAXone   IVPB 2000 milliGRAM(s) IV Intermittent every 24 hours  chlorhexidine 2% Cloths 1 Application(s) Topical daily  chlorhexidine 4% Liquid 1 Application(s) Topical <User Schedule>  dextrose 5%. 1000 milliLiter(s) (100 mL/Hr) IV Continuous <Continuous>  dextrose 5%. 1000 milliLiter(s) (50 mL/Hr) IV Continuous <Continuous>  dextrose 50% Injectable 25 Gram(s) IV Push once  dextrose 50% Injectable 25 Gram(s) IV Push once  dextrose 50% Injectable 12.5 Gram(s) IV Push once  glucagon  Injectable 1 milliGRAM(s) IntraMuscular once  heparin   Injectable 7500 Unit(s) SubCutaneous every 8 hours  insulin lispro (ADMELOG) corrective regimen sliding scale   SubCutaneous every 6 hours  lactated ringers. 1000 milliLiter(s) (110 mL/Hr) IV Continuous <Continuous>  metroNIDAZOLE  IVPB 500 milliGRAM(s) IV Intermittent every 8 hours    MEDICATIONS  (PRN):  acetaminophen   IVPB .. 1000 milliGRAM(s) IV Intermittent once PRN Mild Pain (1 - 3)  dextrose Oral Gel 15 Gram(s) Oral once PRN Blood Glucose LESS THAN 70 milliGRAM(s)/deciliter  HYDROmorphone  Injectable 0.5 milliGRAM(s) IV Push every 3 hours PRN Severe Pain (7 - 10)  HYDROmorphone  Injectable 0.25 milliGRAM(s) IV Push every 3 hours PRN Moderate Pain (4 - 6)  ondansetron Injectable 4 milliGRAM(s) IV Push every 8 hours PRN Nausea and/or Vomiting

## 2023-08-23 NOTE — PROGRESS NOTE ADULT - SUBJECTIVE AND OBJECTIVE BOX
ON: post op labs with increasing LFTs, but decreasing WBC.  UA negative. HSQ restarted.    SUBJECTIVE: Patient seen and examined at bedside. In good spirits, states pain has resolved, able to [place]uan CLD without n/v. Denies cp, sob.    MEDICATIONS  (STANDING):  amLODIPine   Tablet 5 milliGRAM(s) Oral every 24 hours  cefTRIAXone   IVPB 2000 milliGRAM(s) IV Intermittent every 24 hours  chlorhexidine 2% Cloths 1 Application(s) Topical daily  chlorhexidine 4% Liquid 1 Application(s) Topical <User Schedule>  dextrose 5%. 1000 milliLiter(s) (100 mL/Hr) IV Continuous <Continuous>  dextrose 5%. 1000 milliLiter(s) (50 mL/Hr) IV Continuous <Continuous>  dextrose 50% Injectable 25 Gram(s) IV Push once  dextrose 50% Injectable 25 Gram(s) IV Push once  dextrose 50% Injectable 12.5 Gram(s) IV Push once  enoxaparin Injectable 40 milliGRAM(s) SubCutaneous every 12 hours  glucagon  Injectable 1 milliGRAM(s) IntraMuscular once  heparin   Injectable 7500 Unit(s) SubCutaneous once  insulin lispro (ADMELOG) corrective regimen sliding scale   SubCutaneous every 6 hours  metoprolol succinate ER 50 milliGRAM(s) Oral every 24 hours  metroNIDAZOLE  IVPB 500 milliGRAM(s) IV Intermittent every 8 hours    MEDICATIONS  (PRN):  acetaminophen   IVPB .. 1000 milliGRAM(s) IV Intermittent once PRN Mild Pain (1 - 3)  dextrose Oral Gel 15 Gram(s) Oral once PRN Blood Glucose LESS THAN 70 milliGRAM(s)/deciliter  HYDROmorphone  Injectable 0.5 milliGRAM(s) IV Push every 3 hours PRN Severe Pain (7 - 10)  HYDROmorphone  Injectable 0.25 milliGRAM(s) IV Push every 3 hours PRN Moderate Pain (4 - 6)  ondansetron Injectable 4 milliGRAM(s) IV Push every 8 hours PRN Nausea and/or Vomiting      Drips:     ICU Vital Signs Last 24 Hrs  T(C): 36.9 (23 Aug 2023 09:00), Max: 37.4 (23 Aug 2023 01:01)  T(F): 98.5 (23 Aug 2023 09:00), Max: 99.3 (23 Aug 2023 01:01)  HR: 82 (23 Aug 2023 09:00) (79 - 99)  BP: 149/72 (23 Aug 2023 07:00) (134/62 - 157/76)  BP(mean): 102 (23 Aug 2023 07:00) (89 - 109)  ABP: 155/62 (23 Aug 2023 09:00) (113/67 - 169/77)  ABP(mean): 101 (23 Aug 2023 09:00) (84 - 118)  RR: 18 (23 Aug 2023 09:00) (15 - 28)  SpO2: 98% (23 Aug 2023 09:00) (93% - 100%)    O2 Parameters below as of 23 Aug 2023 08:00  Patient On (Oxygen Delivery Method): room air            Physical Exam:  General: NAD  HEENT: NC/AT, EOMI, PERRLA, normal hearing, no oral lesions, neck supple w/o LAD  Pulmonary: Nonlabored breathing, no respiratory distress, L sided basilar crackles  Cardiovascular: NSR, no murmurs  Abdominal: soft, NT/ND, +BS, no organomegaly  Extremities: WWP, 5/5 strength x 4, no clubbing/cyanosis/edema  Neuro: A/O x3, CNs II-XII grossly intact, normal motor/sensation, no focal deficits  Pulses: palpable distal pulses      I&O's Summary    22 Aug 2023 07:01  -  23 Aug 2023 07:00  --------------------------------------------------------  IN: 2780 mL / OUT: 1050 mL / NET: 1730 mL    23 Aug 2023 07:01  -  23 Aug 2023 11:20  --------------------------------------------------------  IN: 110 mL / OUT: 0 mL / NET: 110 mL        LABS:                        10.1   13.87 )-----------( 357      ( 23 Aug 2023 05:30 )             32.1     08-23    136  |  104  |  8   ----------------------------<  133<H>  4.0   |  23  |  0.77    Ca    8.7      23 Aug 2023 05:30  Phos  2.8     08-23  Mg     1.9     08-23    TPro  6.6  /  Alb  2.9<L>  /  TBili  3.5<H>  /  DBili  3.2<H>  /  AST  125<H>  /  ALT  141<H>  /  AlkPhos  213<H>  08-23    PT/INR - ( 22 Aug 2023 12:30 )   PT: 12.9 sec;   INR: 1.14          PTT - ( 22 Aug 2023 12:30 )  PTT:25.0 sec  Urinalysis Basic - ( 23 Aug 2023 05:30 )    Color: x / Appearance: x / SG: x / pH: x  Gluc: 133 mg/dL / Ketone: x  / Bili: x / Urobili: x   Blood: x / Protein: x / Nitrite: x   Leuk Esterase: x / RBC: x / WBC x   Sq Epi: x / Non Sq Epi: x / Bacteria: x      CAPILLARY BLOOD GLUCOSE      POCT Blood Glucose.: 121 mg/dL (23 Aug 2023 07:13)  POCT Blood Glucose.: 138 mg/dL (22 Aug 2023 17:35)    LIVER FUNCTIONS - ( 23 Aug 2023 05:30 )  Alb: 2.9 g/dL / Pro: 6.6 g/dL / ALK PHOS: 213 U/L / ALT: 141 U/L / AST: 125 U/L / GGT: x             Cultures:    RADIOLOGY & ADDITIONAL STUDIES:

## 2023-08-23 NOTE — DIETITIAN INITIAL EVALUATION ADULT - ADD RECOMMEND
1. c/w current diet order  - monitor intake & tolerance  2. Hydration per team  - sodium low  3. GI  - antiemetic agent prn to promote tolerance  4. Monitor chemistry, GI function, skin integrity  5. Pain & bowel regimen per team

## 2023-08-23 NOTE — PROGRESS NOTE ADULT - ASSESSMENT
89 yo F with HTN admitted for acute gallstone pancreatitis c/b choledocholithiasis as with suspected mild acute cholangitis    Now s/p ERCP on 8/22 with small sphincterotomy and placement of 10 Fr 5 cm straight biliary stent with internal and external flaps    WBCs and liver enzymes improved today, bili slightly increased.  Clinically doing well otherwise    Recommendations:  -ERCP in 4 to 8 weeks for stent management  -Discussion of cholecystectomy as part of mgmt plan per surgical team  -Continue abx  -Pt should follow up with Dr. Burch as outpatient in 2-3 weeks  -Trend CBC, CMP daily    Jose Gallardo DO  Gastroenterology Fellow  Pager: [phone]  After 5PM or on weekends, please contact [place]  for fellow on call   89 yo F with HTN admitted for acute gallstone pancreatitis c/b choledocholithiasis as with suspected mild acute cholangitis    Now s/p ERCP on 8/22 with small sphincterotomy and placement of 10 Fr 5 cm straight biliary stent with internal and external flaps    WBCs and liver enzymes improved today, bili slightly increased.  Clinically doing well otherwise    Recommendations:  -ERCP in 4 to 8 weeks for stent management  -Discussion of cholecystectomy as part of mgmt plan per surgical team  -Continue abx  -Pt should follow up with Dr. Burch as outpatient in 2-3 weeks  -Trend CBC, CMP daily    Please request that the patient schedule GI follow-up at the clinic located on the fourth floor of [facility] [address] by calling the office at [phone]. Our office will also contact the patient, however it is important that the patient reach out if she does not hear from us within 7 days.    Gastroenterology service will sign off  Case discussed with attending physician  Please recall as needed with any GI related questions    Thank you for this consult.     Jose Gallardo,   Gastroenterology Fellow  Pager: [phone]  After 5PM or on weekends, please contact [place]  for fellow on call

## 2023-08-23 NOTE — PROGRESS NOTE ADULT - ASSESSMENT
89 year old female with PMH of HTN, gout, presenting to the ED with 1 day of constant dull RUQ pain. Admitted for obstructive gallstone pancreatitis vs choledocholithiasis vs acute cholecystis now s/p ERCP w/ sphincterotomy and stent placement (8/22).    Neuro: tylenol for pain, zofran for nausea  CV: MAP >65, restart metoprolol & amlodipine   Pulm: satting well on RA  GI: CLD. s/p ERCP w/ sphincterotomy and stent placement. f/u PM LFTs.  : voids. UA neg.  ID: CTX (8/22- ), flagyl (8/22- ), zosyn (8/22-8/22)  Endo: ISS  Heme/PPx: SQH, SCDs  Lines: PIVs  PT/OT: ordered.  Dispo: SICU

## 2023-08-23 NOTE — PROGRESS NOTE ADULT - SUBJECTIVE AND OBJECTIVE BOX
Pt seen and examined at bedside.  NAEO. Afebrile. Comfortable. No abd pain    Allergies    No Known Allergies    Intolerances        MEDICATIONS:  MEDICATIONS  (STANDING):  cefTRIAXone   IVPB 2000 milliGRAM(s) IV Intermittent every 24 hours  chlorhexidine 2% Cloths 1 Application(s) Topical daily  chlorhexidine 4% Liquid 1 Application(s) Topical <User Schedule>  dextrose 5%. 1000 milliLiter(s) (50 mL/Hr) IV Continuous <Continuous>  dextrose 5%. 1000 milliLiter(s) (100 mL/Hr) IV Continuous <Continuous>  dextrose 50% Injectable 12.5 Gram(s) IV Push once  dextrose 50% Injectable 25 Gram(s) IV Push once  dextrose 50% Injectable 25 Gram(s) IV Push once  glucagon  Injectable 1 milliGRAM(s) IntraMuscular once  heparin   Injectable 7500 Unit(s) SubCutaneous every 8 hours  insulin lispro (ADMELOG) corrective regimen sliding scale   SubCutaneous every 6 hours  lactated ringers. 1000 milliLiter(s) (110 mL/Hr) IV Continuous <Continuous>  metroNIDAZOLE  IVPB 500 milliGRAM(s) IV Intermittent every 8 hours    MEDICATIONS  (PRN):  acetaminophen   IVPB .. 1000 milliGRAM(s) IV Intermittent once PRN Mild Pain (1 - 3)  dextrose Oral Gel 15 Gram(s) Oral once PRN Blood Glucose LESS THAN 70 milliGRAM(s)/deciliter  HYDROmorphone  Injectable 0.5 milliGRAM(s) IV Push every 3 hours PRN Severe Pain (7 - 10)  HYDROmorphone  Injectable 0.25 milliGRAM(s) IV Push every 3 hours PRN Moderate Pain (4 - 6)  ondansetron Injectable 4 milliGRAM(s) IV Push every 8 hours PRN Nausea and/or Vomiting      Vital Signs Last 24 Hrs  T(C): 37.3 (23 Aug 2023 05:01), Max: 37.4 (23 Aug 2023 01:01)  T(F): 99.1 (23 Aug 2023 05:01), Max: 99.3 (23 Aug 2023 01:01)  HR: 89 (23 Aug 2023 07:00) (79 - 106)  BP: 149/72 (23 Aug 2023 07:00) (122/65 - 157/76)  BP(mean): 102 (23 Aug 2023 07:00) (87 - 109)  RR: 18 (23 Aug 2023 07:00) (15 - 28)  SpO2: 98% (23 Aug 2023 07:00) (93% - 100%)    Parameters below as of 23 Aug 2023 06:00  Patient On (Oxygen Delivery Method): room air        08-22 @ 07:01  -  08-23 @ 07:00  --------------------------------------------------------  IN: 2780 mL / OUT: 1050 mL / NET: 1730 mL    08-23 @ 07:01 - 08-23 @ 08:22  --------------------------------------------------------  IN: 110 mL / OUT: 0 mL / NET: 110 mL        PHYSICAL EXAM:    General: No acute distress  Cardiac: Regular rate on tele  Pulm: Normal resp effort  Gastrointestinal: Soft non-tender non-distended. No rebound or guarding  Skin: Warm and dry. No obvious rash    LABS:  CBC Full  -  ( 23 Aug 2023 05:30 )  WBC Count : 13.87 K/uL  RBC Count : 4.17 M/uL  Hemoglobin : 10.1 g/dL  Hematocrit : 32.1 %  Platelet Count - Automated : 357 K/uL  Mean Cell Volume : 77.0 fl  Mean Cell Hemoglobin : 24.2 pg  Mean Cell Hemoglobin Concentration : 31.5 gm/dL  Auto Neutrophil # : x  Auto Lymphocyte # : x  Auto Monocyte # : x  Auto Eosinophil # : x  Auto Basophil # : x  Auto Neutrophil % : x  Auto Lymphocyte % : x  Auto Monocyte % : x  Auto Eosinophil % : x  Auto Basophil % : x    08-23    136  |  104  |  8   ----------------------------<  133<H>  4.0   |  23  |  0.77    Ca    8.7      23 Aug 2023 05:30  Phos  2.8     08-23  Mg     1.9     08-23    TPro  6.6  /  Alb  2.9<L>  /  TBili  3.5<H>  /  DBili  3.2<H>  /  AST  125<H>  /  ALT  141<H>  /  AlkPhos  213<H>  08-23    PT/INR - ( 22 Aug 2023 12:30 )   PT: 12.9 sec;   INR: 1.14          PTT - ( 22 Aug 2023 12:30 )  PTT:25.0 sec      Urinalysis Basic - ( 23 Aug 2023 05:30 )    Color: x / Appearance: x / SG: x / pH: x  Gluc: 133 mg/dL / Ketone: x  / Bili: x / Urobili: x   Blood: x / Protein: x / Nitrite: x   Leuk Esterase: x / RBC: x / WBC x   Sq Epi: x / Non Sq Epi: x / Bacteria: x

## 2023-08-23 NOTE — PROGRESS NOTE ADULT - ASSESSMENT
89 year old female with PMH of HTN, gout, presenting to the ED with 1 day of constant dull RUQ pain. Admitted for obstructive gallstone pancreatitis vs choledocholithiasis vs acute cholecystis now s/p ERCP w/ sphincterotomy and stent placement (8/22).     Plan:   - Encourage IS/OOB   - CLD   - DVT ppx   - Appreciate SICU care     D/w chief resident and attending

## 2023-08-23 NOTE — PROGRESS NOTE ADULT - ASSESSMENT
89 year old female with PMH of HTN and nonobstructive CAD who presented with RUQ abdominal pain. Afebrile with leukocytosis, elevated lipase, lfts, and t bili. CTAP  choledocholithiasis with associated mild intrahepatic and extrahepatic biliary ductal dilation concerning for obstructive gallstone pancreatitis vs choledocholithiases vs acute cholecystis. Planned for ERCP and possible cholecystectomy. Cardiology consulted for preoperative risk assessment.     EKG 8/22/2023: NSR, 1st degree av block, qtc 470  TTE 5/2020: 1. Small left ventricular cavity size.2. Normal left ventricular systolic function.3. Grade I left ventricular diastolic dysfunction without elevated filling pressure.  4. Moderate symmetric left ventricular hypertrophy 5. Normal right ventricular size and systolic function. 6. No significant valvular disease. 7. Pulmonary hypertension present, pulmonary artery systolic pressure is 52 mmHg. 8. No pericardial effusion.  CCTA 2020:  The calcium score is mild at 71  Agatston units. Non-obstructive coronary artery disease. Dilated main PA, measuring 3.8cm x 3.6cm.  Enlarged RV with flattening of the interventricular septum. The calculated LVEF is 75% with normal wall motion and wall thickness.  NST 2018: nl    #Post-operative Cardiovascular optimization  - restart home Toprol 50mg qd  - restart home amlodipine 5mg qd    Case d/w Dr. Ha  Plan d/w team   Please reconsult as needed      Lida Arzate  Cardiology fellow

## 2023-08-23 NOTE — DIETITIAN INITIAL EVALUATION ADULT - OTHER INFO
89 year old female with PMH of HTN, gout, presenting to the ED with 1 day of constant dull RUQ pain. Admitted for obstructive gallstone pancreatitis vs choledocholithiasis vs acute cholecystis now s/p ERCP w/ sphincterotomy and stent placement (8/22).     Chart reviewed. Pt seen at bedside on 5 EA with IDT today during AM rounds. On room air. Diet advanced from clear liquid diet to low fat diet today. Pt reports NKFA, no cultural/ethnic/[place] food preferences provided. Pt appears well-nourished, no overt muscle wasting/subcutaneous losses. Reported good appetite & PO intake at baseline, no recent weight changes. Pt denies chew/swallow difficulty, denies n/v/c/d/abd pain. BM noted today. Afebrile. HR & BP WNL, MAPs >65 mmHg. Labs reviewed- WBC elevated, improving. Sodium low, LFTs elevated [improving]. Meds: on zofran.    Pain: denies pain/discomfort  Skin: Nav score 16  GI: soft, nontender. BM noted today

## 2023-08-23 NOTE — PROGRESS NOTE ADULT - ATTENDING COMMENTS
Patient is a 89 year old female with PMH of HTN and nonobstructive CAD who presented with RUQ abdominal pain found to have choledocholithiasis with associated mild intrahepatic and extrahepatic biliary ductal dilation concerning for obstructive gallstone pancreatitis s/p ERCP w/ sphincterotomy and stent placement (8/22). Cardiology originally consulted for preoperative risk assessment.     REVIEW OF STUDIES  - EKG 8/22/2023: NSR, 1st degree av block, qtc 470  - TTE 5/2020: Small left ventricular cavity size. Normal left ventricular systolic function. Grade I left ventricular diastolic dysfunction without elevated filling pressure. Moderate symmetric left ventricular hypertrophy. Normal right ventricular size and systolic function.  No significant valvular disease. Pulmonary hypertension present, pulmonary artery systolic pressure is 52 mmHg.   - CCTA 2020:  The calcium score is mild at 71  Agatston units. Non-obstructive coronary artery disease. Dilated main PA, measuring 3.8cm x 3.6cm.  Enlarged RV with flattening of the interventricular septum. The calculated LVEF is 75% with normal wall motion and wall thickness.  - NST 2018: Normal Biventricular function    #Post Operative CV optimization  - Ischemic workup performed in 2020 in the setting of chest discomfort revealed NOCAD and last NST in 2018 was unremarkable  - Clinically she remains without chest pain, palpitations or other anginal equivalents.   - Recommend resuming  patient's home Toprol 50mg po qd and norvasc 5 mg       Please reconsult cardiology as needed

## 2023-08-23 NOTE — DIETITIAN INITIAL EVALUATION ADULT - PERTINENT MEDS FT
MEDICATIONS  (STANDING):  amLODIPine   Tablet 5 milliGRAM(s) Oral every 24 hours  cefTRIAXone   IVPB 2000 milliGRAM(s) IV Intermittent every 24 hours  chlorhexidine 2% Cloths 1 Application(s) Topical daily  chlorhexidine 4% Liquid 1 Application(s) Topical <User Schedule>  dextrose 5%. 1000 milliLiter(s) (50 mL/Hr) IV Continuous <Continuous>  dextrose 5%. 1000 milliLiter(s) (100 mL/Hr) IV Continuous <Continuous>  dextrose 50% Injectable 12.5 Gram(s) IV Push once  dextrose 50% Injectable 25 Gram(s) IV Push once  dextrose 50% Injectable 25 Gram(s) IV Push once  enoxaparin Injectable 40 milliGRAM(s) SubCutaneous every 12 hours  glucagon  Injectable 1 milliGRAM(s) IntraMuscular once  insulin lispro (ADMELOG) corrective regimen sliding scale   SubCutaneous every 6 hours  metoprolol succinate ER 50 milliGRAM(s) Oral every 24 hours  metroNIDAZOLE  IVPB 500 milliGRAM(s) IV Intermittent every 8 hours    MEDICATIONS  (PRN):  acetaminophen   IVPB .. 1000 milliGRAM(s) IV Intermittent once PRN Mild Pain (1 - 3)  dextrose Oral Gel 15 Gram(s) Oral once PRN Blood Glucose LESS THAN 70 milliGRAM(s)/deciliter  HYDROmorphone  Injectable 0.5 milliGRAM(s) IV Push every 3 hours PRN Severe Pain (7 - 10)  HYDROmorphone  Injectable 0.25 milliGRAM(s) IV Push every 3 hours PRN Moderate Pain (4 - 6)  ondansetron Injectable 4 milliGRAM(s) IV Push every 8 hours PRN Nausea and/or Vomiting

## 2023-08-23 NOTE — PROGRESS NOTE ADULT - SUBJECTIVE AND OBJECTIVE BOX
Notify daughter have sent over prescription for macrobid, if symptoms do not resolve or worsen, needs to be seen for culture and exam. SUBJECTIVE:   Patient seen and examined at bedside this AM. Pleasantly conversational and in no acute distress   S/p ERCP 8/22 per GI - filling defects noted in CBD and stent was placed   Abdominal pain significantly better   Denied nausea or chills       MEDICATIONS  (STANDING):  amLODIPine   Tablet 5 milliGRAM(s) Oral every 24 hours  cefTRIAXone   IVPB 2000 milliGRAM(s) IV Intermittent every 24 hours  chlorhexidine 2% Cloths 1 Application(s) Topical daily  chlorhexidine 4% Liquid 1 Application(s) Topical <User Schedule>  dextrose 5%. 1000 milliLiter(s) (50 mL/Hr) IV Continuous <Continuous>  dextrose 5%. 1000 milliLiter(s) (100 mL/Hr) IV Continuous <Continuous>  dextrose 50% Injectable 12.5 Gram(s) IV Push once  dextrose 50% Injectable 25 Gram(s) IV Push once  dextrose 50% Injectable 25 Gram(s) IV Push once  enoxaparin Injectable 40 milliGRAM(s) SubCutaneous every 12 hours  glucagon  Injectable 1 milliGRAM(s) IntraMuscular once  heparin   Injectable 7500 Unit(s) SubCutaneous once  insulin lispro (ADMELOG) corrective regimen sliding scale   SubCutaneous every 6 hours  metoprolol succinate ER 50 milliGRAM(s) Oral every 24 hours  metroNIDAZOLE  IVPB 500 milliGRAM(s) IV Intermittent every 8 hours    MEDICATIONS  (PRN):  acetaminophen   IVPB .. 1000 milliGRAM(s) IV Intermittent once PRN Mild Pain (1 - 3)  dextrose Oral Gel 15 Gram(s) Oral once PRN Blood Glucose LESS THAN 70 milliGRAM(s)/deciliter  HYDROmorphone  Injectable 0.5 milliGRAM(s) IV Push every 3 hours PRN Severe Pain (7 - 10)  HYDROmorphone  Injectable 0.25 milliGRAM(s) IV Push every 3 hours PRN Moderate Pain (4 - 6)  ondansetron Injectable 4 milliGRAM(s) IV Push every 8 hours PRN Nausea and/or Vomiting      Vital Signs Last 24 Hrs  T(C): 36.9 (23 Aug 2023 09:00), Max: 37.4 (23 Aug 2023 01:01)  T(F): 98.5 (23 Aug 2023 09:00), Max: 99.3 (23 Aug 2023 01:01)  HR: 85 (23 Aug 2023 11:00) (79 - 99)  BP: 149/72 (23 Aug 2023 07:00) (134/62 - 157/76)  BP(mean): 102 (23 Aug 2023 07:00) (89 - 109)  RR: 20 (23 Aug 2023 11:00) (15 - 28)  SpO2: 97% (23 Aug 2023 11:00) (93% - 100%)    Parameters below as of 23 Aug 2023 11:00  Patient On (Oxygen Delivery Method): room air      Physical Exam:  General: NAD  HEENT: NC/AT, EOMI, PERRLA, normal hearing, no oral lesions, neck supple w/o LAD  Pulmonary: Nonlabored breathing, no respiratory distress, L sided basilar crackles  Cardiovascular: NSR   Abdominal: soft, non-distended and non-tender   Extremities: WWP, 5/5 strength x 4, no clubbing/cyanosis/edema  Neuro:No focal deficits      I&O's Summary    22 Aug 2023 07:01  -  23 Aug 2023 07:00  --------------------------------------------------------  IN: 2780 mL / OUT: 1050 mL / NET: 1730 mL    23 Aug 2023 07:01  -  23 Aug 2023 12:10  --------------------------------------------------------  IN: 360 mL / OUT: 1000 mL / NET: -640 mL        LABS:                        10.1   13.87 )-----------( 357      ( 23 Aug 2023 05:30 )             32.1     08-23    136  |  104  |  8   ----------------------------<  133<H>  4.0   |  23  |  0.77    Ca    8.7      23 Aug 2023 05:30  Phos  2.8     08-23  Mg     1.9     08-23    TPro  6.6  /  Alb  2.9<L>  /  TBili  3.5<H>  /  DBili  3.2<H>  /  AST  125<H>  /  ALT  141<H>  /  AlkPhos  213<H>  08-23    PT/INR - ( 22 Aug 2023 12:30 )   PT: 12.9 sec;   INR: 1.14          PTT - ( 22 Aug 2023 12:30 )  PTT:25.0 sec  Urinalysis Basic - ( 23 Aug 2023 05:30 )    Color: x / Appearance: x / SG: x / pH: x  Gluc: 133 mg/dL / Ketone: x  / Bili: x / Urobili: x   Blood: x / Protein: x / Nitrite: x   Leuk Esterase: x / RBC: x / WBC x   Sq Epi: x / Non Sq Epi: x / Bacteria: x      CAPILLARY BLOOD GLUCOSE      POCT Blood Glucose.: 121 mg/dL (23 Aug 2023 07:13)  POCT Blood Glucose.: 138 mg/dL (22 Aug 2023 17:35)    LIVER FUNCTIONS - ( 23 Aug 2023 05:30 )  Alb: 2.9 g/dL / Pro: 6.6 g/dL / ALK PHOS: 213 U/L / ALT: 141 U/L / AST: 125 U/L / GGT: x             RADIOLOGY & ADDITIONAL STUDIES:

## 2023-08-23 NOTE — DIETITIAN INITIAL EVALUATION ADULT - OTHER CALCULATIONS
Ideal body weight (49.8kg) used for calculations as pt >120% of IBW. Needs estimated for age and adjusted for current clinical status. Fluid needs per team

## 2023-08-23 NOTE — DIETITIAN INITIAL EVALUATION ADULT - PERTINENT LABORATORY DATA
08-23    132<L>  |  101  |  5<L>  ----------------------------<  180<H>  3.9   |  20<L>  |  0.74    Ca    8.1<L>      23 Aug 2023 13:02  Phos  2.8     08-23  Mg     1.9     08-23    TPro  6.6  /  Alb  3.0<L>  /  TBili  2.4<H>  /  DBili  2.0<H>  /  AST  108<H>  /  ALT  132<H>  /  AlkPhos  221<H>  08-23  POCT Blood Glucose.: 114 mg/dL (08-23-23 @ 16:35)  A1C with Estimated Average Glucose Result: 5.8 % (08-23-23 @ 05:30)  A1C with Estimated Average Glucose Result: 6.1 % (08-22-23 @ 12:30)

## 2023-08-23 NOTE — PROGRESS NOTE ADULT - ATTENDING COMMENTS
s/p ERCP with sphincterotomy and stent for choledocholithiasis  physical as above  continue ceftriaxone and flagyl  WBC down  LFTs improving though bilirubin higher this AM, now decreasing  clear liquid diet

## 2023-08-24 PROBLEM — Z00.00 ENCOUNTER FOR PREVENTIVE HEALTH EXAMINATION: Status: ACTIVE | Noted: 2023-08-24

## 2023-08-24 PROBLEM — R01.1 CARDIAC MURMUR, UNSPECIFIED: Chronic | Status: ACTIVE | Noted: 2023-08-22

## 2023-08-24 LAB
ALBUMIN SERPL ELPH-MCNC: 2.9 G/DL — LOW (ref 3.3–5)
ALP SERPL-CCNC: 244 U/L — HIGH (ref 40–120)
ALT FLD-CCNC: 117 U/L — HIGH (ref 10–45)
ANION GAP SERPL CALC-SCNC: 9 MMOL/L — SIGNIFICANT CHANGE UP (ref 5–17)
AST SERPL-CCNC: 75 U/L — HIGH (ref 10–40)
BILIRUB DIRECT SERPL-MCNC: 0.7 MG/DL — HIGH (ref 0–0.3)
BILIRUB INDIRECT FLD-MCNC: 0.6 MG/DL — SIGNIFICANT CHANGE UP (ref 0.2–1)
BILIRUB SERPL-MCNC: 1.3 MG/DL — HIGH (ref 0.2–1.2)
BUN SERPL-MCNC: 6 MG/DL — LOW (ref 7–23)
CALCIUM SERPL-MCNC: 9 MG/DL — SIGNIFICANT CHANGE UP (ref 8.4–10.5)
CHLORIDE SERPL-SCNC: 103 MMOL/L — SIGNIFICANT CHANGE UP (ref 96–108)
CO2 SERPL-SCNC: 23 MMOL/L — SIGNIFICANT CHANGE UP (ref 22–31)
CREAT SERPL-MCNC: 0.83 MG/DL — SIGNIFICANT CHANGE UP (ref 0.5–1.3)
EGFR: 66 ML/MIN/1.73M2 — SIGNIFICANT CHANGE UP
GLUCOSE BLDC GLUCOMTR-MCNC: 107 MG/DL — HIGH (ref 70–99)
GLUCOSE BLDC GLUCOMTR-MCNC: 109 MG/DL — HIGH (ref 70–99)
GLUCOSE BLDC GLUCOMTR-MCNC: 131 MG/DL — HIGH (ref 70–99)
GLUCOSE BLDC GLUCOMTR-MCNC: 96 MG/DL — SIGNIFICANT CHANGE UP (ref 70–99)
GLUCOSE SERPL-MCNC: 125 MG/DL — HIGH (ref 70–99)
HCT VFR BLD CALC: 36.8 % — SIGNIFICANT CHANGE UP (ref 34.5–45)
HGB BLD-MCNC: 11.3 G/DL — LOW (ref 11.5–15.5)
MAGNESIUM SERPL-MCNC: 2 MG/DL — SIGNIFICANT CHANGE UP (ref 1.6–2.6)
MCHC RBC-ENTMCNC: 24.2 PG — LOW (ref 27–34)
MCHC RBC-ENTMCNC: 30.7 GM/DL — LOW (ref 32–36)
MCV RBC AUTO: 79 FL — LOW (ref 80–100)
NRBC # BLD: 0 /100 WBCS — SIGNIFICANT CHANGE UP (ref 0–0)
PHOSPHATE SERPL-MCNC: 3.5 MG/DL — SIGNIFICANT CHANGE UP (ref 2.5–4.5)
PLATELET # BLD AUTO: 384 K/UL — SIGNIFICANT CHANGE UP (ref 150–400)
POTASSIUM SERPL-MCNC: 4.2 MMOL/L — SIGNIFICANT CHANGE UP (ref 3.5–5.3)
POTASSIUM SERPL-SCNC: 4.2 MMOL/L — SIGNIFICANT CHANGE UP (ref 3.5–5.3)
PROT SERPL-MCNC: 7.2 G/DL — SIGNIFICANT CHANGE UP (ref 6–8.3)
RBC # BLD: 4.66 M/UL — SIGNIFICANT CHANGE UP (ref 3.8–5.2)
RBC # FLD: 22.4 % — HIGH (ref 10.3–14.5)
SODIUM SERPL-SCNC: 135 MMOL/L — SIGNIFICANT CHANGE UP (ref 135–145)
WBC # BLD: 9.78 K/UL — SIGNIFICANT CHANGE UP (ref 3.8–10.5)
WBC # FLD AUTO: 9.78 K/UL — SIGNIFICANT CHANGE UP (ref 3.8–10.5)

## 2023-08-24 RX ADMIN — AMLODIPINE BESYLATE 5 MILLIGRAM(S): 2.5 TABLET ORAL at 06:11

## 2023-08-24 RX ADMIN — Medication 100 MILLIGRAM(S): at 11:43

## 2023-08-24 RX ADMIN — Medication 50 MILLIGRAM(S): at 13:23

## 2023-08-24 RX ADMIN — ENOXAPARIN SODIUM 40 MILLIGRAM(S): 100 INJECTION SUBCUTANEOUS at 21:36

## 2023-08-24 RX ADMIN — ENOXAPARIN SODIUM 40 MILLIGRAM(S): 100 INJECTION SUBCUTANEOUS at 09:23

## 2023-08-24 RX ADMIN — Medication 100 MILLIGRAM(S): at 03:57

## 2023-08-24 RX ADMIN — CHLORHEXIDINE GLUCONATE 1 APPLICATION(S): 213 SOLUTION TOPICAL at 05:33

## 2023-08-24 RX ADMIN — Medication 100 MILLIGRAM(S): at 18:23

## 2023-08-24 RX ADMIN — CEFTRIAXONE 100 MILLIGRAM(S): 500 INJECTION, POWDER, FOR SOLUTION INTRAMUSCULAR; INTRAVENOUS at 08:57

## 2023-08-24 NOTE — PHYSICAL THERAPY INITIAL EVALUATION ADULT - PERTINENT HX OF CURRENT PROBLEM, REHAB EVAL
89 year old female with PMH of HTN, gout, presenting to the ED with constant dull RUQ pain which started as epigastric pain associated with nausea and non bloody emesis along with chills. She denies any previous episodes to this before. Patient reports she tried Alk-[place] and Pep bismol with no relief. Admitted for obstructive gallstone pancreatitis vs choledocholithiasis vs acute cholecystis now s/p ERCP w/ sphincterotomy and stent placement (8/22).

## 2023-08-24 NOTE — PROGRESS NOTE ADULT - SUBJECTIVE AND OBJECTIVE BOX
ON: NIKKIE    SUBJECTIVE: Patient seen and examined at bedside. In good spirits, pain is well-controlled, able to yuan low fat diet without n/v. Denies cp, sob/.    MEDICATIONS  (STANDING):  amLODIPine   Tablet 5 milliGRAM(s) Oral every 24 hours  cefTRIAXone   IVPB 2000 milliGRAM(s) IV Intermittent every 24 hours  dextrose 5%. 1000 milliLiter(s) (100 mL/Hr) IV Continuous <Continuous>  dextrose 5%. 1000 milliLiter(s) (50 mL/Hr) IV Continuous <Continuous>  dextrose 50% Injectable 25 Gram(s) IV Push once  dextrose 50% Injectable 25 Gram(s) IV Push once  dextrose 50% Injectable 12.5 Gram(s) IV Push once  enoxaparin Injectable 40 milliGRAM(s) SubCutaneous every 12 hours  glucagon  Injectable 1 milliGRAM(s) IntraMuscular once  metoprolol succinate ER 50 milliGRAM(s) Oral every 24 hours  metroNIDAZOLE  IVPB 500 milliGRAM(s) IV Intermittent every 8 hours    MEDICATIONS  (PRN):  acetaminophen   IVPB .. 1000 milliGRAM(s) IV Intermittent once PRN Mild Pain (1 - 3)  dextrose Oral Gel 15 Gram(s) Oral once PRN Blood Glucose LESS THAN 70 milliGRAM(s)/deciliter  HYDROmorphone  Injectable 0.5 milliGRAM(s) IV Push every 3 hours PRN Severe Pain (7 - 10)  HYDROmorphone  Injectable 0.25 milliGRAM(s) IV Push every 3 hours PRN Moderate Pain (4 - 6)  ondansetron Injectable 4 milliGRAM(s) IV Push every 8 hours PRN Nausea and/or Vomiting      Drips:     ICU Vital Signs Last 24 Hrs  T(C): 36.6 (24 Aug 2023 06:01), Max: 37.7 (23 Aug 2023 21:55)  T(F): 97.8 (24 Aug 2023 06:01), Max: 99.8 (23 Aug 2023 21:55)  HR: 78 (24 Aug 2023 09:45) (68 - 104)  BP: 168/82 (24 Aug 2023 09:45) (132/63 - 178/83)  BP(mean): 116 (24 Aug 2023 09:45) (89 - 118)  ABP: 142/61 (23 Aug 2023 17:00) (142/61 - 172/78)  ABP(mean): 94 (23 Aug 2023 17:00) (94 - 118)  RR: 18 (24 Aug 2023 09:45) (18 - 30)  SpO2: 100% (24 Aug 2023 09:45) (92% - 100%)    O2 Parameters below as of 24 Aug 2023 09:45  Patient On (Oxygen Delivery Method): room air            Physical Exam:  General: NAD  HEENT: NC/AT, EOMI, PERRLA, normal hearing, no oral lesions, neck supple w/o LAD  Pulmonary: Nonlabored breathing, no respiratory distress, CTA-B, prior L basilar crackles not appreciated  Cardiovascular: NSR, no murmurs  Abdominal: soft, NT/ND, +BS, no organomegaly  Extremities: WWP, 5/5 strength x 4, no clubbing/cyanosis/edema  Neuro: A/O x3, CNs II-XII grossly intact, normal motor/sensation, no focal deficits  Pulses: palpable distal pulses      I&O's Summary    23 Aug 2023 07:01  -  24 Aug 2023 07:00  --------------------------------------------------------  IN: 460 mL / OUT: 2950 mL / NET: -2490 mL    24 Aug 2023 07:01  -  24 Aug 2023 11:21  --------------------------------------------------------  IN: 50 mL / OUT: 700 mL / NET: -650 mL        LABS:                        11.3   9.78  )-----------( 384      ( 24 Aug 2023 05:30 )             36.8     08-24    135  |  103  |  6<L>  ----------------------------<  125<H>  4.2   |  23  |  0.83    Ca    9.0      24 Aug 2023 05:30  Phos  3.5     08-24  Mg     2.0     08-24    TPro  7.2  /  Alb  2.9<L>  /  TBili  1.3<H>  /  DBili  0.7<H>  /  AST  75<H>  /  ALT  117<H>  /  AlkPhos  244<H>  08-24    PT/INR - ( 22 Aug 2023 12:30 )   PT: 12.9 sec;   INR: 1.14          PTT - ( 22 Aug 2023 12:30 )  PTT:25.0 sec  Urinalysis Basic - ( 24 Aug 2023 05:30 )    Color: x / Appearance: x / SG: x / pH: x  Gluc: 125 mg/dL / Ketone: x  / Bili: x / Urobili: x   Blood: x / Protein: x / Nitrite: x   Leuk Esterase: x / RBC: x / WBC x   Sq Epi: x / Non Sq Epi: x / Bacteria: x      CAPILLARY BLOOD GLUCOSE      POCT Blood Glucose.: 109 mg/dL (24 Aug 2023 06:14)  POCT Blood Glucose.: 107 mg/dL (24 Aug 2023 00:12)  POCT Blood Glucose.: 114 mg/dL (23 Aug 2023 16:35)  POCT Blood Glucose.: 211 mg/dL (23 Aug 2023 12:40)    LIVER FUNCTIONS - ( 24 Aug 2023 05:30 )  Alb: 2.9 g/dL / Pro: 7.2 g/dL / ALK PHOS: 244 U/L / ALT: 117 U/L / AST: 75 U/L / GGT: x             Cultures:    RADIOLOGY & ADDITIONAL STUDIES:

## 2023-08-24 NOTE — PHYSICAL THERAPY INITIAL EVALUATION ADULT - NSPTDISCHREC_GEN_A_CORE
with resumed HHA / family assist as needed. AZRA Chairez (8Lach) / AZRA Oquendo (5E) informed/Home PT

## 2023-08-24 NOTE — PROGRESS NOTE ADULT - SUBJECTIVE AND OBJECTIVE BOX
SUBJECTIVE:   Patient seen and examined at bedside this AM, pleasant, conversational, in no acute distress   No events overnight   Denied abdominal pain, nausea, chills or sob   Voiding spontaneously; purewick in place       MEDICATIONS  (STANDING):  amLODIPine   Tablet 5 milliGRAM(s) Oral every 24 hours  cefTRIAXone   IVPB 2000 milliGRAM(s) IV Intermittent every 24 hours  dextrose 5%. 1000 milliLiter(s) (50 mL/Hr) IV Continuous <Continuous>  dextrose 5%. 1000 milliLiter(s) (100 mL/Hr) IV Continuous <Continuous>  dextrose 50% Injectable 12.5 Gram(s) IV Push once  dextrose 50% Injectable 25 Gram(s) IV Push once  dextrose 50% Injectable 25 Gram(s) IV Push once  enoxaparin Injectable 40 milliGRAM(s) SubCutaneous every 12 hours  glucagon  Injectable 1 milliGRAM(s) IntraMuscular once  metoprolol succinate ER 50 milliGRAM(s) Oral every 24 hours  metroNIDAZOLE  IVPB 500 milliGRAM(s) IV Intermittent every 8 hours    MEDICATIONS  (PRN):  acetaminophen   IVPB .. 1000 milliGRAM(s) IV Intermittent once PRN Mild Pain (1 - 3)  dextrose Oral Gel 15 Gram(s) Oral once PRN Blood Glucose LESS THAN 70 milliGRAM(s)/deciliter  HYDROmorphone  Injectable 0.25 milliGRAM(s) IV Push every 3 hours PRN Moderate Pain (4 - 6)  HYDROmorphone  Injectable 0.5 milliGRAM(s) IV Push every 3 hours PRN Severe Pain (7 - 10)  ondansetron Injectable 4 milliGRAM(s) IV Push every 8 hours PRN Nausea and/or Vomiting      Vital Signs Last 24 Hrs  T(C): 36.3 (24 Aug 2023 14:00), Max: 37.7 (23 Aug 2023 21:55)  T(F): 97.3 (24 Aug 2023 14:00), Max: 99.8 (23 Aug 2023 21:55)  HR: 70 (24 Aug 2023 15:24) (68 - 89)  BP: 135/71 (24 Aug 2023 15:24) (132/63 - 178/83)  BP(mean): 97 (24 Aug 2023 15:24) (89 - 117)  RR: 18 (24 Aug 2023 15:24) (18 - 30)  SpO2: 96% (24 Aug 2023 15:24) (92% - 100%)    Parameters below as of 24 Aug 2023 15:24  Patient On (Oxygen Delivery Method): room air      Physical Exam:  General: NAD  HEENT: NC/AT, EOMI, PERRLA, normal hearing, no oral lesions, neck supple w/o LAD  Pulmonary: Nonlabored breathing, no respiratory distress, L sided basilar crackles  Cardiovascular: NSR   Abdominal: soft, non-distended and non-tender   Extremities: WWP, 5/5 strength x 4, no clubbing/cyanosis/edema  Neuro: No focal deficits      I&O's Summary    23 Aug 2023 07:01  -  24 Aug 2023 07:00  --------------------------------------------------------  IN: 460 mL / OUT: 2950 mL / NET: -2490 mL    24 Aug 2023 07:01  -  24 Aug 2023 16:20  --------------------------------------------------------  IN: 390 mL / OUT: 1000 mL / NET: -610 mL        LABS:                        11.3   9.78  )-----------( 384      ( 24 Aug 2023 05:30 )             36.8     08-24    135  |  103  |  6<L>  ----------------------------<  125<H>  4.2   |  23  |  0.83    Ca    9.0      24 Aug 2023 05:30  Phos  3.5     08-24  Mg     2.0     08-24    TPro  7.2  /  Alb  2.9<L>  /  TBili  1.3<H>  /  DBili  0.7<H>  /  AST  75<H>  /  ALT  117<H>  /  AlkPhos  244<H>  08-24      Urinalysis Basic - ( 24 Aug 2023 05:30 )    Color: x / Appearance: x / SG: x / pH: x  Gluc: 125 mg/dL / Ketone: x  / Bili: x / Urobili: x   Blood: x / Protein: x / Nitrite: x   Leuk Esterase: x / RBC: x / WBC x   Sq Epi: x / Non Sq Epi: x / Bacteria: x      CAPILLARY BLOOD GLUCOSE      POCT Blood Glucose.: 131 mg/dL (24 Aug 2023 11:23)  POCT Blood Glucose.: 109 mg/dL (24 Aug 2023 06:14)  POCT Blood Glucose.: 107 mg/dL (24 Aug 2023 00:12)  POCT Blood Glucose.: 114 mg/dL (23 Aug 2023 16:35)    LIVER FUNCTIONS - ( 24 Aug 2023 05:30 )  Alb: 2.9 g/dL / Pro: 7.2 g/dL / ALK PHOS: 244 U/L / ALT: 117 U/L / AST: 75 U/L / GGT: x             RADIOLOGY & ADDITIONAL STUDIES:

## 2023-08-24 NOTE — PHYSICAL THERAPY INITIAL EVALUATION ADULT - THERAPY FREQUENCY, PT EVAL
Indication: Lobar PNA, immunocompromised patient    Pre-op Dx: lobar PNA    Preop medication: GA with propofol initiated prior to procedure     Xray Findings: dense right lobar pneumonia     Findings:  Bronchoscope inserted through ETT. ETT noted to be in good position. Airway evaluation revealed [place]. CHRIS and LLL evaluation revealed erythematous mucosa and some purulent secretions in the left mainstem bronchus. No endobronchial lesions were identified to the subsegmental level and all bronchi were patent. RUL, RML, RLL revealed significant purulent secretions from RUL and RML. All airways were patent and no endobronchial lesions were identified. Bronchoscope then withdrawn from ETT. Minimal bleeding noted. Hemostasis was observed at conclusion of the procedure.     Specimens:  RUL BAL, Bronchial wash
2-3x/week

## 2023-08-24 NOTE — PHYSICAL THERAPY INITIAL EVALUATION ADULT - ADDITIONAL COMMENTS
As per pt, PTA she was mostly independent with functional mobility, required some assistance with ADLs, and IADLs. Pt uses a rollator for ambulation, also has a straight cane. Pt has a HHA 5d x 5h, when the aide is not there pt has help from niece / grandson. States when she is alone, she is able to manage well. Has a shower tub with a shower chair / grab bars, aide assists in bathing. Pt is always accompanied by HHA / family when leaving the apartment.

## 2023-08-24 NOTE — PROGRESS NOTE ADULT - ASSESSMENT
89 year old female with PMH of HTN, gout, presenting to the ED with 1 day of constant dull RUQ pain. Admitted for obstructive gallstone pancreatitis vs choledocholithiasis vs acute cholecystis now s/p ERCP w/ sphincterotomy and stent placement (8/22).     Plan:   - Encourage IS/OOB   - Low fat regular diet    - DVT ppx   - Step down from SICU   - PT     D/w chief resident and attending

## 2023-08-24 NOTE — PHYSICAL THERAPY INITIAL EVALUATION ADULT - GAIT DEVIATIONS NOTED, PT EVAL
decreased kimberly/decreased velocity of limb motion/decreased step length/decreased weight-shifting ability

## 2023-08-24 NOTE — PHYSICAL THERAPY INITIAL EVALUATION ADULT - IMPAIRMENTS CONTRIBUTING TO GAIT DEVIATIONS, PT EVAL
Requires intermittent VC's to bring RW closer to self however overall steady with good safety awareness/impaired balance/impaired postural control/decreased strength

## 2023-08-24 NOTE — PROGRESS NOTE ADULT - ASSESSMENT
89 year old female with PMH of HTN, gout, presenting to the ED with 1 day of constant dull RUQ pain. Admitted for obstructive gallstone pancreatitis vs choledocholithiasis vs acute cholecystis now s/p ERCP w/ sphincterotomy and stent placement (8/22).    Neuro: tylenol for pain, zofran for nausea  CV: MAP >65, restart metoprolol & amlodipine   Pulm: satting well on RA  GI: Reg low fat diet. s/p ERCP w/ sphincterotomy and stent placement  : voids. UA neg.  ID: CTX (8/22- ), flagyl (8/22- ), zosyn (8/22-8/22)  Endo: ISS  Heme/PPx: SQL, SCDs  Lines: PIVs  PT/OT: ordered.  Dispo: SDU

## 2023-08-25 ENCOUNTER — TRANSCRIPTION ENCOUNTER (OUTPATIENT)
Age: 88
End: 2023-08-25

## 2023-08-25 VITALS — TEMPERATURE: 98 F

## 2023-08-25 LAB
ALBUMIN SERPL ELPH-MCNC: 2.9 G/DL — LOW (ref 3.3–5)
ALP SERPL-CCNC: 217 U/L — HIGH (ref 40–120)
ALT FLD-CCNC: 77 U/L — HIGH (ref 10–45)
ANION GAP SERPL CALC-SCNC: 12 MMOL/L — SIGNIFICANT CHANGE UP (ref 5–17)
AST SERPL-CCNC: 41 U/L — HIGH (ref 10–40)
BILIRUB DIRECT SERPL-MCNC: 0.4 MG/DL — HIGH (ref 0–0.3)
BILIRUB INDIRECT FLD-MCNC: 0.5 MG/DL — SIGNIFICANT CHANGE UP (ref 0.2–1)
BILIRUB SERPL-MCNC: 0.9 MG/DL — SIGNIFICANT CHANGE UP (ref 0.2–1.2)
BUN SERPL-MCNC: 9 MG/DL — SIGNIFICANT CHANGE UP (ref 7–23)
CALCIUM SERPL-MCNC: 8.9 MG/DL — SIGNIFICANT CHANGE UP (ref 8.4–10.5)
CHLORIDE SERPL-SCNC: 105 MMOL/L — SIGNIFICANT CHANGE UP (ref 96–108)
CO2 SERPL-SCNC: 21 MMOL/L — LOW (ref 22–31)
CREAT SERPL-MCNC: 0.86 MG/DL — SIGNIFICANT CHANGE UP (ref 0.5–1.3)
EGFR: 63 ML/MIN/1.73M2 — SIGNIFICANT CHANGE UP
GLUCOSE SERPL-MCNC: 137 MG/DL — HIGH (ref 70–99)
HCT VFR BLD CALC: 34.4 % — LOW (ref 34.5–45)
HGB BLD-MCNC: 10.7 G/DL — LOW (ref 11.5–15.5)
MAGNESIUM SERPL-MCNC: 1.8 MG/DL — SIGNIFICANT CHANGE UP (ref 1.6–2.6)
MCHC RBC-ENTMCNC: 24.3 PG — LOW (ref 27–34)
MCHC RBC-ENTMCNC: 31.1 GM/DL — LOW (ref 32–36)
MCV RBC AUTO: 78.2 FL — LOW (ref 80–100)
NRBC # BLD: 0 /100 WBCS — SIGNIFICANT CHANGE UP (ref 0–0)
PHOSPHATE SERPL-MCNC: 3.8 MG/DL — SIGNIFICANT CHANGE UP (ref 2.5–4.5)
PLATELET # BLD AUTO: 419 K/UL — HIGH (ref 150–400)
POTASSIUM SERPL-MCNC: 3.7 MMOL/L — SIGNIFICANT CHANGE UP (ref 3.5–5.3)
POTASSIUM SERPL-SCNC: 3.7 MMOL/L — SIGNIFICANT CHANGE UP (ref 3.5–5.3)
PROT SERPL-MCNC: 7.1 G/DL — SIGNIFICANT CHANGE UP (ref 6–8.3)
RBC # BLD: 4.4 M/UL — SIGNIFICANT CHANGE UP (ref 3.8–5.2)
RBC # FLD: 21.9 % — HIGH (ref 10.3–14.5)
SODIUM SERPL-SCNC: 138 MMOL/L — SIGNIFICANT CHANGE UP (ref 135–145)
WBC # BLD: 9.39 K/UL — SIGNIFICANT CHANGE UP (ref 3.8–10.5)
WBC # FLD AUTO: 9.39 K/UL — SIGNIFICANT CHANGE UP (ref 3.8–10.5)

## 2023-08-25 PROCEDURE — 82247 BILIRUBIN TOTAL: CPT

## 2023-08-25 PROCEDURE — 83690 ASSAY OF LIPASE: CPT

## 2023-08-25 PROCEDURE — 93005 ELECTROCARDIOGRAM TRACING: CPT

## 2023-08-25 PROCEDURE — 81001 URINALYSIS AUTO W/SCOPE: CPT

## 2023-08-25 PROCEDURE — 82962 GLUCOSE BLOOD TEST: CPT

## 2023-08-25 PROCEDURE — 71045 X-RAY EXAM CHEST 1 VIEW: CPT

## 2023-08-25 PROCEDURE — 84100 ASSAY OF PHOSPHORUS: CPT

## 2023-08-25 PROCEDURE — 85610 PROTHROMBIN TIME: CPT

## 2023-08-25 PROCEDURE — 96375 TX/PRO/DX INJ NEW DRUG ADDON: CPT

## 2023-08-25 PROCEDURE — 86850 RBC ANTIBODY SCREEN: CPT

## 2023-08-25 PROCEDURE — C2625: CPT

## 2023-08-25 PROCEDURE — 86900 BLOOD TYPING SEROLOGIC ABO: CPT

## 2023-08-25 PROCEDURE — C1769: CPT

## 2023-08-25 PROCEDURE — 85730 THROMBOPLASTIN TIME PARTIAL: CPT

## 2023-08-25 PROCEDURE — 83735 ASSAY OF MAGNESIUM: CPT

## 2023-08-25 PROCEDURE — 97161 PT EVAL LOW COMPLEX 20 MIN: CPT

## 2023-08-25 PROCEDURE — 84484 ASSAY OF TROPONIN QUANT: CPT

## 2023-08-25 PROCEDURE — 80048 BASIC METABOLIC PNL TOTAL CA: CPT

## 2023-08-25 PROCEDURE — 99285 EMERGENCY DEPT VISIT HI MDM: CPT

## 2023-08-25 PROCEDURE — 80076 HEPATIC FUNCTION PANEL: CPT

## 2023-08-25 PROCEDURE — 74330 X-RAY BILE/PANC ENDOSCOPY: CPT

## 2023-08-25 PROCEDURE — 36415 COLL VENOUS BLD VENIPUNCTURE: CPT

## 2023-08-25 PROCEDURE — 76705 ECHO EXAM OF ABDOMEN: CPT

## 2023-08-25 PROCEDURE — 83036 HEMOGLOBIN GLYCOSYLATED A1C: CPT

## 2023-08-25 PROCEDURE — 96374 THER/PROPH/DIAG INJ IV PUSH: CPT

## 2023-08-25 PROCEDURE — 96376 TX/PRO/DX INJ SAME DRUG ADON: CPT

## 2023-08-25 PROCEDURE — 97116 GAIT TRAINING THERAPY: CPT

## 2023-08-25 PROCEDURE — 86901 BLOOD TYPING SEROLOGIC RH(D): CPT

## 2023-08-25 PROCEDURE — 74177 CT ABD & PELVIS W/CONTRAST: CPT | Mod: MA

## 2023-08-25 PROCEDURE — 85027 COMPLETE CBC AUTOMATED: CPT

## 2023-08-25 PROCEDURE — 82248 BILIRUBIN DIRECT: CPT

## 2023-08-25 PROCEDURE — 85025 COMPLETE CBC W/AUTO DIFF WBC: CPT

## 2023-08-25 PROCEDURE — 80053 COMPREHEN METABOLIC PANEL: CPT

## 2023-08-25 RX ORDER — LABETALOL HCL 100 MG
10 TABLET ORAL ONCE
Refills: 0 | Status: COMPLETED | OUTPATIENT
Start: 2023-08-25 | End: 2023-08-25

## 2023-08-25 RX ORDER — POTASSIUM CHLORIDE 20 MEQ
40 PACKET (EA) ORAL ONCE
Refills: 0 | Status: COMPLETED | OUTPATIENT
Start: 2023-08-25 | End: 2023-08-25

## 2023-08-25 RX ADMIN — Medication 50 MILLIGRAM(S): at 12:08

## 2023-08-25 RX ADMIN — AMLODIPINE BESYLATE 5 MILLIGRAM(S): 2.5 TABLET ORAL at 10:01

## 2023-08-25 RX ADMIN — Medication 100 MILLIGRAM(S): at 11:02

## 2023-08-25 RX ADMIN — Medication 100 MILLIGRAM(S): at 03:28

## 2023-08-25 RX ADMIN — Medication 10 MILLIGRAM(S): at 04:10

## 2023-08-25 RX ADMIN — ENOXAPARIN SODIUM 40 MILLIGRAM(S): 100 INJECTION SUBCUTANEOUS at 10:00

## 2023-08-25 RX ADMIN — CEFTRIAXONE 100 MILLIGRAM(S): 500 INJECTION, POWDER, FOR SOLUTION INTRAMUSCULAR; INTRAVENOUS at 10:00

## 2023-08-25 RX ADMIN — Medication 40 MILLIEQUIVALENT(S): at 10:01

## 2023-08-25 NOTE — DISCHARGE NOTE PROVIDER - NSDCFUADDINST_GEN_ALL_CORE_FT
Surgery Follow Up:  Please follow up with Dr. Titus in one week; you may call the office to make an appointment at your earliest convenience.    Gastroenterology Follow Up:  Please follow up with Dr. Burch in 2-3 weeks. An appointment has been made on 9/15/2023, please call the office to confirm your appointment time at your earliest convenience.    General Discharge Instructions:  Please resume all regular home medications unless specifically advised not to take a particular medication. Also, please take any new medications as prescribed.  Please get plenty of rest, continue to ambulate several times per day, and drink adequate amounts of fluids.   Please follow-up with your surgeon and Primary Care Provider (PCP) in 1-2 weeks.    Warning Signs:  Please call your doctor if you experience the following:  *You experience new chest pain, pressure, squeezing or tightness.  *New or worsening cough, shortness of breath, or wheeze.  *If you are vomiting and cannot keep down fluids or your medications.  *You are getting dehydrated due to continued vomiting, diarrhea, or other reasons. Signs of dehydration include dry mouth, rapid heartbeat, or feeling dizzy or faint when standing.  *You see blood or dark/black material when you vomit or have a bowel movement.  *You experience burning when you urinate, have blood in your urine, or experience a discharge.  *Your pain is not improving within 8-12 hours or is not gone within 24 hours. Call or return immediately if your pain is getting worse, changes location, or moves to your chest or back.  *You have shaking chills, or fever greater than 101.5 degrees Fahrenheit or 38 degrees Celsius.  *Any change in your symptoms, or any new symptoms that concern you.

## 2023-08-25 NOTE — DISCHARGE NOTE PROVIDER - NSDCHHATTENDCERT_GEN_ALL_CORE
My signature below certifies that the above stated patient is homebound and upon completion of the Face-To-Face encounter, has the need for intermittent skilled nursing, physical therapy and/or speech or occupational therapy services in their home for their current diagnosis as outlined in their initial plan of care. These services will continue to be monitored by myself or another physician. show

## 2023-08-25 NOTE — DISCHARGE NOTE PROVIDER - NSDCFUSCHEDAPPT_GEN_ALL_CORE_FT
Francisco J Burch  [facility]  GASTRO [address]  Scheduled Appointment: 09/15/2023

## 2023-08-25 NOTE — PROGRESS NOTE ADULT - SUBJECTIVE AND OBJECTIVE BOX
SUBJECTIVE: Pt seen and examined by chief resident. Pt is doing well, resting comfortably on bed. Denies abdominal pain. Diet tolerated. Had BM when she first got to the hospital. No nausea or vomiting. No complaints at this time.    Vital Signs Last 24 Hrs  T(C): 36.7 (25 Aug 2023 04:38), Max: 37 (24 Aug 2023 17:41)  T(F): 98.1 (25 Aug 2023 04:38), Max: 98.6 (24 Aug 2023 17:41)  HR: 70 (25 Aug 2023 04:40) (68 - 88)  BP: 143/76 (25 Aug 2023 04:40) (135/71 - 175/86)  BP(mean): 104 (25 Aug 2023 04:40) (97 - 119)  RR: 17 (25 Aug 2023 04:40) (17 - 22)  SpO2: 95% (25 Aug 2023 04:40) (95% - 100%)    Parameters below as of 25 Aug 2023 04:40  Patient On (Oxygen Delivery Method): room air        I&O's Summary    23 Aug 2023 07:01  -  24 Aug 2023 07:00  --------------------------------------------------------  IN: 460 mL / OUT: 2950 mL / NET: -2490 mL    24 Aug 2023 07:01  -  25 Aug 2023 06:59  --------------------------------------------------------  IN: 730 mL / OUT: 1350 mL / NET: -620 mL        Physical Exam:  General Appearance: Appears well, NAD  Pulmonary: Nonlabored breathing, no respiratory distress  Cardiovascular: NSR  Abdomen: Soft, nondisteded, nontender  Extremities: WWP, SCD's in place     LABS:                        10.7   9.39  )-----------( 419      ( 25 Aug 2023 06:50 )             34.4     08-24    135  |  103  |  6<L>  ----------------------------<  125<H>  4.2   |  23  |  0.83    Ca    9.0      24 Aug 2023 05:30  Phos  3.5     08-24  Mg     2.0     08-24    TPro  7.2  /  Alb  2.9<L>  /  TBili  1.3<H>  /  DBili  0.7<H>  /  AST  75<H>  /  ALT  117<H>  /  AlkPhos  244<H>  08-24      Urinalysis Basic - ( 24 Aug 2023 05:30 )    Color: x / Appearance: x / SG: x / pH: x  Gluc: 125 mg/dL / Ketone: x  / Bili: x / Urobili: x   Blood: x / Protein: x / Nitrite: x   Leuk Esterase: x / RBC: x / WBC x   Sq Epi: x / Non Sq Epi: x / Bacteria: x

## 2023-08-25 NOTE — PROGRESS NOTE ADULT - ASSESSMENT
89 year old female with PMH of HTN, gout, presenting to the ED with 1 day of constant dull RUQ pain. Admitted for obstructive gallstone pancreatitis vs choledocholithiasis vs acute cholecystis now s/p ERCP w/ sphincterotomy and stent placement (8/22).        Ceftriaxone, flagyll  SCDs/Lovenox  Low fat diet  dispo: home pt. with home assist

## 2023-08-25 NOTE — PROGRESS NOTE ADULT - REASON FOR ADMISSION
obstructive gallstone pancreatitis

## 2023-08-25 NOTE — DISCHARGE NOTE PROVIDER - CARE PROVIDER_API CALL
Jose Titus  Surgery  [address] [address]  Phone: [phone]  Fax: [phone]  Follow Up Time:     Francisco J Burch  Gastroenterology  [address]  [place] [address]  Phone: [phone]  Fax: [phone]  Follow Up Time:

## 2023-08-25 NOTE — DISCHARGE NOTE NURSING/CASE MANAGEMENT/SOCIAL WORK - PATIENT PORTAL LINK FT
You can access the FollowMyHealth Patient Portal offered by [facility] by registering at the following website: [email or web address]. By joining [facility]’s FollowMyHealth portal, you will also be able to view your health information using other applications (apps) compatible with our system.

## 2023-08-25 NOTE — DISCHARGE NOTE PROVIDER - HOSPITAL COURSE
89F PMH of HTN, gout, presented to the ED with constant dull RUQ pain which started as epigastric pain x 1 day associated with nausea and non bloody emesis. In ED, patient is afebrile, normotensive, HR of 80, LABS significant for WBC 16.04, Bili total 1.7, Alk 262, AST/ALT 95/134, Lipase 1479. CT abd/Pelvis showed distended gallbladder with sludge, no pericholecystic fluid, mild intrahepatic ductal dilation in both lobes, dilated common bile duct 2.1 cm, multiple hyperdense rounded structures most likely stones within CBD. RUQ US showed mild intra and extra hepatic biliary ductal dilation, CBD measuring 1.4cm, 1.5 cm shadowing rounded echogenic avascular structure, mild gallbladder thickening (0.4cm) with sludge, no cholelithiasis, no pericholecystic fluid, unable to access valdes sign as patient had analgesia. Pt admitted to SICU for close hemodynamic monitoring. On 8/22 pt underwent ERCP with sphincterotomy and stent placement. Pt improved clinically, LFTs downtrended, pain improved, WBC normalized. Pt was stepped down to telemetry, diet was advanced, and tolerated. On day of discharge pt was stable for dc home with home PT/home attending.

## 2023-09-15 ENCOUNTER — APPOINTMENT (OUTPATIENT)
Dept: GASTROENTEROLOGY | Facility: CLINIC | Age: 88
End: 2023-09-15

## 2023-11-28 NOTE — ED ADULT NURSE NOTE - GASTROINTESTINAL ASSESSMENT
Per Dr. Hamm- it appears pt had what appeared to be a large abscess in his femoral head/acetabulum during surgery. Surgery stated sent some intraop frozen samples and the pathologist said there way \" too many neutrophils to count\", and that it looked like a \"large abscess\".  Ortho placed an antibiotic spacer instead.     D/w Dr. Cannon>>Patient will be on IV vancomycin alone for now.  We have requested pharmacy to dose and monitor IV vanco.   WDL

## 2024-05-12 ENCOUNTER — INPATIENT (INPATIENT)
Facility: HOSPITAL | Age: 89
LOS: 2 days | Discharge: HOME HEALTH | DRG: 919 | End: 2024-05-15
Attending: INTERNAL MEDICINE | Admitting: INTERNAL MEDICINE
Payer: MEDICARE

## 2024-05-12 VITALS
HEART RATE: 60 BPM | SYSTOLIC BLOOD PRESSURE: 80 MMHG | WEIGHT: 179.9 LBS | DIASTOLIC BLOOD PRESSURE: 50 MMHG | RESPIRATION RATE: 20 BRPM | TEMPERATURE: 97 F | OXYGEN SATURATION: 100 %

## 2024-05-12 DIAGNOSIS — Z29.9 ENCOUNTER FOR PROPHYLACTIC MEASURES, UNSPECIFIED: ICD-10-CM

## 2024-05-12 DIAGNOSIS — T85.520A DISPLACEMENT OF BILE DUCT PROSTHESIS, INITIAL ENCOUNTER: ICD-10-CM

## 2024-05-12 DIAGNOSIS — M10.9 GOUT, UNSPECIFIED: ICD-10-CM

## 2024-05-12 DIAGNOSIS — A41.9 SEPSIS, UNSPECIFIED ORGANISM: ICD-10-CM

## 2024-05-12 DIAGNOSIS — Z98.49 CATARACT EXTRACTION STATUS, UNSPECIFIED EYE: Chronic | ICD-10-CM

## 2024-05-12 DIAGNOSIS — S82.892A OTHER FRACTURE OF LEFT LOWER LEG, INITIAL ENCOUNTER FOR CLOSED FRACTURE: Chronic | ICD-10-CM

## 2024-05-12 DIAGNOSIS — I10 ESSENTIAL (PRIMARY) HYPERTENSION: ICD-10-CM

## 2024-05-12 LAB
ALBUMIN SERPL ELPH-MCNC: 3.6 G/DL — SIGNIFICANT CHANGE UP (ref 3.3–5)
ALP SERPL-CCNC: 148 U/L — HIGH (ref 40–120)
ALT FLD-CCNC: 36 U/L — SIGNIFICANT CHANGE UP (ref 10–45)
ANION GAP SERPL CALC-SCNC: 16 MMOL/L — SIGNIFICANT CHANGE UP (ref 5–17)
AST SERPL-CCNC: SIGNIFICANT CHANGE UP (ref 10–40)
BASE EXCESS BLDV CALC-SCNC: -1.1 MMOL/L — SIGNIFICANT CHANGE UP (ref -2–3)
BASOPHILS # BLD AUTO: 0.02 K/UL — SIGNIFICANT CHANGE UP (ref 0–0.2)
BASOPHILS NFR BLD AUTO: 0.2 % — SIGNIFICANT CHANGE UP (ref 0–2)
BILIRUB DIRECT SERPL-MCNC: SIGNIFICANT CHANGE UP (ref 0–0.3)
BILIRUB INDIRECT FLD-MCNC: SIGNIFICANT CHANGE UP (ref 0.2–1)
BILIRUB SERPL-MCNC: 1 MG/DL — SIGNIFICANT CHANGE UP (ref 0.2–1.2)
BUN SERPL-MCNC: 16 MG/DL — SIGNIFICANT CHANGE UP (ref 7–23)
CA-I SERPL-SCNC: 1.15 MMOL/L — SIGNIFICANT CHANGE UP (ref 1.15–1.33)
CALCIUM SERPL-MCNC: 9.2 MG/DL — SIGNIFICANT CHANGE UP (ref 8.4–10.5)
CHLORIDE SERPL-SCNC: 99 MMOL/L — SIGNIFICANT CHANGE UP (ref 96–108)
CO2 BLDV-SCNC: 26.2 MMOL/L — HIGH (ref 22–26)
CO2 SERPL-SCNC: 20 MMOL/L — LOW (ref 22–31)
CREAT SERPL-MCNC: 1.06 MG/DL — SIGNIFICANT CHANGE UP (ref 0.5–1.3)
EGFR: 49 ML/MIN/1.73M2 — LOW
EOSINOPHIL # BLD AUTO: 0.08 K/UL — SIGNIFICANT CHANGE UP (ref 0–0.5)
EOSINOPHIL NFR BLD AUTO: 0.9 % — SIGNIFICANT CHANGE UP (ref 0–6)
GAS PNL BLDV: 132 MMOL/L — LOW (ref 136–145)
GAS PNL BLDV: SIGNIFICANT CHANGE UP
GLUCOSE SERPL-MCNC: 154 MG/DL — HIGH (ref 70–99)
HCO3 BLDV-SCNC: 25 MMOL/L — SIGNIFICANT CHANGE UP (ref 22–29)
HCT VFR BLD CALC: 41.2 % — SIGNIFICANT CHANGE UP (ref 34.5–45)
HGB BLD-MCNC: 13.3 G/DL — SIGNIFICANT CHANGE UP (ref 11.5–15.5)
IMM GRANULOCYTES NFR BLD AUTO: 0.5 % — SIGNIFICANT CHANGE UP (ref 0–0.9)
LACTATE SERPL-SCNC: 3.2 MMOL/L — HIGH (ref 0.5–2)
LACTATE SERPL-SCNC: 3.7 MMOL/L — HIGH (ref 0.5–2)
LACTATE SERPL-SCNC: 4.2 MMOL/L — CRITICAL HIGH (ref 0.5–2)
LIDOCAIN IGE QN: 18 U/L — SIGNIFICANT CHANGE UP (ref 7–60)
LYMPHOCYTES # BLD AUTO: 1.99 K/UL — SIGNIFICANT CHANGE UP (ref 1–3.3)
LYMPHOCYTES # BLD AUTO: 21.2 % — SIGNIFICANT CHANGE UP (ref 13–44)
MCHC RBC-ENTMCNC: 28.2 PG — SIGNIFICANT CHANGE UP (ref 27–34)
MCHC RBC-ENTMCNC: 32.3 GM/DL — SIGNIFICANT CHANGE UP (ref 32–36)
MCV RBC AUTO: 87.5 FL — SIGNIFICANT CHANGE UP (ref 80–100)
MONOCYTES # BLD AUTO: 0.24 K/UL — SIGNIFICANT CHANGE UP (ref 0–0.9)
MONOCYTES NFR BLD AUTO: 2.6 % — SIGNIFICANT CHANGE UP (ref 2–14)
NEUTROPHILS # BLD AUTO: 6.99 K/UL — SIGNIFICANT CHANGE UP (ref 1.8–7.4)
NEUTROPHILS NFR BLD AUTO: 74.6 % — SIGNIFICANT CHANGE UP (ref 43–77)
NRBC # BLD: 0 /100 WBCS — SIGNIFICANT CHANGE UP (ref 0–0)
PCO2 BLDV: 45 MMHG — HIGH (ref 39–42)
PH BLDV: 7.35 — SIGNIFICANT CHANGE UP (ref 7.32–7.43)
PLATELET # BLD AUTO: 381 K/UL — SIGNIFICANT CHANGE UP (ref 150–400)
PO2 BLDV: <33 MMHG — LOW (ref 25–45)
POTASSIUM BLDV-SCNC: 4 MMOL/L — SIGNIFICANT CHANGE UP (ref 3.5–5.1)
POTASSIUM SERPL-MCNC: 4.1 MMOL/L — SIGNIFICANT CHANGE UP (ref 3.5–5.3)
POTASSIUM SERPL-SCNC: 4.1 MMOL/L — SIGNIFICANT CHANGE UP (ref 3.5–5.3)
PROT SERPL-MCNC: 8 G/DL — SIGNIFICANT CHANGE UP (ref 6–8.3)
RBC # BLD: 4.71 M/UL — SIGNIFICANT CHANGE UP (ref 3.8–5.2)
RBC # FLD: 15.9 % — HIGH (ref 10.3–14.5)
SAO2 % BLDV: 27.7 % — LOW (ref 67–88)
SODIUM SERPL-SCNC: 135 MMOL/L — SIGNIFICANT CHANGE UP (ref 135–145)
TROPONIN T, HIGH SENSITIVITY RESULT: 10 NG/L — SIGNIFICANT CHANGE UP (ref 0–51)
WBC # BLD: 9.37 K/UL — SIGNIFICANT CHANGE UP (ref 3.8–10.5)
WBC # FLD AUTO: 9.37 K/UL — SIGNIFICANT CHANGE UP (ref 3.8–10.5)

## 2024-05-12 PROCEDURE — 74177 CT ABD & PELVIS W/CONTRAST: CPT | Mod: 26,MC

## 2024-05-12 PROCEDURE — 99222 1ST HOSP IP/OBS MODERATE 55: CPT

## 2024-05-12 PROCEDURE — 71045 X-RAY EXAM CHEST 1 VIEW: CPT | Mod: 26

## 2024-05-12 PROCEDURE — 99285 EMERGENCY DEPT VISIT HI MDM: CPT

## 2024-05-12 PROCEDURE — 76705 ECHO EXAM OF ABDOMEN: CPT | Mod: 26

## 2024-05-12 RX ORDER — ONDANSETRON 8 MG/1
4 TABLET, FILM COATED ORAL EVERY 8 HOURS
Refills: 0 | Status: DISCONTINUED | OUTPATIENT
Start: 2024-05-12 | End: 2024-05-15

## 2024-05-12 RX ORDER — ALLOPURINOL 300 MG
100 TABLET ORAL EVERY 24 HOURS
Refills: 0 | Status: DISCONTINUED | OUTPATIENT
Start: 2024-05-13 | End: 2024-05-15

## 2024-05-12 RX ORDER — ACETAMINOPHEN 500 MG
650 TABLET ORAL ONCE
Refills: 0 | Status: COMPLETED | OUTPATIENT
Start: 2024-05-12 | End: 2024-05-12

## 2024-05-12 RX ORDER — PIPERACILLIN AND TAZOBACTAM 4; .5 G/20ML; G/20ML
3.38 INJECTION, POWDER, LYOPHILIZED, FOR SOLUTION INTRAVENOUS ONCE
Refills: 0 | Status: COMPLETED | OUTPATIENT
Start: 2024-05-12 | End: 2024-05-12

## 2024-05-12 RX ORDER — VANCOMYCIN HCL 1 G
1200 VIAL (EA) INTRAVENOUS ONCE
Refills: 0 | Status: DISCONTINUED | OUTPATIENT
Start: 2024-05-12 | End: 2024-05-12

## 2024-05-12 RX ORDER — ACETAMINOPHEN 500 MG
1000 TABLET ORAL ONCE
Refills: 0 | Status: COMPLETED | OUTPATIENT
Start: 2024-05-12 | End: 2024-05-12

## 2024-05-12 RX ORDER — VANCOMYCIN HCL 1 G
1250 VIAL (EA) INTRAVENOUS ONCE
Refills: 0 | Status: COMPLETED | OUTPATIENT
Start: 2024-05-12 | End: 2024-05-12

## 2024-05-12 RX ORDER — SODIUM CHLORIDE 9 MG/ML
500 INJECTION INTRAMUSCULAR; INTRAVENOUS; SUBCUTANEOUS ONCE
Refills: 0 | Status: COMPLETED | OUTPATIENT
Start: 2024-05-12 | End: 2024-05-12

## 2024-05-12 RX ORDER — MORPHINE SULFATE 50 MG/1
4 CAPSULE, EXTENDED RELEASE ORAL ONCE
Refills: 0 | Status: DISCONTINUED | OUTPATIENT
Start: 2024-05-12 | End: 2024-05-12

## 2024-05-12 RX ORDER — SODIUM CHLORIDE 9 MG/ML
1000 INJECTION, SOLUTION INTRAVENOUS
Refills: 0 | Status: DISCONTINUED | OUTPATIENT
Start: 2024-05-12 | End: 2024-05-14

## 2024-05-12 RX ORDER — AMLODIPINE BESYLATE 2.5 MG/1
1 TABLET ORAL
Refills: 0 | DISCHARGE

## 2024-05-12 RX ORDER — SODIUM CHLORIDE 9 MG/ML
1000 INJECTION INTRAMUSCULAR; INTRAVENOUS; SUBCUTANEOUS ONCE
Refills: 0 | Status: COMPLETED | OUTPATIENT
Start: 2024-05-12 | End: 2024-05-12

## 2024-05-12 RX ORDER — AMLODIPINE BESYLATE 2.5 MG/1
1 TABLET ORAL
Qty: 0 | Refills: 0 | DISCHARGE

## 2024-05-12 RX ORDER — MORPHINE SULFATE 50 MG/1
2 CAPSULE, EXTENDED RELEASE ORAL EVERY 6 HOURS
Refills: 0 | Status: DISCONTINUED | OUTPATIENT
Start: 2024-05-12 | End: 2024-05-15

## 2024-05-12 RX ORDER — ACETAMINOPHEN 500 MG
650 TABLET ORAL EVERY 6 HOURS
Refills: 0 | Status: DISCONTINUED | OUTPATIENT
Start: 2024-05-12 | End: 2024-05-15

## 2024-05-12 RX ORDER — IOHEXOL 300 MG/ML
30 INJECTION, SOLUTION INTRAVENOUS ONCE
Refills: 0 | Status: COMPLETED | OUTPATIENT
Start: 2024-05-12 | End: 2024-05-12

## 2024-05-12 RX ADMIN — Medication 1000 MILLIGRAM(S): at 23:15

## 2024-05-12 RX ADMIN — SODIUM CHLORIDE 80 MILLILITER(S): 9 INJECTION, SOLUTION INTRAVENOUS at 22:59

## 2024-05-12 RX ADMIN — PIPERACILLIN AND TAZOBACTAM 200 GRAM(S): 4; .5 INJECTION, POWDER, LYOPHILIZED, FOR SOLUTION INTRAVENOUS at 17:49

## 2024-05-12 RX ADMIN — PIPERACILLIN AND TAZOBACTAM 25 GRAM(S): 4; .5 INJECTION, POWDER, LYOPHILIZED, FOR SOLUTION INTRAVENOUS at 23:51

## 2024-05-12 RX ADMIN — Medication 650 MILLIGRAM(S): at 17:49

## 2024-05-12 RX ADMIN — MORPHINE SULFATE 4 MILLIGRAM(S): 50 CAPSULE, EXTENDED RELEASE ORAL at 16:55

## 2024-05-12 RX ADMIN — Medication 166.67 MILLIGRAM(S): at 18:24

## 2024-05-12 RX ADMIN — Medication 400 MILLIGRAM(S): at 22:59

## 2024-05-12 RX ADMIN — ONDANSETRON 4 MILLIGRAM(S): 8 TABLET, FILM COATED ORAL at 22:59

## 2024-05-12 RX ADMIN — SODIUM CHLORIDE 1000 MILLILITER(S): 9 INJECTION INTRAMUSCULAR; INTRAVENOUS; SUBCUTANEOUS at 17:04

## 2024-05-12 RX ADMIN — SODIUM CHLORIDE 500 MILLILITER(S): 9 INJECTION INTRAMUSCULAR; INTRAVENOUS; SUBCUTANEOUS at 16:55

## 2024-05-12 RX ADMIN — IOHEXOL 30 MILLILITER(S): 300 INJECTION, SOLUTION INTRAVENOUS at 16:55

## 2024-05-12 NOTE — H&P ADULT - HISTORY OF PRESENT ILLNESS
89F PMH HTN, gout, hx of obstructive gallstone pancreatitis 8/2023 s/p ERCP with sphincterotomy and biliary stent placement presenting to ED with abd pain and nausea x1 day. History obtained from granddaughter at bedside who reports that she went to go see patient for mothers day today and when she got there patient complaining of abdominal pain, nausea and SOB that started around today at 11am. Granddaughter took her grandmothers BP which was high so gave her her home BP medication and some alkaselzer however symptoms became progressively worse so she called EMS. Patient denies any fevers/chills at home, headache, chest pain, vomiting, diarrhea, constipation, dysuria/hemar 89F PMH HTN, gout, hx of obstructive gallstone pancreatitis 8/2023 s/p ERCP with sphincterotomy and biliary stent placement presenting to ED with abd pain and nausea x1 day. History obtained from granddaughter at bedside who reports that she went to go see patient for mothers day today and when she got there patient complaining of abdominal pain, nausea and SOB that started around today at 11am. Granddaughter took her grandmothers BP which was high so gave her her home BP medication and some alkaselzer however symptoms became progressively worse so she called EMS. Reports the pain is localized to mid-upper and RUQ of abdomen, non-radiating. No vomiting. Patient denies any fevers/chills at home, headache, chest pain, vomiting, diarrhea, constipation, dysuria/hematuria, urinary problems, extremity pain or swelling. LBM today x2, normal and soft, no blood. Has been urinating normally. Says she has not followed up with GI or had any abdominal procedures done since her stent placement in August.     In the ED:  Initial vitals: Temp 97.4F (oral), 100.9F (rectal), HR 60->117, BP 80/50->149/90, RR 20, O2 100% RA  Labs significant for lactate 3.2->4.2->3.7, bicarb 20, alk phos 148, lipase wnl, trops negative   EKG 1st degree AV block  CT A/P with IV and PO contrast: CBD stent migrated into the lower CBD. There is moderate biliary dilatation. No pneumobilia. Choledocholithiasis again noted.  RUQ US: Dilatation of the gallbladder and intrahepatic and extrahepatic biliary ducts is redemonstrated. Questionable stone within the gallbladder neck. Borderline gallbladder wall thickening. No pericholecystic fluid or sonographic Tatum sign. Findings are unlikely to represent acute cholecystitis due to lack of sonographic Tatum sign. Consider further evaluation with nuclear hepatobiliary scan as clinically warranted.  Interventions: tylenol 650mg PO x1, morphine 4mg IVP x1, zosyn 3.375mg IV x1, vanc 1250mg IV x1, 1.5L NS

## 2024-05-12 NOTE — H&P ADULT - NSICDXPASTMEDICALHX_GEN_ALL_CORE_FT
PAST MEDICAL HISTORY:  Blindness of right eye     Cataract L eye, s/p surgery    Gallstones     Gout     Heart murmur     HTN (hypertension)

## 2024-05-12 NOTE — H&P ADULT - NSHPSOCIALHISTORY_GEN_ALL_CORE
Lives at home alone, has HHA M-F   Ambulates with cane and walker  Non-smoker  Denies alcohol or drug use

## 2024-05-12 NOTE — ED PROVIDER NOTE - CLINICAL SUMMARY MEDICAL DECISION MAKING FREE TEXT BOX
Concern for abdominal pain of unclear etiology, given age of patient and tenderness will evaluate for acute intra-abdominal process ?sbo ?perforation ?appendicitis ?diverticulitis ?intussusception ?volvulus via CT, also assess for hepatobiliary disease and pancreatitis on labs and US.  Will treat pain.  Initial hypotensive BP reading likely spurious given rapid spontaneous improvement, however will monitor.  R/o gross metabolic abnormality ?hypoglycemia ?flaquito ?hypernatremia ?severe anemia complicating presentation.

## 2024-05-12 NOTE — ED ADULT NURSE NOTE - OBJECTIVE STATEMENT
Pt A&ox4 and able to speak in complete sentences. Pt breathing even and unlabored with equal chest rise and fall. Pt arrived d/t worsening upper abd pain and sob over the past day. Pt endorsed nausea and vomiting. Pt found to be hypotensive in triage and upgraded to MD Frances. Pt diaphoretic. Pt denies cp, lower back pain, fevers, lightheadedness, dizziness.

## 2024-05-12 NOTE — ED ADULT NURSE NOTE - NSFALLHARMRISKINTERV_ED_ALL_ED
Assistance OOB with selected safe patient handling equipment if applicable/Assistance with ambulation/Communicate risk of Fall with Harm to all staff, patient, and family/Monitor gait and stability/Provide patient with walking aids/Provide visual cue: red socks, yellow wristband, yellow gown, etc/Reinforce activity limits and safety measures with patient and family/Use of alarms - bed, stretcher, chair and/or video monitoring/Bed in lowest position, wheels locked, appropriate side rails in place/Call bell, personal items and telephone in reach/Instruct patient to call for assistance before getting out of bed/chair/stretcher/Non-slip footwear applied when patient is off stretcher/[place] to call system/Physically safe environment - no spills, clutter or unnecessary equipment/Purposeful Proactive Rounding/Room/bathroom lighting operational, light cord in reach

## 2024-05-12 NOTE — ED PROVIDER NOTE - OBJECTIVE STATEMENT
As per patient and granddaughter, 89F with HTN and arthritis, also with hx ?cholecystitis ?biliary colic s/p biliary stent, now with severe constant RUQ abdominal pain since 1pm today, no vomiting/diarrhea/fever.  Triaged as upgrade due to hypotension 80/50, however on repeat measurement without intervention improved to reassuring range.

## 2024-05-12 NOTE — H&P ADULT - PROBLEM SELECTOR PLAN 3
History of HTN on home toprol XL 50mg qd and amlodipine 5mg vs 10mg qd (pervious admission med rec with 5mg however surescripts with prescriptions for 10mg- patient does not know dose)  -formal med rec in AM   -hold home BP meds in setting of sepsis   -monitor BPs

## 2024-05-12 NOTE — ED ADULT TRIAGE NOTE - CHIEF COMPLAINT QUOTE
Patient PMH HTN to ED via EMS c/o upper abdominal pain, n/v/d and sob x 1 day. Per EMS patient hypertensive on scene. Hypotensive bilateral upper extremities, diaphoretic, AAOX4.

## 2024-05-12 NOTE — ED ADULT NURSE REASSESSMENT NOTE - NS ED NURSE REASSESS COMMENT FT1
Pt endorsed pain controlled. Morphine effective. Pt breathing labored and yelping. When inquired, pt endorsed she is yelping from how cold she is. Endorsed to MD Frances. pt shivering and rectal done. temperature 100.9f. pt endorsed residual pain to tip of stomach. MD Frances notified and aware about HR reaching 130s.

## 2024-05-12 NOTE — H&P ADULT - ASSESSMENT
89F PMH HTN, gout, hx of obstructive gallstone pancreatitis 8/2023 s/p ERCP with sphincterotomy and biliary stent placement presenting to ED with abd pain and nausea x1 day, found to have severe sepsis and migration of prior CBD stent, admitted for management and likely ERCP. 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

## 2024-05-12 NOTE — H&P ADULT - ATTENDING COMMENTS
#Severe sepsis: p/w fever and upper abd pain. Hx of recent ERCP with sphincterotomy and biliary stent placement. Abd US + moderate biliary dilatation, w/ cholelithiasis noted at GB neck, no evidence of acute raoul. +mild ruq tenderness, no rebound/murphys. Total bili/ALT wnl, rest of LFTs hemolyzed. F/up repeat and cont to trend, trend lactate. c/w zosyn for possible cholangitis. Maintence fluids. JAMARCUS. F/up blood cx.    #r/o cholangitis: Plan as above. NPO for possible ERCP. F/up Gi recs. SAEs

## 2024-05-12 NOTE — H&P ADULT - NSHPPHYSICALEXAM_GEN_ALL_CORE
VITALS:   T(C): 36.5 (05-12-24 @ 20:50), Max: 38.3 (05-12-24 @ 17:14)  HR: 89 (05-12-24 @ 20:50) (60 - 117)  BP: 120/67 (05-12-24 @ 20:50) (80/50 - 149/90)  RR: 18 (05-12-24 @ 20:50) (18 - 20)  SpO2: 95% (05-12-24 @ 20:50) (94% - 100%)    GENERAL: NAD, lying in bed comfortably   HEAD:  Atraumatic, normocephalic  EYES: conjunctiva and sclera clear  ENT: Moist mucous membranes  NECK: Supple  HEART: Regular rate and rhythm, no murmurs, rubs, or gallops  LUNGS: Unlabored respirations.  Clear to auscultation bilaterally, no crackles, wheezing, or rhonchi  ABDOMEN: Soft, +TTP epigastric and RUQ, no lower quadrant tenderness, no rebound or guarding  EXTREMITIES: 2+ peripheral pulses bilaterally. No clubbing, cyanosis, or edema  NERVOUS SYSTEM:  A&Ox3, no focal deficits   SKIN: No rashes or lesions

## 2024-05-12 NOTE — H&P ADULT - PROBLEM SELECTOR PLAN 1
Patient presenting with fever, tachycardia, hypotension and elevated lactate in setting of acute onset abdominal pain and nausea. CT A/P showing prior CBD stent migrated into the lower CBD with moderate biliary dilatation. Likely intra-abdominal source as below. Lipase negative.  -s/p zosyn 3.375mg IV x1, vanc 1250mg IV x1, 1.5L NS in ED   -c/w zosyn vs start ceftriaxone and flagyl to cover for intra-abdominal source   -GI consult in AM  -f/u BCs  -trend lactate to clear  -tylenol prn fevers Patient presenting with fever, tachycardia, hypotension and elevated lactate in setting of acute onset abdominal pain and nausea. CT A/P showing prior CBD stent migrated into the lower CBD with moderate biliary dilatation. Likely intra-abdominal source as below. Lipase negative.  -s/p zosyn 3.375mg IV x1, vanc 1250mg IV x1, 1.5L NS in ED   -c/w zosyn to cover for intra-abdominal source   -GI consult in AM  -f/u BCs  -f/u UA/UCs   -trend lactate to clear  -tylenol prn fevers

## 2024-05-12 NOTE — ED PROVIDER NOTE - PROGRESS NOTE DETAILS
Patient during ED evaluation with fever, tachycardia, and elevated lactate, empirically treated with broad spectrum antibiotics for suspected acute intraabdominal process.    CT and US imaging of abdomen surprisingly without surgical emergency, but instead with stent migration.  Patient's pain improved, vitals improved s/p symptomatic management, fluids, and antibiotics.  Lactate persistently elevated.  Discussed case with GI team, will need admission for infectious workup and possible stent exchange, GI team will evaluate patient tomorrow morning.  Will admit for further workup and management.

## 2024-05-12 NOTE — CHART NOTE - NSCHARTNOTEFT_GEN_A_CORE
GI consulted for choledocholithiasis and migrated biliary stent.     89F with PMH HTN, gout, hx of obstructive gallstone pancreatitis (8/2023 s/p ERCP with sphincterotomy and biliary stent placement), presenting to ED with abd pain and nausea x1 day, found to have severe sepsis and migration of prior CBD stent, admitted for IV abx and ERCP.    In the ED hemodynamically stable but febrile to 100.9, HR now 81, /70, RR 18, saturating 97% on RA.     Per ED, patient is well-appearing overall but does complain of abdominal pain.     Labs significant for:   WBC 9.37, H/H 13.3/41.2, plt 381, lactate 3.2 --> 4.2 --> 3.7, electrolytes WNL, Cr 1.06, total bili 1.0, alk phos 148, AST/ALT hemolyzed/36    CT abdomen/plevis:   - CBD stent migrated into the lower CBD.   - There is moderate biliary dilatation.   - No pneumobilia. Choledocholithiasis again noted.    RUQ US:   - Dilatation of the gallbladder and intrahepatic and extrahepatic biliary ducts is redemonstrated.  - Questionable stone within the gallbladder neck. Borderline gallbladder wall thickening. No pericholecystic fluid or sonographic   Tatum sign. Findings are unlikely to represent acute cholecystitis due to lack of sonographic Tatum sign.   - Consider further evaluation with nuclear hepatobiliary scan as clinically warranted.    Prior ERCP (Aug 2023):   - The duodenoscope was passed into the second portion of the duodenum.   - Food debris was seen in the stomach.   - The major papilla was identified and appeared to be slightly bulging.   - Using a sphincterotome, the bile duct was readily cannulated and a wire was passed into the bile duct, the sphincterotome was then passed into the duct and a cholangiogram was produced by injecting contrast.    - Duct was moderately dilated with multiple filling defects noted.   - A small sphincterotomy was performed without complications.   - Using the previously placed biliary access wire, a 10 Fr 5 cm straight stent with internal and external flaps was placed into the bile duct.     Patient was supposed to follow-up with Dr. Burch for ERCP with stent exchange in 6-8 weeks but was loss to follow-up.     Recommendations:   - continue Zosyn and LR IVF   - repeat CBC, CMP, INR, and lactate and continue to trend   - NPO except medications after midnight tonight   - plan for ERCP with stent exchange tomorrow  - f/u UA, CXR, and blood cultures for fever of 100.9   - Tylenol for pain control     Case discussed with Dr. Burch. GI Team will formally see as a consult tomorrow.     Nedra Centeno D.O.   Gastroenterology Fellow  Weekday 7am-5pm Pager: [phone]  Weeknights/Weekend/Holiday Coverage: Please call the  for contact info GI consulted for choledocholithiasis and migrated biliary stent.     89F with PMH HTN, gout, hx of obstructive gallstone pancreatitis (8/2023 s/p ERCP with sphincterotomy and biliary stent placement), presenting to ED with abd pain and nausea x1 day, found to have severe sepsis and migration of prior CBD stent, admitted for IV abx and ERCP.    In the ED hemodynamically stable but febrile to 100.9, HR now 81, /70, RR 18, saturating 97% on RA.     Per ED, patient is well-appearing overall but does complain of abdominal pain.     Labs significant for:   WBC 9.37, H/H 13.3/41.2, plt 381, lactate 3.2 --> 4.2 --> 3.7, electrolytes WNL, Cr 1.06, total bili 1.0, alk phos 148, AST/ALT hemolyzed/36    CT abdomen/plevis:   - CBD stent migrated into the lower CBD.   - There is moderate biliary dilatation.   - No pneumobilia. Choledocholithiasis again noted.    RUQ US:   - Dilatation of the gallbladder and intrahepatic and extrahepatic biliary ducts is redemonstrated.  - Questionable stone within the gallbladder neck. Borderline gallbladder wall thickening. No pericholecystic fluid or sonographic   Tatum sign. Findings are unlikely to represent acute cholecystitis due to lack of sonographic Tatum sign.   - Consider further evaluation with nuclear hepatobiliary scan as clinically warranted.    Prior ERCP (Aug 2023):   - The duodenoscope was passed into the second portion of the duodenum.   - Food debris was seen in the stomach.   - The major papilla was identified and appeared to be slightly bulging.   - Using a sphincterotome, the bile duct was readily cannulated and a wire was passed into the bile duct, the sphincterotome was then passed into the duct and a cholangiogram was produced by injecting contrast.    - Duct was moderately dilated with multiple filling defects noted.   - A small sphincterotomy was performed without complications.   - Using the previously placed biliary access wire, a 10 Fr 5 cm straight stent with internal and external flaps was placed into the bile duct.     Patient was supposed to follow-up with Dr. Burch for ERCP with stent exchange in 6-8 weeks but was loss to follow-up.     Recommendations:   - continue Zosyn and LR IVF   - repeat CBC, CMP, INR, and lactate and continue to trend   - NPO except medications after midnight tonight   - plan for ERCP with stent exchange tomorrow  - f/u UA, CXR, and blood cultures x2 for fever of 100.9   - Tylenol for pain control     Case discussed with Dr. Burch. GI Team will formally see as a consult tomorrow.     Nedra Centeno D.O.   Gastroenterology Fellow  Weekday 7am-5pm Pager: [phone]  Weeknights/Weekend/Holiday Coverage: Please call the  for contact info

## 2024-05-12 NOTE — H&P ADULT - PROBLEM SELECTOR PLAN 5
F: s/p 1.5L NS in ED  E: replete as needed  N: NPO  DVT ppx: holding for procedure   FULL CODE- confirmed with patient and granddaughter   Dispo: [facility]

## 2024-05-12 NOTE — ED PROVIDER NOTE - PHYSICAL EXAMINATION
General: comfortable, resting in ED  HEENT: atraumatic, no eye erythema or discharge  Pulm: no cyanosis, no added work of breathing  Cardiac: no pallor, intact peripheral pulse  GI: no abdominal distension, diffuse tenderness to light palpation in all 4 quadrants  Neuro: alert, conversant  Psych: neutral affect, cooperative  Msk: no gross deformity or instability  Skin: no erythema or rash

## 2024-05-12 NOTE — H&P ADULT - PROBLEM SELECTOR PLAN 2
Patient with previous admission 8/2023 for obstructive gallstone pancreatitis, received ERCP with sphincterotomy and biliary stent placement however never followed up with GI outpatient for stent replacement. Now presenting with acute onset nausea and abdominal pain with imaging showing prior CBD stent migration.   -lipase negative   -CT A/P with IV and PO contrast: CBD stent migrated into the lower CBD. There is moderate biliary dilatation. No pneumobilia. Choledocholithiasis again noted.  -RUQ US: Dilatation of the gallbladder and intrahepatic and extrahepatic biliary ducts is redemonstrated. Questionable stone within the gallbladder neck. Borderline gallbladder wall thickening. No pericholecystic fluid or sonographic Tatum sign. Findings are unlikely to represent acute cholecystitis due to lack of sonographic Tatum sign. Consider further evaluation with nuclear hepatobiliary scan as clinically warranted.  -NPO   -GI consult in AM for ERCP Patient with previous admission 8/2023 for obstructive gallstone pancreatitis, received ERCP with sphincterotomy and biliary stent placement however never followed up with GI outpatient for stent replacement. Now presenting with acute onset nausea and abdominal pain with imaging showing prior CBD stent migration.   -lipase negative   -CT A/P with IV and PO contrast: CBD stent migrated into the lower CBD. There is moderate biliary dilatation. No pneumobilia. Choledocholithiasis again noted.  -RUQ US: Dilatation of the gallbladder and intrahepatic and extrahepatic biliary ducts is redemonstrated. Questionable stone within the gallbladder neck. Borderline gallbladder wall thickening. No pericholecystic fluid or sonographic Tatum sign. Findings are unlikely to represent acute cholecystitis due to lack of sonographic Tatum sign. Consider further evaluation with nuclear hepatobiliary scan as clinically warranted.  -NPO   -GI consult in AM for ERCP  -zofran prn nausea/vomiting Patient with previous admission 8/2023 for obstructive gallstone pancreatitis, received ERCP with sphincterotomy and biliary stent placement however never followed up with GI outpatient for stent exchange. Now presenting with acute onset nausea and abdominal pain with imaging showing prior CBD stent migration.   -lipase negative   -CT A/P with IV and PO contrast: CBD stent migrated into the lower CBD. There is moderate biliary dilatation. No pneumobilia. Choledocholithiasis again noted.  -RUQ US: Dilatation of the gallbladder and intrahepatic and extrahepatic biliary ducts is redemonstrated. Questionable stone within the gallbladder neck. Borderline gallbladder wall thickening. No pericholecystic fluid or sonographic Tatum sign. Findings are unlikely to represent acute cholecystitis due to lack of sonographic Tatum sign. Consider further evaluation with nuclear hepatobiliary scan as clinically warranted.  -NPO   -GI consult in AM for ERCP  -zofran prn nausea/vomiting

## 2024-05-12 NOTE — H&P ADULT - NSHPLABSRESULTS_GEN_ALL_CORE
.  LABS:                         13.3   9.37  )-----------( 381      ( 12 May 2024 16:25 )             41.2     05-12    135  |  99  |  16  ----------------------------<  154<H>  4.1   |  20<L>  |  1.06    Ca    9.2      12 May 2024 16:25    TPro  8.0  /  Alb  3.6  /  TBili  1.0  /  DBili  See Note  /  AST  See Note  /  ALT  36  /  AlkPhos  148<H>  05-12      Urinalysis Basic - ( 12 May 2024 16:25 )    Color: x / Appearance: x / SG: x / pH: x  Gluc: 154 mg/dL / Ketone: x  / Bili: x / Urobili: x   Blood: x / Protein: x / Nitrite: x   Leuk Esterase: x / RBC: x / WBC x   Sq Epi: x / Non Sq Epi: x / Bacteria: x        Lactate, Blood: 3.7 mmol/L (05-12 @ 20:56)  Lactate, Blood: 4.2 mmol/L (05-12 @ 18:20)  Lactate, Blood: 3.2 mmol/L (05-12 @ 16:25)      RADIOLOGY, EKG & ADDITIONAL TESTS: Reviewed.

## 2024-05-13 ENCOUNTER — TRANSCRIPTION ENCOUNTER (OUTPATIENT)
Age: 89
End: 2024-05-13

## 2024-05-13 LAB
ALBUMIN SERPL ELPH-MCNC: 3.2 G/DL — LOW (ref 3.3–5)
ALP SERPL-CCNC: 167 U/L — HIGH (ref 40–120)
ALT FLD-CCNC: 175 U/L — HIGH (ref 10–45)
ANION GAP SERPL CALC-SCNC: 15 MMOL/L — SIGNIFICANT CHANGE UP (ref 5–17)
ANISOCYTOSIS BLD QL: SLIGHT — SIGNIFICANT CHANGE UP
APPEARANCE UR: CLEAR — SIGNIFICANT CHANGE UP
APTT BLD: 27.2 SEC — SIGNIFICANT CHANGE UP (ref 24.5–35.6)
AST SERPL-CCNC: 192 U/L — HIGH (ref 10–40)
BASOPHILS # BLD AUTO: 0 K/UL — SIGNIFICANT CHANGE UP (ref 0–0.2)
BASOPHILS NFR BLD AUTO: 0 % — SIGNIFICANT CHANGE UP (ref 0–2)
BILIRUB DIRECT SERPL-MCNC: 3.1 MG/DL — HIGH (ref 0–0.3)
BILIRUB INDIRECT FLD-MCNC: 0.6 MG/DL — SIGNIFICANT CHANGE UP (ref 0.2–1)
BILIRUB SERPL-MCNC: 3.7 MG/DL — HIGH (ref 0.2–1.2)
BILIRUB SERPL-MCNC: 3.7 MG/DL — HIGH (ref 0.2–1.2)
BILIRUB UR-MCNC: NEGATIVE — SIGNIFICANT CHANGE UP
BLD GP AB SCN SERPL QL: NEGATIVE — SIGNIFICANT CHANGE UP
BUN SERPL-MCNC: 17 MG/DL — SIGNIFICANT CHANGE UP (ref 7–23)
BURR CELLS BLD QL SMEAR: PRESENT — SIGNIFICANT CHANGE UP
CALCIUM SERPL-MCNC: 8.6 MG/DL — SIGNIFICANT CHANGE UP (ref 8.4–10.5)
CHLORIDE SERPL-SCNC: 101 MMOL/L — SIGNIFICANT CHANGE UP (ref 96–108)
CO2 SERPL-SCNC: 19 MMOL/L — LOW (ref 22–31)
COLOR SPEC: SIGNIFICANT CHANGE UP
CREAT SERPL-MCNC: 1.13 MG/DL — SIGNIFICANT CHANGE UP (ref 0.5–1.3)
DIFF PNL FLD: NEGATIVE — SIGNIFICANT CHANGE UP
E COLI DNA BLD POS QL NAA+NON-PROBE: SIGNIFICANT CHANGE UP
EGFR: 45 ML/MIN/1.73M2 — LOW
EOSINOPHIL # BLD AUTO: 0 K/UL — SIGNIFICANT CHANGE UP (ref 0–0.5)
EOSINOPHIL NFR BLD AUTO: 0 % — SIGNIFICANT CHANGE UP (ref 0–6)
GLUCOSE SERPL-MCNC: 135 MG/DL — HIGH (ref 70–99)
GLUCOSE UR QL: 100 MG/DL
GRAM STN FLD: ABNORMAL
HCT VFR BLD CALC: 39.5 % — SIGNIFICANT CHANGE UP (ref 34.5–45)
HGB BLD-MCNC: 12.5 G/DL — SIGNIFICANT CHANGE UP (ref 11.5–15.5)
INR BLD: 1.2 — HIGH (ref 0.85–1.18)
KETONES UR-MCNC: NEGATIVE MG/DL — SIGNIFICANT CHANGE UP
LACTATE SERPL-SCNC: 3.3 MMOL/L — HIGH (ref 0.5–2)
LEUKOCYTE ESTERASE UR-ACNC: NEGATIVE — SIGNIFICANT CHANGE UP
LYMPHOCYTES # BLD AUTO: 0.28 K/UL — LOW (ref 1–3.3)
LYMPHOCYTES # BLD AUTO: 0.9 % — LOW (ref 13–44)
MAGNESIUM SERPL-MCNC: 1.8 MG/DL — SIGNIFICANT CHANGE UP (ref 1.6–2.6)
MANUAL SMEAR VERIFICATION: SIGNIFICANT CHANGE UP
MCHC RBC-ENTMCNC: 28.9 PG — SIGNIFICANT CHANGE UP (ref 27–34)
MCHC RBC-ENTMCNC: 31.6 GM/DL — LOW (ref 32–36)
MCV RBC AUTO: 91.2 FL — SIGNIFICANT CHANGE UP (ref 80–100)
METHOD TYPE: SIGNIFICANT CHANGE UP
MONOCYTES # BLD AUTO: 1.61 K/UL — HIGH (ref 0–0.9)
MONOCYTES NFR BLD AUTO: 5.2 % — SIGNIFICANT CHANGE UP (ref 2–14)
NEUTROPHILS # BLD AUTO: 29.13 K/UL — HIGH (ref 1.8–7.4)
NEUTROPHILS NFR BLD AUTO: 93 % — HIGH (ref 43–77)
NEUTS BAND # BLD: 0.9 % — SIGNIFICANT CHANGE UP (ref 0–8)
NITRITE UR-MCNC: NEGATIVE — SIGNIFICANT CHANGE UP
OVALOCYTES BLD QL SMEAR: SLIGHT — SIGNIFICANT CHANGE UP
PH UR: 6 — SIGNIFICANT CHANGE UP (ref 5–8)
PHOSPHATE SERPL-MCNC: 5.2 MG/DL — HIGH (ref 2.5–4.5)
PLAT MORPH BLD: NORMAL — SIGNIFICANT CHANGE UP
PLATELET # BLD AUTO: 325 K/UL — SIGNIFICANT CHANGE UP (ref 150–400)
POIKILOCYTOSIS BLD QL AUTO: SIGNIFICANT CHANGE UP
POLYCHROMASIA BLD QL SMEAR: SLIGHT — SIGNIFICANT CHANGE UP
POTASSIUM SERPL-MCNC: 4.5 MMOL/L — SIGNIFICANT CHANGE UP (ref 3.5–5.3)
POTASSIUM SERPL-SCNC: 4.5 MMOL/L — SIGNIFICANT CHANGE UP (ref 3.5–5.3)
PROT SERPL-MCNC: 6.9 G/DL — SIGNIFICANT CHANGE UP (ref 6–8.3)
PROT UR-MCNC: NEGATIVE MG/DL — SIGNIFICANT CHANGE UP
PROTHROM AB SERPL-ACNC: 13.6 SEC — HIGH (ref 9.5–13)
RBC # BLD: 4.33 M/UL — SIGNIFICANT CHANGE UP (ref 3.8–5.2)
RBC # FLD: 16.2 % — HIGH (ref 10.3–14.5)
RBC BLD AUTO: ABNORMAL
RH IG SCN BLD-IMP: POSITIVE — SIGNIFICANT CHANGE UP
SODIUM SERPL-SCNC: 135 MMOL/L — SIGNIFICANT CHANGE UP (ref 135–145)
SP GR SPEC: >1.03 — HIGH (ref 1–1.03)
SPECIMEN SOURCE: SIGNIFICANT CHANGE UP
SPECIMEN SOURCE: SIGNIFICANT CHANGE UP
UROBILINOGEN FLD QL: 1 MG/DL — SIGNIFICANT CHANGE UP (ref 0.2–1)
WBC # BLD: 31.02 K/UL — HIGH (ref 3.8–10.5)
WBC # FLD AUTO: 31.02 K/UL — HIGH (ref 3.8–10.5)

## 2024-05-13 PROCEDURE — 43274 ERCP DUCT STENT PLACEMENT: CPT | Mod: GC,59

## 2024-05-13 PROCEDURE — 43262 ENDO CHOLANGIOPANCREATOGRAPH: CPT | Mod: GC,59

## 2024-05-13 PROCEDURE — 43276 ERCP STENT EXCHANGE W/DILATE: CPT | Mod: GC

## 2024-05-13 PROCEDURE — 99222 1ST HOSP IP/OBS MODERATE 55: CPT | Mod: 25

## 2024-05-13 PROCEDURE — 99233 SBSQ HOSP IP/OBS HIGH 50: CPT | Mod: GC

## 2024-05-13 PROCEDURE — 74328 X-RAY BILE DUCT ENDOSCOPY: CPT | Mod: 26,GC,59

## 2024-05-13 PROCEDURE — 43264 ERCP REMOVE DUCT CALCULI: CPT | Mod: GC,59

## 2024-05-13 DEVICE — STENT BIL PUSH CATH 7FRX170CM: Type: IMPLANTABLE DEVICE | Status: FUNCTIONAL

## 2024-05-13 DEVICE — BLLN EXTRCTR PRO RX-S 9-12MM: Type: IMPLANTABLE DEVICE | Status: FUNCTIONAL

## 2024-05-13 DEVICE — HYDRATOME 44: Type: IMPLANTABLE DEVICE | Status: FUNCTIONAL

## 2024-05-13 DEVICE — IMPLANTABLE DEVICE: Type: IMPLANTABLE DEVICE | Status: FUNCTIONAL

## 2024-05-13 RX ORDER — ENOXAPARIN SODIUM 100 MG/ML
40 INJECTION SUBCUTANEOUS EVERY 24 HOURS
Refills: 0 | Status: DISCONTINUED | OUTPATIENT
Start: 2024-05-13 | End: 2024-05-15

## 2024-05-13 RX ORDER — PIPERACILLIN AND TAZOBACTAM 4; .5 G/20ML; G/20ML
3.38 INJECTION, POWDER, LYOPHILIZED, FOR SOLUTION INTRAVENOUS EVERY 8 HOURS
Refills: 0 | Status: DISCONTINUED | OUTPATIENT
Start: 2024-05-13 | End: 2024-05-15

## 2024-05-13 RX ADMIN — PIPERACILLIN AND TAZOBACTAM 25 GRAM(S): 4; .5 INJECTION, POWDER, LYOPHILIZED, FOR SOLUTION INTRAVENOUS at 14:59

## 2024-05-13 RX ADMIN — ENOXAPARIN SODIUM 40 MILLIGRAM(S): 100 INJECTION SUBCUTANEOUS at 22:40

## 2024-05-13 RX ADMIN — PIPERACILLIN AND TAZOBACTAM 25 GRAM(S): 4; .5 INJECTION, POWDER, LYOPHILIZED, FOR SOLUTION INTRAVENOUS at 07:54

## 2024-05-13 RX ADMIN — PIPERACILLIN AND TAZOBACTAM 25 GRAM(S): 4; .5 INJECTION, POWDER, LYOPHILIZED, FOR SOLUTION INTRAVENOUS at 22:40

## 2024-05-13 RX ADMIN — Medication 100 MILLIGRAM(S): at 11:24

## 2024-05-13 NOTE — CONSULT NOTE ADULT - ASSESSMENT
89F h/o gallstone pancreatitis (8/2023, s/p ERCP w/ sphincterotomy and stent placement), HTN, gout p/w abd pain and N/V x1d, found with cholangitis.    Pt likely has a clogged biliary stent given that she has had no follow-up since biliary stent placement in 8/2023.     Recommendations:  - Keep NPO for ERCP today  - Agree with Zosyn    We will continue to follow. Please see attending addendum for final recommendations.     Suhail (Johnathanvicky) MD Andrés  Gastroenterology Fellow  Pager (M-F 7a-2m): [phone]  Pager (after hours): Please call  for on-call fellow   89F h/o gallstone pancreatitis (8/2023, s/p ERCP w/ sphincterotomy and stent placement), HTN, gout p/w abd pain and N/V x1d, found with cholangitis.    Pt likely has a clogged biliary stent given that she has had no follow-up since biliary stent placement in 8/2023.     Recommendations:  - Keep NPO for ERCP w/ stent exchange today  - Agree with Zosyn  - Pt will need outpatient follow-up with Dr. Burch in 2-3 months at [address] [place] [address], [address] [place] [address] (phone: [phone]). Please include this information in DC paperwork.    We will continue to follow. Please see attending addendum for final recommendations.     Suhail (Bj Bowen MD  Gastroenterology Fellow  Pager (M-F 3c-7h): [phone]  Pager (after hours): Please call  for on-call fellow   89F h/o gallstone pancreatitis (8/2023, s/p ERCP w/ sphincterotomy and stent placement), HTN, gout p/w abd pain and N/V x1d, found with cholangitis.    Pt likely has a clogged biliary stent given that she has had no follow-up since biliary stent placement in 8/2023.     Recommendations:  - Keep NPO for ERCP w/ stent exchange today  - Agree with Zosyn    We will continue to follow. Please see attending addendum for final recommendations.     Suhail (Sanjay) MD Andrés  Gastroenterology Fellow  Pager (M-F 7a-5p): [phone]  Pager (after hours): Please call  for on-call fellow

## 2024-05-13 NOTE — PACU DISCHARGE NOTE - NAUSEA/VOMITING:
Reason for Admission:  NSTEMI (non-ST elevated myocardial infarction) ([facility] [address] [I21.4]  CAD (coronary artery disease) [I25.10]  NSTEMI (non-ST elevated myocardial infarction) ([facility] [address] [I21.4]                 RRAT Score:    10%            Plan for utilizing home health:    For [facility] visit with [facility]                      Likelihood of Readmission:   LOW                         Transition of Care Plan:              Initial assessment completed with patient. Cognitive status of patient: oriented to time, place, person and situation. Face sheet information confirmed:  yes. The patient designates girlfriend, Olya Amezquita to participate in his discharge plan and to receive any needed information. This patient lives in a single family home alone. Patient is able to navigate steps as needed. Prior to hospitalization, patient was considered to be independent with ADLs/IADLS : yes . Patient has a current ACP document on file: no  The girlfriend will be available to transport patient home upon discharge. The patient already has nebulizer available in the home. Patient is not currently active with home health. Patient has not stayed in a skilled nursing facility or rehab. This patient is on dialysis :no    Currently, the discharge plan is Home with a one time [facility] visit with [facility]    The patient states that he cannot obtain his medications from the pharmacy, and therefore is unable to take his medications as directed. Patient's current insurance is Medicare. Strongly encouraged patient to apply for medicaid as he is on a fixed income and does not have Medicare part D or a supplemental.  Pt given a one month supply free of Brilinta until he transitions to plavix. Pt met with diabetes educator prior to discharge and was given a meter. Care Management Interventions  PCP Verified by CM:  Yes  Mode of Transport at Discharge: Self  Transition of Care Consult (CM Consult): Discharge Planning  Current Support Network: Lives Alone  Confirm Follow Up Transport: Friends  The Plan for Transition of Care is Related to the Following Treatment Goals : home with [facility] visit  The Patient and/or Patient Representative was Provided with a Choice of Provider and Agrees with the Discharge Plan?: Yes  Freedom of Choice List was Provided with Basic Dialogue that Supports the Patient's Individualized Plan of Care/Goals, Treatment Preferences and Shares the Quality Data Associated with the Providers?: Yes  Discharge Location  Discharge Placement: Home with home health        Soniya Stark RN - Outcomes Manager  [phone] None

## 2024-05-13 NOTE — PATIENT PROFILE ADULT - NSPROPOAPRESSUREINJURY_GEN_A_NUR
Change to metoprolol xr 50 mg daily and lower dose of diuretic to 12 5 hctz  Get labs next Tuesday  followup on Thursday for recheck of bp and review labs  If stable will be cleared for surgery if no other issues
no

## 2024-05-13 NOTE — PHYSICAL THERAPY INITIAL EVALUATION ADULT - GAIT DEVIATIONS NOTED, PT EVAL
fairly steady gait, no lose of balance, decreased heel strike and hip flexion bilaterally./decreased kimberly/increased time in double stance/decreased step length

## 2024-05-13 NOTE — PRE-ANESTHESIA EVALUATION ADULT - NSANTHPMHFT_GEN_ALL_CORE
Cardiac: Positive for HTN, heart murmur. Denies HLD, MI/Angina/Heart Failure, Arrhythmia, Murmur/Valvular Disorder <4 METS  Pulmonary: Denies Asthma, COPD, NUBIA  Renal: Denies kidney dysfunction  Hepatic: Positive for elevated LFTs.   Gastrointestinal: Positive for choledocholithiasis, prior biliary stent placed one year ago 8/2023, blood cultures positive for E. Coli, sepsis. Denies GERD/IBS.  Endocrine: Denies DM or thyroid dysfunction  Neurologic: Denies stroke/seizure disorder  Hematologic: Denies anemia, blood clotting disorder, blood thinning medication.   Infectious: Sepsis/blood cultures positive for E. Coli.     PSH: Cataract lens replacement, left ankle fracture repair,

## 2024-05-13 NOTE — CONSULT NOTE ADULT - SUBJECTIVE AND OBJECTIVE BOX
HPI:  89F h/o gallstone pancreatitis (8/2023, s/p ERCP w/ sphincterotomy and stent placement), HTN, gout p/w abd pain and N/V x1d.    Pt's granddaughter was seeing pt on Mother's Day and noted that she had progressive abd pain, N/V, and SOB thus called EMS. At [facility] she was found initially with T100.9F, BP 80/50, lactate 3.2 and CT showing CBD stent still within lower CBD but with choledocholithiasis, moderate biliary dilation, and no pneumobilia. BCx positive for E. coli.     Allergies  No Known Allergies    Intolerances      Home Medications:  allopurinol 100 mg oral tablet: 1 tab(s) orally once a day (03 May 2020 03:30)  amLODIPine 10 mg oral tablet: 1 tab(s) orally once a day (12 May 2024 22:24)  metoprolol succinate 50 mg oral tablet, extended release: 1 tab(s) orally once a day (03 May 2020 03:28)    MEDICATIONS:  MEDICATIONS  (STANDING):  allopurinol 100 milliGRAM(s) Oral every 24 hours  lactated ringers. 1000 milliLiter(s) (80 mL/Hr) IV Continuous <Continuous>  piperacillin/tazobactam IVPB.. 3.375 Gram(s) IV Intermittent every 8 hours    MEDICATIONS  (PRN):  acetaminophen     Tablet .. 650 milliGRAM(s) Oral every 6 hours PRN Temp greater or equal to 38C (100.4F), Mild Pain (1 - 3)  morphine  - Injectable 2 milliGRAM(s) IV Push every 6 hours PRN Severe Pain (7 - 10)  ondansetron Injectable 4 milliGRAM(s) IV Push every 8 hours PRN Nausea and/or Vomiting    PAST MEDICAL & SURGICAL HISTORY:  HTN (hypertension)      Gout      Cataract  L eye, s/p surgery      Blindness of right eye      Heart murmur      Gallstones      Fracture of left ankle  s/p surgical repair      S/P cataract surgery          Vital Signs Last 24 Hrs  T(C): 37.2 (13 May 2024 11:49), Max: 38.3 (12 May 2024 17:14)  T(F): 98.9 (13 May 2024 11:49), Max: 100.9 (12 May 2024 17:14)  HR: 76 (13 May 2024 11:49) (62 - 117)  BP: 106/67 (13 May 2024 11:49) (104/57 - 149/90)  BP(mean): 111 (12 May 2024 16:49) (111 - 111)  RR: 17 (13 May 2024 11:49) (17 - 20)  SpO2: 95% (13 May 2024 11:49) (94% - 97%)    Parameters below as of 13 May 2024 11:49  Patient On (Oxygen Delivery Method): room air          PHYSICAL EXAM:  General: Alert, no acute distress  Lungs: Normal respiratory effort  Abdomen: Soft, nondistended, nontender  Extremities: *** edema  Neurological: Moving all extremities spontaneously***    LABS:                        12.5   31.02 )-----------( 325      ( 13 May 2024 05:30 )             39.5     05-13    135  |  101  |  17  ----------------------------<  135<H>  4.5   |  19<L>  |  1.13    Ca    8.6      13 May 2024 05:30  Phos  5.2     05-13  Mg     1.8     05-13    TPro  6.9  /  Alb  3.2<L>  /  TBili  3.7<H>  /  DBili  3.1<H>  /  AST  192<H>  /  ALT  175<H>  /  AlkPhos  167<H>  05-13    PT/INR - ( 13 May 2024 05:30 )   PT: 13.6 sec;   INR: 1.20          PTT - ( 13 May 2024 05:30 )  PTT:27.2 sec    RADIOLOGY & ADDITIONAL STUDIES:  Reviewed. HPI:  89F h/o gallstone pancreatitis (8/2023, s/p ERCP w/ sphincterotomy and stent placement), HTN, gout p/w abd pain and N/V x1d.    Pt's granddaughter was seeing pt on Mother's Day and noted that she had progressive abd pain, N/V, and SOB thus called EMS. At [facility] she was found initially with T100.9F, BP 80/50, lactate 3.2 and CT showing CBD stent still within lower CBD but with choledocholithiasis, moderate biliary dilation, and no pneumobilia. BCx positive for E. coli.     Allergies  No Known Allergies    Intolerances      Home Medications:  allopurinol 100 mg oral tablet: 1 tab(s) orally once a day (03 May 2020 03:30)  amLODIPine 10 mg oral tablet: 1 tab(s) orally once a day (12 May 2024 22:24)  metoprolol succinate 50 mg oral tablet, extended release: 1 tab(s) orally once a day (03 May 2020 03:28)    MEDICATIONS:  MEDICATIONS  (STANDING):  allopurinol 100 milliGRAM(s) Oral every 24 hours  lactated ringers. 1000 milliLiter(s) (80 mL/Hr) IV Continuous <Continuous>  piperacillin/tazobactam IVPB.. 3.375 Gram(s) IV Intermittent every 8 hours    MEDICATIONS  (PRN):  acetaminophen     Tablet .. 650 milliGRAM(s) Oral every 6 hours PRN Temp greater or equal to 38C (100.4F), Mild Pain (1 - 3)  morphine  - Injectable 2 milliGRAM(s) IV Push every 6 hours PRN Severe Pain (7 - 10)  ondansetron Injectable 4 milliGRAM(s) IV Push every 8 hours PRN Nausea and/or Vomiting    PAST MEDICAL & SURGICAL HISTORY:  HTN (hypertension)      Gout      Cataract  L eye, s/p surgery      Blindness of right eye      Heart murmur      Gallstones      Fracture of left ankle  s/p surgical repair      S/P cataract surgery          Vital Signs Last 24 Hrs  T(C): 37.2 (13 May 2024 11:49), Max: 38.3 (12 May 2024 17:14)  T(F): 98.9 (13 May 2024 11:49), Max: 100.9 (12 May 2024 17:14)  HR: 76 (13 May 2024 11:49) (62 - 117)  BP: 106/67 (13 May 2024 11:49) (104/57 - 149/90)  BP(mean): 111 (12 May 2024 16:49) (111 - 111)  RR: 17 (13 May 2024 11:49) (17 - 20)  SpO2: 95% (13 May 2024 11:49) (94% - 97%)    Parameters below as of 13 May 2024 11:49  Patient On (Oxygen Delivery Method): room air          PHYSICAL EXAM:  General: Alert, no acute distress  Lungs: Normal respiratory effort  Abdomen: Soft, nondistended  Extremities: No edema  Neurological: Moving all extremities spontaneously    LABS:                        12.5   31.02 )-----------( 325      ( 13 May 2024 05:30 )             39.5     05-13    135  |  101  |  17  ----------------------------<  135<H>  4.5   |  19<L>  |  1.13    Ca    8.6      13 May 2024 05:30  Phos  5.2     05-13  Mg     1.8     05-13    TPro  6.9  /  Alb  3.2<L>  /  TBili  3.7<H>  /  DBili  3.1<H>  /  AST  192<H>  /  ALT  175<H>  /  AlkPhos  167<H>  05-13    PT/INR - ( 13 May 2024 05:30 )   PT: 13.6 sec;   INR: 1.20          PTT - ( 13 May 2024 05:30 )  PTT:27.2 sec    RADIOLOGY & ADDITIONAL STUDIES:  Reviewed.

## 2024-05-13 NOTE — PATIENT PROFILE ADULT - FALL HARM RISK - HARM RISK INTERVENTIONS

## 2024-05-13 NOTE — PATIENT PROFILE ADULT - FUNCTIONAL ASSESSMENT - DAILY ACTIVITY 1.
Pt in bed asleep, no distress noted. Pt c/o of left ankle pain last night, CLIFFORD Tesfaye was notified and Tylenol 650mg was ordered and administered and med was effective. Losartan and Norvasc were also resumed and administered.   Problem: Adjustment to Illness (Gastrointestinal Bleeding)  Goal: Optimal Coping with Acute Illness  Outcome: Ongoing, Progressing     Problem: Bleeding (Gastrointestinal Bleeding)  Goal: Hemostasis  Outcome: Ongoing, Progressing     Problem: Adult Inpatient Plan of Care  Goal: Plan of Care Review  Outcome: Ongoing, Progressing  Goal: Patient-Specific Goal (Individualized)  Outcome: Ongoing, Progressing  Goal: Absence of Hospital-Acquired Illness or Injury  Outcome: Ongoing, Progressing  Goal: Optimal Comfort and Wellbeing  Outcome: Ongoing, Progressing  Goal: Readiness for Transition of Care  Outcome: Ongoing, Progressing     Problem: Fluid and Electrolyte Imbalance (Acute Kidney Injury/Impairment)  Goal: Fluid and Electrolyte Balance  Outcome: Ongoing, Progressing     Problem: Oral Intake Inadequate (Acute Kidney Injury/Impairment)  Goal: Optimal Nutrition Intake  Outcome: Ongoing, Progressing     Problem: Renal Function Impairment (Acute Kidney Injury/Impairment)  Goal: Effective Renal Function  Outcome: Ongoing, Progressing     Problem: Skin Injury Risk Increased  Goal: Skin Health and Integrity  Outcome: Ongoing, Progressing     Problem: Fall Injury Risk  Goal: Absence of Fall and Fall-Related Injury  Outcome: Ongoing, Progressing      2 = A lot of assistance

## 2024-05-13 NOTE — PRE-ANESTHESIA EVALUATION ADULT - NSRADCARDRESULTSFT_GEN_ALL_CORE
CT Abdomen/Pelvis with Oral and IV Contrast 5/12/2024  "IMPRESSION: CBD stent migrated into the lower CBD. There is moderate   biliary dilatation. No pneumobilia. Choledocholithiasis again noted."

## 2024-05-13 NOTE — PHYSICAL THERAPY INITIAL EVALUATION ADULT - GENERAL OBSERVATIONS, REHAB EVAL
Pt received semi supine in bed with +IV, +prima fit, NAD. Pt left as semi supine in stretcher bed being transported off the floor, NAD.

## 2024-05-13 NOTE — CHART NOTE - NSCHARTNOTEFT_GEN_A_CORE
ERCP performed for cholangitis.    Findings:  - A previously placed biliary stent was seen at the ampulla which appeared well-positioned but clogged. The stent was removed with rat-tooth forceps.   - A sphincterotomy was performed, and balloon sweep of the CBD yielded copious amounts of stones/sludge and small amounts of pus  - Two 7Fr x 6cm double pigtail stents were inserted into the CBD with position confirmed via fluoroscopy    Plan:  - Clear liquid diet today, then advance tomorrow  - Continue broad-spectrum antibiotics  - Stent removal with Dr. Burch in 3 months (phone: [phone])    Suhail Bowen MD ([place])  Gastroenterology Fellow  Pager (M-F 7a-5p): [phone]  Pager (after hours): Please call  for on-call fellow

## 2024-05-13 NOTE — PRE-ANESTHESIA EVALUATION ADULT - NSDENTALSD_ENT_ALL_CORE
Missing several rear molars/teeth. Poor dentition with several chipped teeth, ground/worn teeth, cavities./missing teeth Edentulous upper and lower./missing teeth

## 2024-05-13 NOTE — PROGRESS NOTE ADULT - ASSESSMENT
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

## 2024-05-13 NOTE — PHYSICAL THERAPY INITIAL EVALUATION ADULT - PERTINENT HX OF CURRENT PROBLEM, REHAB EVAL
Pt is a 89 yo female presenting to ED with abd pain and nausea x1 day, found to have severe sepsis and migration of prior CBD stent, admitted for management and ERCP.

## 2024-05-13 NOTE — PHYSICAL THERAPY INITIAL EVALUATION ADULT - ADDITIONAL COMMENTS
Pt lives alone in an apt with elevator, denies stairs. Prior to admission, pt ambulated independently with cane and rollator. Pt has an aide 5 hours x 5 days. Pt has a shower chair for the shower.

## 2024-05-14 PROBLEM — K80.20 CALCULUS OF GALLBLADDER WITHOUT CHOLECYSTITIS WITHOUT OBSTRUCTION: Chronic | Status: ACTIVE | Noted: 2024-05-12

## 2024-05-14 LAB
ALBUMIN SERPL ELPH-MCNC: 3.1 G/DL — LOW (ref 3.3–5)
ALP SERPL-CCNC: 168 U/L — HIGH (ref 40–120)
ALT FLD-CCNC: 130 U/L — HIGH (ref 10–45)
ANION GAP SERPL CALC-SCNC: 14 MMOL/L — SIGNIFICANT CHANGE UP (ref 5–17)
AST SERPL-CCNC: 92 U/L — HIGH (ref 10–40)
BASOPHILS # BLD AUTO: 0.03 K/UL — SIGNIFICANT CHANGE UP (ref 0–0.2)
BASOPHILS NFR BLD AUTO: 0.1 % — SIGNIFICANT CHANGE UP (ref 0–2)
BILIRUB SERPL-MCNC: 2.6 MG/DL — HIGH (ref 0.2–1.2)
BLD GP AB SCN SERPL QL: NEGATIVE — SIGNIFICANT CHANGE UP
BUN SERPL-MCNC: 16 MG/DL — SIGNIFICANT CHANGE UP (ref 7–23)
CALCIUM SERPL-MCNC: 9.3 MG/DL — SIGNIFICANT CHANGE UP (ref 8.4–10.5)
CHLORIDE SERPL-SCNC: 104 MMOL/L — SIGNIFICANT CHANGE UP (ref 96–108)
CO2 SERPL-SCNC: 20 MMOL/L — LOW (ref 22–31)
CREAT SERPL-MCNC: 1.13 MG/DL — SIGNIFICANT CHANGE UP (ref 0.5–1.3)
EGFR: 45 ML/MIN/1.73M2 — LOW
EOSINOPHIL # BLD AUTO: 0 K/UL — SIGNIFICANT CHANGE UP (ref 0–0.5)
EOSINOPHIL NFR BLD AUTO: 0 % — SIGNIFICANT CHANGE UP (ref 0–6)
GLUCOSE SERPL-MCNC: 163 MG/DL — HIGH (ref 70–99)
HCT VFR BLD CALC: 37 % — SIGNIFICANT CHANGE UP (ref 34.5–45)
HGB BLD-MCNC: 12 G/DL — SIGNIFICANT CHANGE UP (ref 11.5–15.5)
IMM GRANULOCYTES NFR BLD AUTO: 2 % — HIGH (ref 0–0.9)
LIDOCAIN IGE QN: 6 U/L — LOW (ref 7–60)
LYMPHOCYTES # BLD AUTO: 0.97 K/UL — LOW (ref 1–3.3)
LYMPHOCYTES # BLD AUTO: 3.8 % — LOW (ref 13–44)
MAGNESIUM SERPL-MCNC: 2 MG/DL — SIGNIFICANT CHANGE UP (ref 1.6–2.6)
MCHC RBC-ENTMCNC: 28.7 PG — SIGNIFICANT CHANGE UP (ref 27–34)
MCHC RBC-ENTMCNC: 32.4 GM/DL — SIGNIFICANT CHANGE UP (ref 32–36)
MCV RBC AUTO: 88.5 FL — SIGNIFICANT CHANGE UP (ref 80–100)
MONOCYTES # BLD AUTO: 1.29 K/UL — HIGH (ref 0–0.9)
MONOCYTES NFR BLD AUTO: 5 % — SIGNIFICANT CHANGE UP (ref 2–14)
NEUTROPHILS # BLD AUTO: 22.78 K/UL — HIGH (ref 1.8–7.4)
NEUTROPHILS NFR BLD AUTO: 89.1 % — HIGH (ref 43–77)
NRBC # BLD: 0 /100 WBCS — SIGNIFICANT CHANGE UP (ref 0–0)
PHOSPHATE SERPL-MCNC: 3.4 MG/DL — SIGNIFICANT CHANGE UP (ref 2.5–4.5)
PLATELET # BLD AUTO: 271 K/UL — SIGNIFICANT CHANGE UP (ref 150–400)
POTASSIUM SERPL-MCNC: 4.3 MMOL/L — SIGNIFICANT CHANGE UP (ref 3.5–5.3)
POTASSIUM SERPL-SCNC: 4.3 MMOL/L — SIGNIFICANT CHANGE UP (ref 3.5–5.3)
PROT SERPL-MCNC: 7.1 G/DL — SIGNIFICANT CHANGE UP (ref 6–8.3)
RBC # BLD: 4.18 M/UL — SIGNIFICANT CHANGE UP (ref 3.8–5.2)
RBC # FLD: 16.4 % — HIGH (ref 10.3–14.5)
RH IG SCN BLD-IMP: POSITIVE — SIGNIFICANT CHANGE UP
SODIUM SERPL-SCNC: 138 MMOL/L — SIGNIFICANT CHANGE UP (ref 135–145)
WBC # BLD: 25.59 K/UL — HIGH (ref 3.8–10.5)
WBC # FLD AUTO: 25.59 K/UL — HIGH (ref 3.8–10.5)

## 2024-05-14 PROCEDURE — 99233 SBSQ HOSP IP/OBS HIGH 50: CPT | Mod: GC

## 2024-05-14 PROCEDURE — 99232 SBSQ HOSP IP/OBS MODERATE 35: CPT | Mod: GC

## 2024-05-14 RX ORDER — LANOLIN ALCOHOL/MO/W.PET/CERES
5 CREAM (GRAM) TOPICAL AT BEDTIME
Refills: 0 | Status: DISCONTINUED | OUTPATIENT
Start: 2024-05-14 | End: 2024-05-15

## 2024-05-14 RX ADMIN — Medication 100 MILLIGRAM(S): at 11:58

## 2024-05-14 RX ADMIN — ENOXAPARIN SODIUM 40 MILLIGRAM(S): 100 INJECTION SUBCUTANEOUS at 22:37

## 2024-05-14 RX ADMIN — Medication 30 MILLILITER(S): at 22:38

## 2024-05-14 RX ADMIN — PIPERACILLIN AND TAZOBACTAM 25 GRAM(S): 4; .5 INJECTION, POWDER, LYOPHILIZED, FOR SOLUTION INTRAVENOUS at 23:30

## 2024-05-14 RX ADMIN — PIPERACILLIN AND TAZOBACTAM 25 GRAM(S): 4; .5 INJECTION, POWDER, LYOPHILIZED, FOR SOLUTION INTRAVENOUS at 06:28

## 2024-05-14 RX ADMIN — PIPERACILLIN AND TAZOBACTAM 25 GRAM(S): 4; .5 INJECTION, POWDER, LYOPHILIZED, FOR SOLUTION INTRAVENOUS at 14:43

## 2024-05-14 NOTE — PROGRESS NOTE ADULT - ASSESSMENT
89F h/o gallstone pancreatitis (8/2023, s/p ERCP w/ sphincterotomy and stent placement), HTN, gout p/w abd pain and N/V x1d, found with cholangitis from clogged stent.    ERCP 5/13 showed that the previously placed biliary stent was clogged, and she underwent sphincterotomy, balloon sweep, and placement of two 7Fr x 6cm double pigtail stents. Doing well today.    Recommendations:  - Advance diet as tolerated  - Abx per primary team  - Stent removal with Dr. Burch in 3 months (phone: [phone])    We will sign off, but please page if any further questions arise. Please see attending addendum for final recommendations.    Suhail (Bj Bowen MD  Gastroenterology Fellow  Pager (M-F 7a-5p): [phone]  Pager (after hours): Please call  for on-call fellow 89F h/o gallstone pancreatitis (8/2023, s/p ERCP w/ sphincterotomy and stent placement), HTN, gout p/w abd pain and N/V x1d, found with cholangitis from clogged stent.    ERCP 5/13 showed that the previously placed biliary stent was clogged, and she underwent sphincterotomy, balloon sweep, and placement of two 7Fr x 6cm double pigtail stents. Doing well today.    Recommendations:  - Advance diet as tolerated  - Abx per primary team for treatment of GNR bacteremia  - Stent removal with Dr. Burch in 3 months (phone: [phone])    We will sign off, but please page if any further questions arise. Please see attending addendum for final recommendations.    Suhail (Bj Bowen MD  Gastroenterology Fellow  Pager (M-F 7a-0a): [phone]  Pager (after hours): Please call  for on-call fellow 89F h/o gallstone pancreatitis (8/2023, s/p ERCP w/ sphincterotomy and stent placement), HTN, gout p/w abd pain and N/V x1d, found with cholangitis/E. coli bacteremia from clogged stent.    ERCP 5/13 showed that the previously placed biliary stent was clogged, and she underwent sphincterotomy, balloon sweep, and placement of two 7Fr x 6cm double pigtail stents. Doing well today.    Recommendations:  - Advance diet as tolerated  - Abx per primary team for treatment of GNR bacteremia  - Stent removal with Dr. Burch in 3 months (phone: [phone])    We will sign off, but please page if any further questions arise. Please see attending addendum for final recommendations.    Suhail (Bj Bowen MD  Gastroenterology Fellow  Pager (M-F 7a-8j): [phone]  Pager (after hours): Please call  for on-call fellow

## 2024-05-15 ENCOUNTER — TRANSCRIPTION ENCOUNTER (OUTPATIENT)
Age: 89
End: 2024-05-15

## 2024-05-15 VITALS
DIASTOLIC BLOOD PRESSURE: 87 MMHG | OXYGEN SATURATION: 96 % | SYSTOLIC BLOOD PRESSURE: 161 MMHG | RESPIRATION RATE: 18 BRPM | TEMPERATURE: 98 F | HEART RATE: 97 BPM

## 2024-05-15 LAB
ALBUMIN SERPL ELPH-MCNC: 2.8 G/DL — LOW (ref 3.3–5)
ALP SERPL-CCNC: 184 U/L — HIGH (ref 40–120)
ALT FLD-CCNC: 110 U/L — HIGH (ref 10–45)
ANION GAP SERPL CALC-SCNC: 12 MMOL/L — SIGNIFICANT CHANGE UP (ref 5–17)
AST SERPL-CCNC: 71 U/L — HIGH (ref 10–40)
BASOPHILS # BLD AUTO: 0.05 K/UL — SIGNIFICANT CHANGE UP (ref 0–0.2)
BASOPHILS NFR BLD AUTO: 0.3 % — SIGNIFICANT CHANGE UP (ref 0–2)
BILIRUB SERPL-MCNC: 1.1 MG/DL — SIGNIFICANT CHANGE UP (ref 0.2–1.2)
BUN SERPL-MCNC: 15 MG/DL — SIGNIFICANT CHANGE UP (ref 7–23)
CALCIUM SERPL-MCNC: 8.7 MG/DL — SIGNIFICANT CHANGE UP (ref 8.4–10.5)
CHLORIDE SERPL-SCNC: 103 MMOL/L — SIGNIFICANT CHANGE UP (ref 96–108)
CO2 SERPL-SCNC: 21 MMOL/L — LOW (ref 22–31)
CREAT SERPL-MCNC: 1.03 MG/DL — SIGNIFICANT CHANGE UP (ref 0.5–1.3)
EGFR: 50 ML/MIN/1.73M2 — LOW
EOSINOPHIL # BLD AUTO: 0.05 K/UL — SIGNIFICANT CHANGE UP (ref 0–0.5)
EOSINOPHIL NFR BLD AUTO: 0.3 % — SIGNIFICANT CHANGE UP (ref 0–6)
GLUCOSE SERPL-MCNC: 146 MG/DL — HIGH (ref 70–99)
HCT VFR BLD CALC: 38.2 % — SIGNIFICANT CHANGE UP (ref 34.5–45)
HGB BLD-MCNC: 12.1 G/DL — SIGNIFICANT CHANGE UP (ref 11.5–15.5)
IMM GRANULOCYTES NFR BLD AUTO: 1.4 % — HIGH (ref 0–0.9)
LYMPHOCYTES # BLD AUTO: 1.66 K/UL — SIGNIFICANT CHANGE UP (ref 1–3.3)
LYMPHOCYTES # BLD AUTO: 8.7 % — LOW (ref 13–44)
MAGNESIUM SERPL-MCNC: 2 MG/DL — SIGNIFICANT CHANGE UP (ref 1.6–2.6)
MCHC RBC-ENTMCNC: 28.5 PG — SIGNIFICANT CHANGE UP (ref 27–34)
MCHC RBC-ENTMCNC: 31.7 GM/DL — LOW (ref 32–36)
MCV RBC AUTO: 89.9 FL — SIGNIFICANT CHANGE UP (ref 80–100)
MONOCYTES # BLD AUTO: 1.3 K/UL — HIGH (ref 0–0.9)
MONOCYTES NFR BLD AUTO: 6.8 % — SIGNIFICANT CHANGE UP (ref 2–14)
NEUTROPHILS # BLD AUTO: 15.72 K/UL — HIGH (ref 1.8–7.4)
NEUTROPHILS NFR BLD AUTO: 82.5 % — HIGH (ref 43–77)
NRBC # BLD: 0 /100 WBCS — SIGNIFICANT CHANGE UP (ref 0–0)
PHOSPHATE SERPL-MCNC: 2 MG/DL — LOW (ref 2.5–4.5)
PLATELET # BLD AUTO: 276 K/UL — SIGNIFICANT CHANGE UP (ref 150–400)
POTASSIUM SERPL-MCNC: 3.8 MMOL/L — SIGNIFICANT CHANGE UP (ref 3.5–5.3)
POTASSIUM SERPL-SCNC: 3.8 MMOL/L — SIGNIFICANT CHANGE UP (ref 3.5–5.3)
PROT SERPL-MCNC: 6.8 G/DL — SIGNIFICANT CHANGE UP (ref 6–8.3)
RBC # BLD: 4.25 M/UL — SIGNIFICANT CHANGE UP (ref 3.8–5.2)
RBC # FLD: 16.3 % — HIGH (ref 10.3–14.5)
SODIUM SERPL-SCNC: 136 MMOL/L — SIGNIFICANT CHANGE UP (ref 135–145)
WBC # BLD: 19.05 K/UL — HIGH (ref 3.8–10.5)
WBC # FLD AUTO: 19.05 K/UL — HIGH (ref 3.8–10.5)

## 2024-05-15 PROCEDURE — 86850 RBC ANTIBODY SCREEN: CPT

## 2024-05-15 PROCEDURE — C2617: CPT

## 2024-05-15 PROCEDURE — C1889: CPT

## 2024-05-15 PROCEDURE — C1769: CPT

## 2024-05-15 PROCEDURE — 82248 BILIRUBIN DIRECT: CPT

## 2024-05-15 PROCEDURE — 87077 CULTURE AEROBIC IDENTIFY: CPT

## 2024-05-15 PROCEDURE — 74177 CT ABD & PELVIS W/CONTRAST: CPT | Mod: MC

## 2024-05-15 PROCEDURE — 36415 COLL VENOUS BLD VENIPUNCTURE: CPT

## 2024-05-15 PROCEDURE — 80048 BASIC METABOLIC PNL TOTAL CA: CPT

## 2024-05-15 PROCEDURE — 76705 ECHO EXAM OF ABDOMEN: CPT

## 2024-05-15 PROCEDURE — 87150 DNA/RNA AMPLIFIED PROBE: CPT

## 2024-05-15 PROCEDURE — C9399: CPT

## 2024-05-15 PROCEDURE — 87186 SC STD MICRODIL/AGAR DIL: CPT

## 2024-05-15 PROCEDURE — 85025 COMPLETE CBC W/AUTO DIFF WBC: CPT

## 2024-05-15 PROCEDURE — 86901 BLOOD TYPING SEROLOGIC RH(D): CPT

## 2024-05-15 PROCEDURE — 84295 ASSAY OF SERUM SODIUM: CPT

## 2024-05-15 PROCEDURE — 83690 ASSAY OF LIPASE: CPT

## 2024-05-15 PROCEDURE — 84100 ASSAY OF PHOSPHORUS: CPT

## 2024-05-15 PROCEDURE — 82803 BLOOD GASES ANY COMBINATION: CPT

## 2024-05-15 PROCEDURE — 99239 HOSP IP/OBS DSCHRG MGMT >30: CPT | Mod: GC

## 2024-05-15 PROCEDURE — 85610 PROTHROMBIN TIME: CPT

## 2024-05-15 PROCEDURE — 84132 ASSAY OF SERUM POTASSIUM: CPT

## 2024-05-15 PROCEDURE — 97161 PT EVAL LOW COMPLEX 20 MIN: CPT

## 2024-05-15 PROCEDURE — 82247 BILIRUBIN TOTAL: CPT

## 2024-05-15 PROCEDURE — 81003 URINALYSIS AUTO W/O SCOPE: CPT

## 2024-05-15 PROCEDURE — 80076 HEPATIC FUNCTION PANEL: CPT

## 2024-05-15 PROCEDURE — 96374 THER/PROPH/DIAG INJ IV PUSH: CPT

## 2024-05-15 PROCEDURE — 83735 ASSAY OF MAGNESIUM: CPT

## 2024-05-15 PROCEDURE — 85730 THROMBOPLASTIN TIME PARTIAL: CPT

## 2024-05-15 PROCEDURE — 80053 COMPREHEN METABOLIC PANEL: CPT

## 2024-05-15 PROCEDURE — 83605 ASSAY OF LACTIC ACID: CPT

## 2024-05-15 PROCEDURE — 96375 TX/PRO/DX INJ NEW DRUG ADDON: CPT

## 2024-05-15 PROCEDURE — 86900 BLOOD TYPING SEROLOGIC ABO: CPT

## 2024-05-15 PROCEDURE — 74330 X-RAY BILE/PANC ENDOSCOPY: CPT

## 2024-05-15 PROCEDURE — 82330 ASSAY OF CALCIUM: CPT

## 2024-05-15 PROCEDURE — 99285 EMERGENCY DEPT VISIT HI MDM: CPT

## 2024-05-15 PROCEDURE — 84484 ASSAY OF TROPONIN QUANT: CPT

## 2024-05-15 PROCEDURE — 87040 BLOOD CULTURE FOR BACTERIA: CPT

## 2024-05-15 PROCEDURE — 71045 X-RAY EXAM CHEST 1 VIEW: CPT

## 2024-05-15 RX ORDER — ACETAMINOPHEN 500 MG
2 TABLET ORAL
Qty: 0 | Refills: 0 | DISCHARGE
Start: 2024-05-15

## 2024-05-15 RX ORDER — CEFPODOXIME PROXETIL 100 MG
1 TABLET ORAL
Qty: 14 | Refills: 0
Start: 2024-05-15 | End: 2024-05-21

## 2024-05-15 RX ORDER — SODIUM,POTASSIUM PHOSPHATES 278-250MG
1 POWDER IN PACKET (EA) ORAL ONCE
Refills: 0 | Status: COMPLETED | OUTPATIENT
Start: 2024-05-15 | End: 2024-05-15

## 2024-05-15 RX ADMIN — Medication 5 MILLIGRAM(S): at 00:42

## 2024-05-15 RX ADMIN — Medication 100 MILLIGRAM(S): at 10:15

## 2024-05-15 RX ADMIN — PIPERACILLIN AND TAZOBACTAM 25 GRAM(S): 4; .5 INJECTION, POWDER, LYOPHILIZED, FOR SOLUTION INTRAVENOUS at 14:04

## 2024-05-15 RX ADMIN — Medication 1 PACKET(S): at 10:14

## 2024-05-15 RX ADMIN — PIPERACILLIN AND TAZOBACTAM 25 GRAM(S): 4; .5 INJECTION, POWDER, LYOPHILIZED, FOR SOLUTION INTRAVENOUS at 07:46

## 2024-05-15 NOTE — DISCHARGE NOTE PROVIDER - NSDCFUSCHEDAPPT_GEN_ALL_CORE_FT
Francisco J Burch  [facility]  GASTRO [address] [id number]  Scheduled Appointment: 06/11/2024

## 2024-05-15 NOTE — DISCHARGE NOTE NURSING/CASE MANAGEMENT/SOCIAL WORK - PATIENT PORTAL LINK FT
You can access the FollowMyHealth Patient Portal offered by [facility] by registering at the following website: [email or web address]. By joining [employer]’s FollowMyHealth portal, you will also be able to view your health information using other applications (apps) compatible with our system.

## 2024-05-15 NOTE — DISCHARGE NOTE PROVIDER - HOSPITAL COURSE
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      1: Severe sepsis. 2/2 to cholangitis and Gram negative bacteremia  #Choledocolithiasis #Gram-negative bacteremia  Patient presenting with fever, tachycardia, hypotension and elevated lactate in setting of acute onset abdominal pain and nausea. CT A/P showing prior CBD stent migrated into the lower CBD with moderate biliary dilatation. Likely intra-abdominal source as below. Lipase negative.    s/p ERCP w/ stone removal, sludge clearance and replacement 2/ 2x stent 5/13 w/ GI tolerated procedure well. Pt's transaminitis and Alk Phos have improved significantly. Clinically pt has no unresolved sx    -s/p zosyn 3.375mg IV x1  -f/u E Coli sensitivities -> 10d of tx (Zosyn 3.375g 5/12-5/21) can transition to Cefpodoxime 200mg BID.    2: Biliary stent migration. - RESOLVED and replaced w/ new 2x stents  RESOLVED - stent has been replaced w/ 2x new stents on 5/13 with clearance of stones, sludge, and removal of non patent stent    Patient with previous admission 8/2023 for obstructive gallstone pancreatitis, received ERCP with sphincterotomy and biliary stent placement however never followed up with GI outpatient for stent exchange. Now presenting with acute onset nausea and abdominal pain with imaging showing prior CBD stent migration. New Leukocytosis and Transaminitis likely 2/2 to choledocholithiasis vs stent migration    -CT A/P with IV and PO contrast: CBD stent migrated into the lower CBD. There is moderate biliary dilatation. No pneumobilia. Choledocholithiasis again noted.    -zofran prn nausea/vomiting.    3: HTN (hypertension).   ·  Plan: History of HTN on home toprol XL 50mg qd and amlodipine 5mg vs 10mg qd (pervious admission med rec with 5mg however surescripts with prescriptions for 10mg- patient does not know dose)    -hold home BP meds in setting of sepsis   -monitor BPs.    4: Gout.   ·  Plan: On home allopurinol 100mg qd  -c/w home allopurinol.    Physical Exam:  GENERAL: NAD, lying in bed comfortably   HEAD:  Atraumatic, normocephalic  EYES: conjunctiva and sclera clear  ENT: Moist mucous membranes  NECK: Supple  HEART: Regular rate and rhythm, no murmurs, rubs, or gallops  LUNGS: Unlabored respirations.  Clear to auscultation bilaterally, no crackles, wheezing, or rhonchi  ABDOMEN: Soft, no TTP in the abdominal quadrants, rebound or guarding  EXTREMITIES: 2+ peripheral pulses bilaterally. 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      1: Severe sepsis. 2/2 to cholangitis and Gram negative bacteremia  #Choledocolithiasis #Gram-negative bacteremia  Patient presenting with fever, tachycardia, hypotension and elevated lactate in setting of acute onset abdominal pain and nausea. CT A/P showing prior CBD stent migrated into the lower CBD with moderate biliary dilatation. Likely intra-abdominal source as below. Lipase negative.    s/p ERCP w/ stone removal, sludge clearance and replacement 2/ 2x stent 5/13 w/ GI tolerated procedure well. Pt's transaminitis and Alk Phos have improved significantly. Clinically pt has no unresolved sx    -s/p zosyn 3.375mg IV x1  -f/u E Coli sensitivities -> 10d of tx (Zosyn 3.375g 5/12-5/21) can transition to Cefpodoxime 200mg BID.    2: Biliary stent migration. - RESOLVED and replaced w/ new 2x stents  RESOLVED - stent has been replaced w/ 2x new stents on 5/13 with clearance of stones, sludge, and removal of non patent stent    Patient with previous admission 8/2023 for obstructive gallstone pancreatitis, received ERCP with sphincterotomy and biliary stent placement however never followed up with GI outpatient for stent exchange. Now presenting with acute onset nausea and abdominal pain with imaging showing prior CBD stent migration. New Leukocytosis and Transaminitis likely 2/2 to choledocholithiasis vs stent migration    -CT A/P with IV and PO contrast: CBD stent migrated into the lower CBD. There is moderate biliary dilatation. No pneumobilia. Choledocholithiasis again noted.    -zofran prn nausea/vomiting.    3: HTN (hypertension).   ·  Plan: History of HTN on home toprol XL 50mg qd and amlodipine 5mg vs 10mg qd (pervious admission med rec with 5mg however surescripts with prescriptions for 10mg- patient does not know dose)    -hold home BP meds in setting of sepsis   -monitor BPs.    4: Gout.   ·  Plan: On home allopurinol 100mg qd  -c/w home allopurinol.    New meds: Cefpodoxime 200mg BID 5/12-5/21  Changes to old meds: None  Discontinued old meds: None    Physical Exam:  GENERAL: NAD, lying in bed comfortably   HEAD:  Atraumatic, normocephalic  EYES: conjunctiva and sclera clear  ENT: Moist mucous membranes  NECK: Supple  HEART: Regular rate and rhythm, no murmurs, rubs, or gallops  LUNGS: Unlabored respirations.  Clear to auscultation bilaterally, no crackles, wheezing, or rhonchi  ABDOMEN: Soft, no TTP in the abdominal quadrants, rebound or guarding  EXTREMITIES: 2+ peripheral pulses bilaterally. No clubbing, cyanosis, or edema  NERVOUS SYSTEM:  A&Ox3, no focal deficits   SKIN: No rashes or lesions

## 2024-05-15 NOTE — CONSULT NOTE ADULT - SUBJECTIVE AND OBJECTIVE BOX
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 History obtained from granddaughter at bedside who reports that she went to go see patient for mothers day today and when she got there patient complaining of abdominal pain, nausea and SOB that started around today at 11am. Granddaughter took her grandmothers BP which was high so gave her her home BP medication and some alkaselzer however symptoms became progressively worse so she called EMS. Reports the pain is localized to mid-upper and RUQ of abdomen, non-radiating. No vomiting. Patient denies any fevers/chills at home, headache, chest pain, vomiting, diarrhea, constipation, dysuria/hematuria, urinary problems, extremity pain or swelling. LBM today x2, normal and soft, no blood. Has been urinating normally. Says she has not followed up with GI or had any abdominal procedures done since her stent placement in August.     In the ED:  Initial vitals: Temp 97.4F (oral), 100.9F (rectal), HR 60->117, BP 80/50->149/90, RR 20, O2 100% RA  Labs significant for lactate 3.2->4.2->3.7, bicarb 20, alk phos 148, lipase wnl, trops negative   EKG 1st degree AV block  CT A/P with IV and PO contrast: CBD stent migrated into the lower CBD. There is moderate biliary dilatation. No pneumobilia. Choledocholithiasis again noted.  RUQ US: Dilatation of the gallbladder and intrahepatic and extrahepatic biliary ducts is redemonstrated. Questionable stone within the gallbladder neck. Borderline gallbladder wall thickening. No pericholecystic fluid or sonographic Tatum sign. Findings are unlikely to represent acute cholecystitis due to lack of sonographic Tatum sign. Consider further evaluation with nuclear hepatobiliary scan as clinically warranted.  Interventions: tylenol 650mg PO x1, morphine 4mg IVP x1, zosyn 3.375mg IV x1, vanc 1250mg IV x1, 1.5L NS    (12 May 2024 22:11)    PAST MEDICAL & SURGICAL HISTORY:  HTN (hypertension)      Gout      Cataract  L eye, s/p surgery      Blindness of right eye      Heart murmur      Gallstones      Fracture of left ankle  s/p surgical repair      S/P cataract surgery        MEDICATIONS  (STANDING):  allopurinol 100 milliGRAM(s) Oral every 24 hours  enoxaparin Injectable 40 milliGRAM(s) SubCutaneous every 24 hours  melatonin 5 milliGRAM(s) Oral at bedtime  piperacillin/tazobactam IVPB.. 3.375 Gram(s) IV Intermittent every 8 hours    MEDICATIONS  (PRN):  acetaminophen     Tablet .. 650 milliGRAM(s) Oral every 6 hours PRN Temp greater or equal to 38C (100.4F), Mild Pain (1 - 3)  aluminum hydroxide/magnesium hydroxide/simethicone Suspension 30 milliLiter(s) Oral every 6 hours PRN Dyspepsia  morphine  - Injectable 2 milliGRAM(s) IV Push every 6 hours PRN Severe Pain (7 - 10)  ondansetron Injectable 4 milliGRAM(s) IV Push every 8 hours PRN Nausea and/or Vomiting          Home Living Status :  lives alone in an elevator accessible apartment building ,   0 steps to enter ,   ramp access           -  Home Services :  5 hrs x  days/week      Baseline Functional Level Prior to Admission :             - ADL's/ IADL's :  independent           - Ambulatory status prior to admission :   walked with a cane , rollator         FAMILY HISTORY:  No pertinent family history in first degree relatives        CBC Full  -  ( 15 May 2024 05:30 )  WBC Count : 19.05 K/uL  RBC Count : 4.25 M/uL  Hemoglobin : 12.1 g/dL  Hematocrit : 38.2 %  Platelet Count - Automated : 276 K/uL  Mean Cell Volume : 89.9 fl  Mean Cell Hemoglobin : 28.5 pg  Mean Cell Hemoglobin Concentration : 31.7 gm/dL  Auto Neutrophil # : 15.72 K/uL  Auto Lymphocyte # : 1.66 K/uL  Auto Monocyte # : 1.30 K/uL  Auto Eosinophil # : 0.05 K/uL  Auto Basophil # : 0.05 K/uL  Auto Neutrophil % : 82.5 %  Auto Lymphocyte % : 8.7 %  Auto Monocyte % : 6.8 %  Auto Eosinophil % : 0.3 %  Auto Basophil % : 0.3 %      05-15    136  |  103  |  15  ----------------------------<  146<H>  3.8   |  21<L>  |  1.03    Ca    8.7      15 May 2024 05:30  Phos  2.0     05-15  Mg     2.0     05-15    TPro  6.8  /  Alb  2.8<L>  /  TBili  1.1  /  DBili  x   /  AST  71<H>  /  ALT  110<H>  /  AlkPhos  184<H>  05-15      Urinalysis Basic - ( 15 May 2024 05:30 )    Color: x / Appearance: x / SG: x / pH: x  Gluc: 146 mg/dL / Ketone: x  / Bili: x / Urobili: x   Blood: x / Protein: x / Nitrite: x   Leuk Esterase: x / RBC: x / WBC x   Sq Epi: x / Non Sq Epi: x / Bacteria: x        Radiology :     < from: CT Abdomen and Pelvis w/ Oral Cont and w/ IV Cont (05.12.24 @ 19:21) >    ACC: [id number] EXAM:  CT ABDOMEN AND PELVIS OC IC   ORDERED BY: RADHA MENDOZA     PROCEDURE DATE:  05/12/2024          INTERPRETATION:  CLINICAL INFORMATION: abdominal pain ACT    COMPARISON: 8.22.23.    CONTRAST/COMPLICATIONS:  IV Contrast: Isovue 370  90 cc administered   10 cc discarded  Oral Contrast: Omnipaque 350  Complications: None reported at time of study completion    PROCEDURE:  CT of the Abdomen and Pelvis was performed.  Sagittal and coronal reformats were performed.    FINDINGS:    LOWER CHEST: Within normal limits.    LIVER: Within normal limits.  BILE DUCTS: There is a plastic CBD stent migrated into the lower CBD.   There is moderate biliary dilatation. No pneumobilia. Choledocholithiasis   again noted.  GALLBLADDER: Withinnormal limits.  SPLEEN: Within normal limits.  PANCREAS: Within normal limits.  ADRENALS: Unchanged left adrenal nodule.  KIDNEYS/URETERS: Multiple indeterminate lesions including a 2.5 cm left   posterior lesion and a 1.2 cm right midpole lesion. Subcentimeter left   renal calculus.    BLADDER: Within normal limits.  REPRODUCTIVE ORGANS: Fibroid uterus.    BOWEL: No bowel obstruction. The appendix is normal.  Large hiatal hernia.  PERITONEUM: No ascites.  VESSELS:  Within normal limits.  RETROPERITONEUM/LYMPH NODES: No lymphadenopathy.  ABDOMINAL WALL: Within normal limits.  BONES: Within normal limits.    IMPRESSION: CBD stent migrated into the lower CBD. There is moderate   biliary dilatation. No pneumobilia. Choledocholithiasis again noted.              Review of Systems : per HPI         Vital Signs Last 24 Hrs  T(C): 36.8 (15 May 2024 05:41), Max: 36.8 (15 May 2024 05:41)  T(F): 98.2 (15 May 2024 05:41), Max: 98.2 (15 May 2024 05:41)  HR: 85 (15 May 2024 05:41) (72 - 91)  BP: 166/89 (15 May 2024 05:41) (141/72 - 166/89)  BP(mean): 115 (15 May 2024 05:41) (115 - 115)  RR: 18 (15 May 2024 05:41) (16 - 18)  SpO2: 96% (15 May 2024 05:41) (96% - 97%)    Parameters below as of 15 May 2024 05:41  Patient On (Oxygen Delivery Method): room air            Physical Exam:   89 y o woman lying comfortably in semi Cortez's position , awake , alert , no acute complaints , feels tired     Head: normocephalic , atraumatic    Eyes: PERRLA , EOMI , no nystagmus , sclera anicteric    ENT / FACE: neg nasal discharge , uvula midline , no oropharyngeal erythema / exudate    Neck: supple , negative JVD , negative carotid bruits , no thyromegaly    Chest: CTA bilaterally , neg wheeze / rhonchi / rales / crackles / egophany    Cardiovascular: regular rate and rhythm , neg murmurs / rubs / gallops    Abdomen: soft , non distended , no tenderness to palpation in all 4 quadrants ,  normal bowel sounds     Extremities: WWP , neg cyanosis /clubbing / edema     Neurologic Exam:     Alert and oriented to person , place , date/year , speech fluent w/o dysarthria      Cranial Nerves:           II:                         pupils equal , round and reactive to light , visual fields intact         III/ IV/VI:             extraocular movements intact , neg nystagmus , neg ptosis        V:                        facial sensation intact , V1-3 normal        VII:                      face symmetric , no droop , normal eye closure and smile        VIII:                     hearing intact to finger rub bilaterally        IX and X:             no hoarseness , gag intact , palate/ uvula rise symmetrically        XI:                       SCM / trapezius strength intact bilateral        XII:                      no tongue deviation    Motor Exam:        > 3+/5 x 4 extremities , without drift     Sensation:         intact to light touch x 4 extremities                            no neglect or extinction on double simultaneous testing    DTR:           biceps/brachioradialis: equal                      neg Babinski          Coordination:            Finger to Nose:  neg dysmetria bilaterally       Gait:  not tested         PM&R Impression: admitted for  abd pain and nausea x1 day , 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    2) Current disposition plan recommendation:   d/c home, home, P.T.

## 2024-05-15 NOTE — PROGRESS NOTE ADULT - PROBLEM SELECTOR PLAN 4
On home allopurinol 100mg qd  -c/w home allopurinol

## 2024-05-15 NOTE — PROGRESS NOTE ADULT - PROBLEM SELECTOR PLAN 1
RESOLVED  #Choledocolithiasis #Gram-negative bacteremia  Patient presenting with fever, tachycardia, hypotension and elevated lactate in setting of acute onset abdominal pain and nausea. CT A/P showing prior CBD stent migrated into the lower CBD with moderate biliary dilatation. Likely intra-abdominal source as below. Lipase negative.    s/p ERCP w/ stone removal, sludge clearance and replacement of stent 5/13 w/ GI tolerated procedure well    -s/p zosyn 3.375mg IV x1  -f/u E Coli sensitivities -> 10d of tx (Zosyn 3.375g 5/12-5/21) can transition to Cefpodoxime 200mg BID
#Choledocolithiasis  Patient presenting with fever, tachycardia, hypotension and elevated lactate in setting of acute onset abdominal pain and nausea. CT A/P showing prior CBD stent migrated into the lower CBD with moderate biliary dilatation. Likely intra-abdominal source as below. Lipase negative.    s/p ERCP w/ stone removal, sludge clearance and replacement of stent 5/13 w/ GI tolerated procedure well    -s/p zosyn 3.375mg IV x1  -f/u E Coli sensitivities -> 1 week of tx (Zosyn 3.375g)
#Choledocolithiasis  Patient presenting with fever, tachycardia, hypotension and elevated lactate in setting of acute onset abdominal pain and nausea. CT A/P showing prior CBD stent migrated into the lower CBD with moderate biliary dilatation. Likely intra-abdominal source as below. Lipase negative.    -s/p zosyn 3.375mg IV x1, vanc 1250mg IV x1, 1.5L NS in ED   -c/w zosyn to cover for intra-abdominal source   -GI consult in AM  -f/u BCs  -f/u UA/UCs

## 2024-05-15 NOTE — DISCHARGE NOTE PROVIDER - NSDCCPCAREPLAN_GEN_ALL_CORE_FT
PRINCIPAL DISCHARGE DIAGNOSIS  Diagnosis: Choledocholithiasis  Assessment and Plan of Treatment: You had a stent placed back in August however the stent was never re-evaluated. It seems as though the stent had descended down the bile duct and became obstructed causing a back up of bile causing your abdominal pain. This also resulted in an infection in the bloodstream. We started you on an intravenous antibiotic which seems to continue improving the infection. The GI doctors removed the obstructed stent along with some stones and bile sludge. They placed a new stent in the bile duct to help clear additional bile. You should continue your Cefpodoxime 200mg antibiotics twice daily until 5/21/24

## 2024-05-15 NOTE — DISCHARGE NOTE PROVIDER - NSDCMRMEDTOKEN_GEN_ALL_CORE_FT
acetaminophen 325 mg oral tablet: 2 tab(s) orally every 6 hours As needed Temp greater or equal to 38C (100.4F), Mild Pain (1 - 3)  allopurinol 100 mg oral tablet: 1 tab(s) orally once a day  amLODIPine 10 mg oral tablet: 1 tab(s) orally once a day  cefpodoxime 200 mg oral tablet: 1 tab(s) orally every 12 hours  metoprolol succinate 50 mg oral tablet, extended release: 1 tab(s) orally once a day

## 2024-05-15 NOTE — PROGRESS NOTE ADULT - PROBLEM SELECTOR PLAN 2
Patient with previous admission 8/2023 for obstructive gallstone pancreatitis, received ERCP with sphincterotomy and biliary stent placement however never followed up with GI outpatient for stent exchange. Now presenting with acute onset nausea and abdominal pain with imaging showing prior CBD stent migration. New Leukocytosis and Transaminitis likely 2/2 to choledocolithiasis vs stent migration    -CT A/P with IV and PO contrast: CBD stent migrated into the lower CBD. There is moderate biliary dilatation. No pneumobilia. Choledocholithiasis again noted.    -NPO   -GI consult in AM for ERCP  -zofran prn nausea/vomiting
RESOLVED - stent has been replaced w/ 2x new stents on 5/13 with clearance of stones, sludge, and removal of non patent stent    Patient with previous admission 8/2023 for obstructive gallstone pancreatitis, received ERCP with sphincterotomy and biliary stent placement however never followed up with GI outpatient for stent exchange. Now presenting with acute onset nausea and abdominal pain with imaging showing prior CBD stent migration. New Leukocytosis and Transaminitis likely 2/2 to choledocolithiasis vs stent migration    -CT A/P with IV and PO contrast: CBD stent migrated into the lower CBD. There is moderate biliary dilatation. No pneumobilia. Choledocholithiasis again noted.    -zofran prn nausea/vomiting
Patient with previous admission 8/2023 for obstructive gallstone pancreatitis, received ERCP with sphincterotomy and biliary stent placement however never followed up with GI outpatient for stent exchange. Now presenting with acute onset nausea and abdominal pain with imaging showing prior CBD stent migration. New Leukocytosis and Transaminitis likely 2/2 to choledocolithiasis vs stent migration    -CT A/P with IV and PO contrast: CBD stent migrated into the lower CBD. There is moderate biliary dilatation. No pneumobilia. Choledocholithiasis again noted.    -NPO   -GI consult in AM for ERCP  -zofran prn nausea/vomiting

## 2024-05-15 NOTE — CONSULT NOTE ADULT - ASSESSMENT
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    Problem/Plan - 1:  ·  Problem: Severe sepsis.   ·  Plan: #Choledocolithiasis  Patient presenting with fever, tachycardia, hypotension and elevated lactate in setting of acute onset abdominal pain and nausea. CT A/P showing prior CBD stent migrated into the lower CBD with moderate biliary dilatation. Likely intra-abdominal source as below. Lipase negative.    s/p ERCP w/ stone removal, sludge clearance and replacement of stent 5/13 w/ GI tolerated procedure well    -s/p zosyn 3.375mg IV x1  -f/u E Coli sensitivities -> 1 week of tx (Zosyn 3.375g).    Problem/Plan - 2:  ·  Problem: Biliary stent migration.   ·  Plan: Patient with previous admission 8/2023 for obstructive gallstone pancreatitis, received ERCP with sphincterotomy and biliary stent placement however never followed up with GI outpatient for stent exchange. Now presenting with acute onset nausea and abdominal pain with imaging showing prior CBD stent migration. New Leukocytosis and Transaminitis likely 2/2 to choledocolithiasis vs stent migration    -CT A/P with IV and PO contrast: CBD stent migrated into the lower CBD. There is moderate biliary dilatation. No pneumobilia. Choledocholithiasis again noted.    -NPO   -GI consult in AM for ERCP  -zofran prn nausea/vomiting.    Problem/Plan - 3:  ·  Problem: HTN (hypertension).   ·  Plan: History of HTN on home toprol XL 50mg qd and amlodipine 5mg vs 10mg qd (pervious admission med rec with 5mg however surescripts with prescriptions for 10mg- patient does not know dose)  -formal med rec in AM   -hold home BP meds in setting of sepsis   -monitor BPs.    Problem/Plan - 4:  ·  Problem: Gout.   ·  Plan: On home allopurinol 100mg qd  -c/w home allopurinol.    Problem/Plan - 5:  ·  Problem: Prophylactic measure.   ·  Plan: F: s/p 1.5L NS in ED  E: replete as needed  N: NPO  DVT ppx: holding for procedure   FULL CODE- confirmed with patient and granddaughter   Dispo: [facility].

## 2024-05-15 NOTE — PROGRESS NOTE ADULT - ATTENDING COMMENTS
Pt seen and d/w fellow.  Doing well.  Continue abx.  Outpt f/u for stent removal.
Pt is 89 yo woman with history of choledocholithiasis, s/p CBD stent placement a year back, admitted with abdominal pain and nausea. Found to have cholangitis due to misplaced CBD stent.   ----s/p ERCP and stent repositioning. LFTs have normalized.   ----pt is to complete 10 day course of antibiotics with PO Cefpodoxime for E. Coli bacteremia  ----pt lives alone but has strong social support from family and neighbors. Planned for dc today with HHA reinstated.
Pt is 89 yo woman with history of choledocholithiasis, s/p CBD stent placement a year back, admitted with abdominal pain and nausea. Found to have cholangitis due to misplaced CBD stent.   ----s/p ERCP and stent repositioning. LFTs are improving  ----cont Zosyn, hopefully can transition to PO once sensitivities come back  ----pt lives alone but has strong social support from family and neighbors.
Pt is 89 yo woman with history of choledocholithiasis, s/p CBD stent placement a year back, admitted with abdominal pain and nausea. Pt was suspected to have stones in CBD and was started on empiric antibiotics. Imaging showed shifting position of CBD stent and stones. Labs normal on admission, later revealed leukocytosis, abnormal LFTs.   ----pt planned for therapeutic ERCP for removal of stones and stent repositioning? vs removal vs percutaneous cholecystostomy? Gall blader is rather large  ----cont Zosyn  ----pt lives alone but has strong social support from family and neighbors.

## 2024-05-15 NOTE — DISCHARGE NOTE PROVIDER - CARE PROVIDER_API CALL
Francisco J Burch  Gastroenterology  [address]  [place] [address]  Phone: [phone]  Fax: [phone]  Established Patient  Scheduled Appointment: 08/15/2024

## 2024-05-15 NOTE — PROGRESS NOTE ADULT - SUBJECTIVE AND OBJECTIVE BOX
GASTROENTEROLOGY PROGRESS NOTE    INTERVAL/SUBJECTIVE:  Doing well today. No abd pain or other complaints.    Allergies  No Known Allergies    Intolerances      MEDICATIONS:  MEDICATIONS  (STANDING):  allopurinol 100 milliGRAM(s) Oral every 24 hours  enoxaparin Injectable 40 milliGRAM(s) SubCutaneous every 24 hours  lactated ringers. 1000 milliLiter(s) (80 mL/Hr) IV Continuous <Continuous>  piperacillin/tazobactam IVPB.. 3.375 Gram(s) IV Intermittent every 8 hours    MEDICATIONS  (PRN):  acetaminophen     Tablet .. 650 milliGRAM(s) Oral every 6 hours PRN Temp greater or equal to 38C (100.4F), Mild Pain (1 - 3)  aluminum hydroxide/magnesium hydroxide/simethicone Suspension 30 milliLiter(s) Oral every 6 hours PRN Dyspepsia  morphine  - Injectable 2 milliGRAM(s) IV Push every 6 hours PRN Severe Pain (7 - 10)  ondansetron Injectable 4 milliGRAM(s) IV Push every 8 hours PRN Nausea and/or Vomiting    Vital Signs Last 24 Hrs  T(C): 36.6 (14 May 2024 11:59), Max: 37.2 (13 May 2024 16:55)  T(F): 97.9 (14 May 2024 11:59), Max: 98.4 (13 May 2024 20:44)  HR: 72 (14 May 2024 11:59) (65 - 85)  BP: 141/72 (14 May 2024 11:59) (106/67 - 160/79)  BP(mean): 111 (13 May 2024 16:55) (111 - 111)  RR: 17 (14 May 2024 11:59) (17 - 18)  SpO2: 97% (14 May 2024 11:59) (95% - 97%)    Parameters below as of 14 May 2024 11:59  Patient On (Oxygen Delivery Method): room air        PHYSICAL EXAM:  General: Alert, no acute distress  Lungs: Normal respiratory effort  Abdomen: Soft, nondistended, nontender  Extremities: No edema  Neurological: Moving all extremities spontaneously    LABS:                        12.0   25.59 )-----------( 271      ( 14 May 2024 05:30 )             37.0     05-14    138  |  104  |  16  ----------------------------<  163<H>  4.3   |  20<L>  |  1.13    Ca    9.3      14 May 2024 05:30  Phos  3.4     05-14  Mg     2.0     05-14    TPro  7.1  /  Alb  3.1<L>  /  TBili  2.6<H>  /  DBili  x   /  AST  92<H>  /  ALT  130<H>  /  AlkPhos  168<H>  05-14    PT/INR - ( 13 May 2024 05:30 )   PT: 13.6 sec;   INR: 1.20          PTT - ( 13 May 2024 05:30 )  PTT:27.2 sec    RADIOLOGY & ADDITIONAL STUDIES: Reviewed
INTERVAL HPI/OVERNIGHT EVENTS:   No overnight events   Afebrile, hemodynamically stable     Subjective: Patient seen and examined at bedside. Has no acute complaints, discussed active BM and continues no abd pain    Physical Exam:  GENERAL: NAD, lying in bed comfortably   HEAD:  Atraumatic, normocephalic  EYES: conjunctiva and sclera clear  ENT: Moist mucous membranes  NECK: Supple  HEART: Regular rate and rhythm, no murmurs, rubs, or gallops  LUNGS: Unlabored respirations.  Clear to auscultation bilaterally, no crackles, wheezing, or rhonchi  ABDOMEN: Soft, no TTP in the abdominal quadrants, rebound or guarding  EXTREMITIES: 2+ peripheral pulses bilaterally. No clubbing, cyanosis, or edema  NERVOUS SYSTEM:  A&Ox3, no focal deficits   SKIN: No rashes or lesions    Vital Signs Last 24 Hrs  T(C): 36.8 (15 May 2024 05:41), Max: 36.8 (15 May 2024 05:41)  T(F): 98.2 (15 May 2024 05:41), Max: 98.2 (15 May 2024 05:41)  HR: 85 (15 May 2024 05:41) (72 - 91)  BP: 166/89 (15 May 2024 05:41) (141/72 - 166/89)    RR: 18 (15 May 2024 05:41) (16 - 18)  SpO2: 96% (15 May 2024 05:41) (96% - 97%)    O2 Parameters below as of 15 May 2024 05:41  Patient On (Oxygen Delivery Method): room air      I&O's Summary    14 May 2024 07:01  -  15 May 2024 07:00  --------------------------------------------------------  IN: 0 mL / OUT: 700 mL / NET: -700 mL      LABS:                        12.1   19.05 )-----------( 276      ( 15 May 2024 05:30 )             38.2     05-15    136  |  103  |  15  ----------------------------<  146<H>  3.8   |  21<L>  |  1.03    Ca    8.7      15 May 2024 05:30  Phos  2.0     05-15  Mg     2.0     05-15    TPro  6.8  /  Alb  2.8<L>  /  TBili  1.1  /  DBili  x   /  AST  71<H>  /  ALT  110<H>  /  AlkPhos  184<H>  05-15      Urinalysis Basic - ( 15 May 2024 05:30 )    Color: x / Appearance: x / SG: x / pH: x  Gluc: 146 mg/dL / Ketone: x  / Bili: x / Urobili: x   Blood: x / Protein: x / Nitrite: x   Leuk Esterase: x / RBC: x / WBC x   Sq Epi: x / Non Sq Epi: x / Bacteria: x      CAPILLARY BLOOD GLUCOSE            Consultant(s) Notes Reviewed:  [x ] YES  [ ] NO    MEDICATIONS  (STANDING):  allopurinol 100 milliGRAM(s) Oral every 24 hours  enoxaparin Injectable 40 milliGRAM(s) SubCutaneous every 24 hours  melatonin 5 milliGRAM(s) Oral at bedtime  piperacillin/tazobactam IVPB.. 3.375 Gram(s) IV Intermittent every 8 hours    MEDICATIONS  (PRN):  acetaminophen     Tablet .. 650 milliGRAM(s) Oral every 6 hours PRN Temp greater or equal to 38C (100.4F), Mild Pain (1 - 3)  aluminum hydroxide/magnesium hydroxide/simethicone Suspension 30 milliLiter(s) Oral every 6 hours PRN Dyspepsia  morphine  - Injectable 2 milliGRAM(s) IV Push every 6 hours PRN Severe Pain (7 - 10)  ondansetron Injectable 4 milliGRAM(s) IV Push every 8 hours PRN Nausea and/or Vomiting      Care Discussed with Consultants/Other Providers [ x] YES  [ ] NO    RADIOLOGY & ADDITIONAL TESTS:
INTERVAL HPI/OVERNIGHT EVENTS:   No overnight events   Afebrile, hemodynamically stable     Subjective: Patient seen and examined at bedside. Has no acute complaints, discussed active BM and continues no abd pain and tolerated ERCP well    Physical Exam:  GENERAL: NAD, lying in bed comfortably   HEAD:  Atraumatic, normocephalic  EYES: conjunctiva and sclera clear  ENT: Moist mucous membranes  NECK: Supple  HEART: Regular rate and rhythm, no murmurs, rubs, or gallops  LUNGS: Unlabored respirations.  Clear to auscultation bilaterally, no crackles, wheezing, or rhonchi  ABDOMEN: Soft, no TTP in the abdominal quadrants, rebound or guarding  EXTREMITIES: 2+ peripheral pulses bilaterally. No clubbing, cyanosis, or edema  NERVOUS SYSTEM:  A&Ox3, no focal deficits   SKIN: No rashes or lesions      Vital Signs Last 24 Hrs  T(C): 36.3 (14 May 2024 06:04), Max: 37.2 (13 May 2024 11:49)  T(F): 97.4 (14 May 2024 06:04), Max: 98.9 (13 May 2024 11:49)  HR: 65 (14 May 2024 06:04) (65 - 85)  BP: 137/75 (14 May 2024 06:04) (106/67 - 160/79)    RR: 18 (14 May 2024 06:04) (17 - 18)  SpO2: 96% (14 May 2024 06:04) (95% - 97%)    O2 Parameters below as of 14 May 2024 06:04  Patient On (Oxygen Delivery Method): room air    LABS:                        12.5   31.02 )-----------( 325      ( 13 May 2024 05:30 )             39.5     05-13    135  |  101  |  17  ----------------------------<  135<H>  4.5   |  19<L>  |  1.13    Ca    8.6      13 May 2024 05:30  Phos  5.2     05-13  Mg     1.8     05-13    TPro  6.9  /  Alb  3.2<L>  /  TBili  3.7<H>  /  DBili  3.1<H>  /  AST  192<H>  /  ALT  175<H>  /  AlkPhos  167<H>  05-13    PT/INR - ( 13 May 2024 05:30 )   PT: 13.6 sec;   INR: 1.20          PTT - ( 13 May 2024 05:30 )  PTT:27.2 sec  Urinalysis Basic - ( 13 May 2024 05:30 )    Color: x / Appearance: x / SG: x / pH: x  Gluc: 135 mg/dL / Ketone: x  / Bili: x / Urobili: x   Blood: x / Protein: x / Nitrite: x   Leuk Esterase: x / RBC: x / WBC x   Sq Epi: x / Non Sq Epi: x / Bacteria: x      CAPILLARY BLOOD GLUCOSE    Consultant(s) Notes Reviewed:  [x ] YES  [ ] NO    MEDICATIONS  (STANDING):  allopurinol 100 milliGRAM(s) Oral every 24 hours  enoxaparin Injectable 40 milliGRAM(s) SubCutaneous every 24 hours  lactated ringers. 1000 milliLiter(s) (80 mL/Hr) IV Continuous <Continuous>  piperacillin/tazobactam IVPB.. 3.375 Gram(s) IV Intermittent every 8 hours    MEDICATIONS  (PRN):  acetaminophen     Tablet .. 650 milliGRAM(s) Oral every 6 hours PRN Temp greater or equal to 38C (100.4F), Mild Pain (1 - 3)  aluminum hydroxide/magnesium hydroxide/simethicone Suspension 30 milliLiter(s) Oral every 6 hours PRN Dyspepsia  morphine  - Injectable 2 milliGRAM(s) IV Push every 6 hours PRN Severe Pain (7 - 10)  ondansetron Injectable 4 milliGRAM(s) IV Push every 8 hours PRN Nausea and/or Vomiting      Care Discussed with Consultants/Other Providers [ x] YES  [ ] NO    RADIOLOGY & ADDITIONAL TESTS:
INTERVAL HPI/OVERNIGHT EVENTS:   No overnight events   Afebrile, hemodynamically stable     Subjective: Patient seen and examined at bedside. Has no acute complaints    Vital Signs Last 24 Hrs  T(C): 37.2 (13 May 2024 11:49), Max: 38.3 (12 May 2024 17:14)  T(F): 98.9 (13 May 2024 11:49), Max: 100.9 (12 May 2024 17:14)  HR: 76 (13 May 2024 11:49) (60 - 117)  BP: 106/67 (13 May 2024 11:49) (80/50 - 149/90)    RR: 17 (13 May 2024 11:49) (17 - 20)  SpO2: 95% (13 May 2024 11:49) (94% - 100%)    O2 Parameters below as of 13 May 2024 11:49  Patient On (Oxygen Delivery Method): room air    Physical Exam:  GENERAL: NAD, lying in bed comfortably   HEAD:  Atraumatic, normocephalic  EYES: conjunctiva and sclera clear  ENT: Moist mucous membranes  NECK: Supple  HEART: Regular rate and rhythm, no murmurs, rubs, or gallops  LUNGS: Unlabored respirations.  Clear to auscultation bilaterally, no crackles, wheezing, or rhonchi  ABDOMEN: Soft, no TTP in the abdominal quadrants, rebound or guarding  EXTREMITIES: 2+ peripheral pulses bilaterally. No clubbing, cyanosis, or edema  NERVOUS SYSTEM:  A&Ox3, no focal deficits   SKIN: No rashes or lesions      LABS:                        12.5   31.02 )-----------( 325      ( 13 May 2024 05:30 )             39.5     05-13    135  |  101  |  17  ----------------------------<  135<H>  4.5   |  19<L>  |  1.13    Ca    8.6      13 May 2024 05:30  Phos  5.2     05-13  Mg     1.8     05-13    TPro  6.9  /  Alb  3.2<L>  /  TBili  3.7<H>  /  DBili  3.1<H>  /  AST  192<H>  /  ALT  175<H>  /  AlkPhos  167<H>  05-13    PT/INR - ( 13 May 2024 05:30 )   PT: 13.6 sec;   INR: 1.20     PTT - ( 13 May 2024 05:30 )  PTT:27.2 sec  Urinalysis Basic - ( 13 May 2024 05:30 )    Color: x / Appearance: x / SG: x / pH: x  Gluc: 135 mg/dL / Ketone: x  / Bili: x / Urobili: x   Blood: x / Protein: x / Nitrite: x   Leuk Esterase: x / RBC: x / WBC x   Sq Epi: x / Non Sq Epi: x / Bacteria: x        Consultant(s) Notes Reviewed:  [x ] YES  [ ] NO    MEDICATIONS  (STANDING):  allopurinol 100 milliGRAM(s) Oral every 24 hours  lactated ringers. 1000 milliLiter(s) (80 mL/Hr) IV Continuous <Continuous>  piperacillin/tazobactam IVPB.. 3.375 Gram(s) IV Intermittent every 8 hours    MEDICATIONS  (PRN):  acetaminophen     Tablet .. 650 milliGRAM(s) Oral every 6 hours PRN Temp greater or equal to 38C (100.4F), Mild Pain (1 - 3)  morphine  - Injectable 2 milliGRAM(s) IV Push every 6 hours PRN Severe Pain (7 - 10)  ondansetron Injectable 4 milliGRAM(s) IV Push every 8 hours PRN Nausea and/or Vomiting      Care Discussed with Consultants/Other Providers [ x] YES  [ ] NO    RADIOLOGY & ADDITIONAL TESTS:

## 2024-05-16 LAB
-  AMPICILLIN/SULBACTAM: SIGNIFICANT CHANGE UP
-  AMPICILLIN: SIGNIFICANT CHANGE UP
-  CEFAZOLIN: SIGNIFICANT CHANGE UP
-  CEFEPIME: SIGNIFICANT CHANGE UP
-  CEFOXITIN: SIGNIFICANT CHANGE UP
-  CEFTRIAXONE: SIGNIFICANT CHANGE UP
-  CIPROFLOXACIN: SIGNIFICANT CHANGE UP
-  GENTAMICIN: SIGNIFICANT CHANGE UP
-  LEVOFLOXACIN: SIGNIFICANT CHANGE UP
-  PIPERACILLIN/TAZOBACTAM: SIGNIFICANT CHANGE UP
-  TOBRAMYCIN: SIGNIFICANT CHANGE UP
-  TRIMETHOPRIM/SULFAMETHOXAZOLE: SIGNIFICANT CHANGE UP
CULTURE RESULTS: ABNORMAL
METHOD TYPE: SIGNIFICANT CHANGE UP
ORGANISM # SPEC MICROSCOPIC CNT: ABNORMAL
ORGANISM # SPEC MICROSCOPIC CNT: ABNORMAL
ORGANISM # SPEC MICROSCOPIC CNT: SIGNIFICANT CHANGE UP
SPECIMEN SOURCE: SIGNIFICANT CHANGE UP

## 2024-05-19 LAB
CULTURE RESULTS: ABNORMAL
CULTURE RESULTS: SIGNIFICANT CHANGE UP
SPECIMEN SOURCE: SIGNIFICANT CHANGE UP
SPECIMEN SOURCE: SIGNIFICANT CHANGE UP

## 2024-05-22 DIAGNOSIS — T85.79XA INFECTION AND INFLAMMATORY REACTION DUE TO OTHER INTERNAL PROSTHETIC DEVICES, IMPLANTS AND GRAFTS, INITIAL ENCOUNTER: ICD-10-CM

## 2024-05-22 DIAGNOSIS — A41.50 GRAM-NEGATIVE SEPSIS, UNSPECIFIED: ICD-10-CM

## 2024-05-22 DIAGNOSIS — M10.9 GOUT, UNSPECIFIED: ICD-10-CM

## 2024-05-22 DIAGNOSIS — Z98.42 CATARACT EXTRACTION STATUS, LEFT EYE: ICD-10-CM
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
## 2024-05-22 DIAGNOSIS — R65.20 SEVERE SEPSIS WITHOUT SEPTIC SHOCK: ICD-10-CM

## 2024-05-22 DIAGNOSIS — K80.33 CALCULUS OF BILE DUCT WITH ACUTE CHOLANGITIS WITH OBSTRUCTION: ICD-10-CM

## 2024-05-22 DIAGNOSIS — T85.520A DISPLACEMENT OF BILE DUCT PROSTHESIS, INITIAL ENCOUNTER: ICD-10-CM

## 2024-05-22 DIAGNOSIS — A41.9 SEPSIS, UNSPECIFIED ORGANISM: ICD-10-CM

## 2024-05-22 DIAGNOSIS — I10 ESSENTIAL (PRIMARY) HYPERTENSION: ICD-10-CM
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
## 2024-06-11 ENCOUNTER — APPOINTMENT (OUTPATIENT)
Dept: GASTROENTEROLOGY | Facility: CLINIC | Age: 89
End: 2024-06-11
Payer: MEDICARE

## 2024-06-11 ENCOUNTER — NON-APPOINTMENT (OUTPATIENT)
Age: 89
End: 2024-06-11

## 2024-06-11 VITALS
HEART RATE: 79 BPM | BODY MASS INDEX: 31.65 KG/M2 | WEIGHT: 172 LBS | HEIGHT: 62 IN | SYSTOLIC BLOOD PRESSURE: 135 MMHG | OXYGEN SATURATION: 97 % | TEMPERATURE: 97.7 F | DIASTOLIC BLOOD PRESSURE: 85 MMHG | RESPIRATION RATE: 17 BRPM

## 2024-06-11 DIAGNOSIS — Z80.0 FAMILY HISTORY OF MALIGNANT NEOPLASM OF DIGESTIVE ORGANS: ICD-10-CM

## 2024-06-11 DIAGNOSIS — R79.89 OTHER SPECIFIED ABNORMAL FINDINGS OF BLOOD CHEMISTRY: ICD-10-CM

## 2024-06-11 DIAGNOSIS — Z87.39 PERSONAL HISTORY OF OTHER DISEASES OF THE MUSCULOSKELETAL SYSTEM AND CONNECTIVE TISSUE: ICD-10-CM

## 2024-06-11 DIAGNOSIS — Z86.69 PERSONAL HISTORY OF OTHER DISEASES OF THE NERVOUS SYSTEM AND SENSE ORGANS: ICD-10-CM

## 2024-06-11 DIAGNOSIS — K80.50 CALCULUS OF BILE DUCT W/OUT CHOLANGITIS OR CHOLECYSTITIS W/OUT OBSTRUCTION: ICD-10-CM

## 2024-06-11 DIAGNOSIS — Z86.79 PERSONAL HISTORY OF OTHER DISEASES OF THE CIRCULATORY SYSTEM: ICD-10-CM

## 2024-06-11 DIAGNOSIS — Z78.9 OTHER SPECIFIED HEALTH STATUS: ICD-10-CM

## 2024-06-11 DIAGNOSIS — Z98.890 OTHER SPECIFIED POSTPROCEDURAL STATES: ICD-10-CM

## 2024-06-11 PROCEDURE — 99214 OFFICE O/P EST MOD 30 MIN: CPT

## 2024-06-11 RX ORDER — METOPROLOL SUCCINATE 50 MG/1
50 TABLET, EXTENDED RELEASE ORAL
Refills: 0 | Status: ACTIVE | COMMUNITY

## 2024-06-11 RX ORDER — AMLODIPINE BESYLATE 10 MG/1
10 TABLET ORAL
Refills: 0 | Status: ACTIVE | COMMUNITY

## 2024-06-11 RX ORDER — ALLOPURINOL 100 MG/1
100 TABLET ORAL
Refills: 0 | Status: ACTIVE | COMMUNITY

## 2024-06-12 ENCOUNTER — NON-APPOINTMENT (OUTPATIENT)
Age: 89
End: 2024-06-12

## 2024-06-12 LAB
ALBUMIN SERPL ELPH-MCNC: 3.9 G/DL
ALP BLD-CCNC: 146 U/L
ALT SERPL-CCNC: 14 U/L
ANION GAP SERPL CALC-SCNC: 15 MMOL/L
AST SERPL-CCNC: 21 U/L
BASOPHILS # BLD AUTO: 0.07 K/UL
BASOPHILS NFR BLD AUTO: 0.9 %
BILIRUB SERPL-MCNC: 0.4 MG/DL
BUN SERPL-MCNC: 14 MG/DL
CALCIUM SERPL-MCNC: 9 MG/DL
CHLORIDE SERPL-SCNC: 104 MMOL/L
CO2 SERPL-SCNC: 18 MMOL/L
CREAT SERPL-MCNC: 0.9 MG/DL
EGFR: 59 ML/MIN/1.73M2
EOSINOPHIL # BLD AUTO: 0.15 K/UL
EOSINOPHIL NFR BLD AUTO: 1.9 %
GLUCOSE SERPL-MCNC: 117 MG/DL
HCT VFR BLD CALC: 44.1 %
HGB BLD-MCNC: 14.3 G/DL
IMM GRANULOCYTES NFR BLD AUTO: 0.5 %
LYMPHOCYTES # BLD AUTO: 2.67 K/UL
LYMPHOCYTES NFR BLD AUTO: 33.5 %
MAN DIFF?: NORMAL
MCHC RBC-ENTMCNC: 28.8 PG
MCHC RBC-ENTMCNC: 32.4 GM/DL
MCV RBC AUTO: 88.7 FL
MONOCYTES # BLD AUTO: 0.89 K/UL
MONOCYTES NFR BLD AUTO: 11.2 %
NEUTROPHILS # BLD AUTO: 4.15 K/UL
NEUTROPHILS NFR BLD AUTO: 52 %
PLATELET # BLD AUTO: 415 K/UL
POTASSIUM SERPL-SCNC: 4.6 MMOL/L
PROT SERPL-MCNC: 8.1 G/DL
RBC # BLD: 4.97 M/UL
RBC # FLD: 16 %
SODIUM SERPL-SCNC: 138 MMOL/L
WBC # FLD AUTO: 7.97 K/UL

## 2024-06-12 NOTE — ASSESSMENT
[FreeTextEntry1] : Patient is a 89 year old female with a history of HTN, gout and choledocholithiasis who presents for evaluation post hospital discharge.  CBC and CMP completed at office visit today.  Patient to undergo ERCP with stent removal at [facility].  R/B/C of procedure discussed with patient.  Escort for the procedure also reviewed.  Cristi FONTENOT; PA, am scribing for and in the presence of Dr. Francisco J Burch the following sections: history of present illness, past medical/family/social history; review of systems; vital signs; physical exam; disposition.  Francisco J FONTENOT MD, personally performed the services described in the documentation, reviewed the documentation recorded by the scribe in my presence and it accurately and completely records my words and actions.

## 2024-06-12 NOTE — PHYSICAL EXAM
[Alert] : alert [No Respiratory Distress] : no respiratory distress [Abdomen Tenderness] : non-tender [Abdomen Soft] : soft [Oriented To Time, Place, And Person] : oriented to person, place, and time [de-identified] : ambulating with a walker

## 2024-06-12 NOTE — HISTORY OF PRESENT ILLNESS
[FreeTextEntry1] : Patient is a 89 year old female with a history of HTN, gout and choledocholithiasis who presents for evaluation post hospital discharge.  Patient presented to [facility] on 05/12/2024 due to abdominal pain and nausea.  Lab evaluation showed alk phos of 184, AST of 71, ALT of 110 and WBC of 19.05.  Patient underwent CT abdomen and pelvis with contrast on 5/12/2024 that showed CBVD stent migrated into the lower CBD with moderate biliary dilatation and choledocholithiasis.  Patient previously had obstructive gallstone pancreatitis in 08/2023.  Patient underwent ERCP on 08/22/2023 that showed moderately dilated duct with multiple filling defects s/p shincterotomy with biliary stent placement.    Patient underwent a second ERCP on 05/13/2024 that showed multiple filling defects s/p sphincterotomy, stone removal and placement of two 7Fr 6 cm double pigtail stents.  Patient was treated with antibiotics and discharged.  Patient presents today stating that she has not had any abdominal pain since discharge.  She states that she feels some fatigue.  Patient states that she did not undergo cholecystectomy in 2023 due to her age and possible complications associated with the surgery.  Patient denies fever, nausea, vomiting, abdominal pain, changes in bowel habits, dark stool and BRBPR.    Patient reports a positive FH of pancreatic cancer in her sister.  Patient denies family history of colon and gastric cancer. [de-identified] : 08/22/2023 showed moderately dilated duct with multiple filling defects s/p shincterotomy with biliary stent placement.   05/13/2024 that showed multiple filling defects s/p sphincterotomy, stone removal and placement of two 7Fr 6 cm double pigtail stents. [de-identified] : CT abdomen and pelvis with contrast on 8/22/2023 showed choledocholithiasis with associated mild intrahepatic and extrahepatic biliary ductal dilation.   CT abdomen and pelvis with contrast on 5/12/2024 that showed CBVD stent migrated into the lower CBDwith moderate biliary dilatation and choledocholithiasis.

## 2024-07-18 NOTE — CONSULT NOTE ADULT - ATTENDING COMMENTS
amiodarone 400 mg oral tablet: 1 tab(s) orally once a day  calcitriol 0.25 mcg oral capsule: 1 cap(s) orally once a day  divalproex sodium 250 mg oral delayed release tablet: 1 tab(s) orally 3 times a day  Eliquis 5 mg oral tablet: 1 tab(s) orally 2 times a day  folic acid 1 mg oral tablet: 1 tab(s) orally once a day  levETIRAcetam 750 mg oral tablet: 1 tab(s) orally once a day  mirtazapine 7.5 mg oral tablet: 1 tab(s) orally once a day (at bedtime)  tamsulosin 0.4 mg oral capsule: 1 cap(s) orally once a day   Pt seen and d/w fellow.  Pt cholangitic, for ERCP with stent exchange today. amiodarone 400 mg oral tablet: 1 tab(s) orally once a day  calcitriol 0.25 mcg oral capsule: 1 cap(s) orally once a day  divalproex sodium 250 mg oral delayed release tablet: 1 tab(s) orally 3 times a day  Eliquis 5 mg oral tablet: 1 tab(s) orally 2 times a day  folic acid 1 mg oral tablet: 1 tab(s) orally once a day  levETIRAcetam 750 mg oral tablet: 1 tab(s) orally once a day  mesalamine 1000 mg rectal suppository: 1 suppository(ies) rectal once a day (at bedtime)  mirtazapine 7.5 mg oral tablet: 1 tab(s) orally once a day (at bedtime)  Protonix 40 mg oral delayed release tablet: 1 tab(s) orally once a day  tamsulosin 0.4 mg oral capsule: 1 cap(s) orally once a day

## 2024-07-31 NOTE — ED ADULT TRIAGE NOTE - PAIN: PRESENCE, MLM
History of CAD and anxiety sent to the hospital by PCP found to have sepsis secondary to UTI.  Patient initially thought he had diverticulitis  Endorses dysuria, has remained afebrile, leukocytosis has resolved   Continue IV fluids patient reports poor PO intake   Urine Culture- mixed contaminates   Blood Cultures- no growth at 24 hours   Continue IV Ceftriaxone till cultures result    complains of pain/discomfort

## 2024-08-05 ENCOUNTER — NON-APPOINTMENT (OUTPATIENT)
Age: 89
End: 2024-08-05

## 2024-08-08 ENCOUNTER — APPOINTMENT (OUTPATIENT)
Dept: GASTROENTEROLOGY | Facility: HOSPITAL | Age: 89
End: 2024-08-08

## 2024-08-08 ENCOUNTER — OUTPATIENT (OUTPATIENT)
Dept: OUTPATIENT SERVICES | Facility: HOSPITAL | Age: 89
LOS: 1 days | Discharge: HOME | End: 2024-08-08
Payer: MEDICARE

## 2024-08-08 VITALS
HEART RATE: 91 BPM | OXYGEN SATURATION: 96 % | WEIGHT: 176.37 LBS | HEIGHT: 62 IN | TEMPERATURE: 97 F | RESPIRATION RATE: 17 BRPM | DIASTOLIC BLOOD PRESSURE: 84 MMHG | SYSTOLIC BLOOD PRESSURE: 148 MMHG

## 2024-08-08 DIAGNOSIS — S82.892A OTHER FRACTURE OF LEFT LOWER LEG, INITIAL ENCOUNTER FOR CLOSED FRACTURE: Chronic | ICD-10-CM

## 2024-08-08 DIAGNOSIS — Z98.49 CATARACT EXTRACTION STATUS, UNSPECIFIED EYE: Chronic | ICD-10-CM

## 2024-08-08 PROCEDURE — C1769: CPT

## 2024-08-08 PROCEDURE — 43264 ERCP REMOVE DUCT CALCULI: CPT | Mod: 59

## 2024-08-08 PROCEDURE — 43264 ERCP REMOVE DUCT CALCULI: CPT

## 2024-08-08 PROCEDURE — 43275 ERCP REMOVE FORGN BODY DUCT: CPT | Mod: 59

## 2024-08-08 PROCEDURE — 74328 X-RAY BILE DUCT ENDOSCOPY: CPT

## 2024-08-08 PROCEDURE — C1889: CPT

## 2024-08-08 PROCEDURE — 43275 ERCP REMOVE FORGN BODY DUCT: CPT

## 2024-08-08 PROCEDURE — 74328 X-RAY BILE DUCT ENDOSCOPY: CPT | Mod: 26,59

## 2024-08-08 DEVICE — HYDRATOME 44: Type: IMPLANTABLE DEVICE | Status: FUNCTIONAL

## 2024-08-08 DEVICE — BLLN EXTRCTR PRO RX-S 9-12MM: Type: IMPLANTABLE DEVICE | Status: FUNCTIONAL

## 2024-08-08 DEVICE — BLLN EXTRCTR PRO RX-S 15-18MM: Type: IMPLANTABLE DEVICE | Status: FUNCTIONAL

## 2024-08-08 DEVICE — BLLN EXTRCTR PRO RX-S 12-15MM: Type: IMPLANTABLE DEVICE | Status: FUNCTIONAL

## 2024-08-08 NOTE — PRE-ANESTHESIA EVALUATION ADULT - NSRADCARDRESULTSFT_GEN_ALL_CORE
Outside chart/data reviewed prior to procedure. EKG 8/22/2023: NSR, 1st degree av block, qtc 470  TTE 5/2020: 1. Small left ventricular cavity size.2. Normal left ventricular  systolic function.3. Grade I left ventricular diastolic dysfunction without  elevated filling pressure. 4. Moderate symmetric left ventricular hypertrophy  5. Normal right ventricular size and systolic function. 6. No significant  valvular disease. 7. Pulmonary hypertension present, pulmonary artery systolic  pressure is 52 mmHg. 8. No pericardial effusion.  CCTA 2020: The calcium score is mild at 71 Agatston units. Non-obstructive  coronary artery disease.Dilated main PA, measuring 3.8cm x 3.6cm. Enlarged RV  with flattening of the interventricular septum. The calculated LVEF is 75% with  normal wall motion and wall thickness.  NST 2018: nl

## 2024-08-08 NOTE — PRE-ANESTHESIA EVALUATION ADULT - NSANTHPMHFT_GEN_ALL_CORE
89F c HTN, heart murmur, elevated LFT Hx, Hx of sepsis. PSH: Cataract lens replacement, left ankle fracture repair, 89F c HTN, arrythmia, gout  PSH: Cataract lens replacement, left ankle fracture repair, 89F c HTN, arrythmia, gout  PSH: Cataract lens replacement, left ankle fracture repair

## 2024-09-02 ENCOUNTER — EMERGENCY (EMERGENCY)
Facility: HOSPITAL | Age: 89
LOS: 1 days | Discharge: HOME | End: 2024-09-02
Attending: EMERGENCY MEDICINE | Admitting: EMERGENCY MEDICINE
Payer: MEDICARE

## 2024-09-02 VITALS
HEART RATE: 81 BPM | TEMPERATURE: 98 F | DIASTOLIC BLOOD PRESSURE: 108 MMHG | RESPIRATION RATE: 20 BRPM | SYSTOLIC BLOOD PRESSURE: 195 MMHG | WEIGHT: 160.06 LBS | HEIGHT: 62 IN | OXYGEN SATURATION: 99 %

## 2024-09-02 VITALS
RESPIRATION RATE: 18 BRPM | HEART RATE: 80 BPM | TEMPERATURE: 98 F | OXYGEN SATURATION: 98 % | DIASTOLIC BLOOD PRESSURE: 80 MMHG | SYSTOLIC BLOOD PRESSURE: 168 MMHG

## 2024-09-02 DIAGNOSIS — R53.1 WEAKNESS: ICD-10-CM

## 2024-09-02 DIAGNOSIS — R05.9 COUGH, UNSPECIFIED: ICD-10-CM

## 2024-09-02 DIAGNOSIS — S82.892A OTHER FRACTURE OF LEFT LOWER LEG, INITIAL ENCOUNTER FOR CLOSED FRACTURE: Chronic | ICD-10-CM

## 2024-09-02 DIAGNOSIS — Z20.822 CONTACT WITH AND (SUSPECTED) EXPOSURE TO COVID-19: ICD-10-CM

## 2024-09-02 DIAGNOSIS — Z98.49 CATARACT EXTRACTION STATUS, UNSPECIFIED EYE: Chronic | ICD-10-CM

## 2024-09-02 DIAGNOSIS — M10.9 GOUT, UNSPECIFIED: ICD-10-CM

## 2024-09-02 DIAGNOSIS — Z98.890 OTHER SPECIFIED POSTPROCEDURAL STATES: ICD-10-CM

## 2024-09-02 DIAGNOSIS — Z88.1 ALLERGY STATUS TO OTHER ANTIBIOTIC AGENTS: ICD-10-CM

## 2024-09-02 DIAGNOSIS — I10 ESSENTIAL (PRIMARY) HYPERTENSION: ICD-10-CM

## 2024-09-02 LAB
ADD ON TEST-SPECIMEN IN LAB: SIGNIFICANT CHANGE UP
ANION GAP SERPL CALC-SCNC: 11 MMOL/L — SIGNIFICANT CHANGE UP (ref 5–17)
APPEARANCE UR: CLEAR — SIGNIFICANT CHANGE UP
BASOPHILS # BLD AUTO: 0.04 K/UL — SIGNIFICANT CHANGE UP (ref 0–0.2)
BASOPHILS NFR BLD AUTO: 0.6 % — SIGNIFICANT CHANGE UP (ref 0–2)
BILIRUB UR-MCNC: NEGATIVE — SIGNIFICANT CHANGE UP
BUN SERPL-MCNC: 11 MG/DL — SIGNIFICANT CHANGE UP (ref 7–23)
CALCIUM SERPL-MCNC: 9.2 MG/DL — SIGNIFICANT CHANGE UP (ref 8.4–10.5)
CHLORIDE SERPL-SCNC: 103 MMOL/L — SIGNIFICANT CHANGE UP (ref 96–108)
CO2 SERPL-SCNC: 22 MMOL/L — SIGNIFICANT CHANGE UP (ref 22–31)
COLOR SPEC: YELLOW — SIGNIFICANT CHANGE UP
CREAT SERPL-MCNC: 0.78 MG/DL — SIGNIFICANT CHANGE UP (ref 0.5–1.3)
DIFF PNL FLD: NEGATIVE — SIGNIFICANT CHANGE UP
EGFR: 70 ML/MIN/1.73M2 — SIGNIFICANT CHANGE UP
EOSINOPHIL # BLD AUTO: 0.07 K/UL — SIGNIFICANT CHANGE UP (ref 0–0.5)
EOSINOPHIL NFR BLD AUTO: 1.1 % — SIGNIFICANT CHANGE UP (ref 0–6)
FLUAV AG NPH QL: SIGNIFICANT CHANGE UP
FLUBV AG NPH QL: SIGNIFICANT CHANGE UP
GLUCOSE SERPL-MCNC: 138 MG/DL — HIGH (ref 70–99)
GLUCOSE UR QL: NEGATIVE MG/DL — SIGNIFICANT CHANGE UP
HCT VFR BLD CALC: 42.7 % — SIGNIFICANT CHANGE UP (ref 34.5–45)
HGB BLD-MCNC: 13.8 G/DL — SIGNIFICANT CHANGE UP (ref 11.5–15.5)
IMM GRANULOCYTES NFR BLD AUTO: 0.5 % — SIGNIFICANT CHANGE UP (ref 0–0.9)
KETONES UR-MCNC: NEGATIVE MG/DL — SIGNIFICANT CHANGE UP
LEUKOCYTE ESTERASE UR-ACNC: NEGATIVE — SIGNIFICANT CHANGE UP
LYMPHOCYTES # BLD AUTO: 2.08 K/UL — SIGNIFICANT CHANGE UP (ref 1–3.3)
LYMPHOCYTES # BLD AUTO: 31.6 % — SIGNIFICANT CHANGE UP (ref 13–44)
MCHC RBC-ENTMCNC: 28.1 PG — SIGNIFICANT CHANGE UP (ref 27–34)
MCHC RBC-ENTMCNC: 32.3 GM/DL — SIGNIFICANT CHANGE UP (ref 32–36)
MCV RBC AUTO: 87 FL — SIGNIFICANT CHANGE UP (ref 80–100)
MONOCYTES # BLD AUTO: 0.68 K/UL — SIGNIFICANT CHANGE UP (ref 0–0.9)
MONOCYTES NFR BLD AUTO: 10.3 % — SIGNIFICANT CHANGE UP (ref 2–14)
NEUTROPHILS # BLD AUTO: 3.68 K/UL — SIGNIFICANT CHANGE UP (ref 1.8–7.4)
NEUTROPHILS NFR BLD AUTO: 55.9 % — SIGNIFICANT CHANGE UP (ref 43–77)
NITRITE UR-MCNC: NEGATIVE — SIGNIFICANT CHANGE UP
NRBC # BLD: 0 /100 WBCS — SIGNIFICANT CHANGE UP (ref 0–0)
PH UR: 7.5 — SIGNIFICANT CHANGE UP (ref 5–8)
PLATELET # BLD AUTO: 354 K/UL — SIGNIFICANT CHANGE UP (ref 150–400)
POTASSIUM SERPL-MCNC: 5.1 MMOL/L — SIGNIFICANT CHANGE UP (ref 3.5–5.3)
POTASSIUM SERPL-MCNC: SIGNIFICANT CHANGE UP MMOL/L (ref 3.5–5.3)
POTASSIUM SERPL-SCNC: 5.1 MMOL/L — SIGNIFICANT CHANGE UP (ref 3.5–5.3)
POTASSIUM SERPL-SCNC: SIGNIFICANT CHANGE UP MMOL/L (ref 3.5–5.3)
PROT UR-MCNC: NEGATIVE MG/DL — SIGNIFICANT CHANGE UP
RBC # BLD: 4.91 M/UL — SIGNIFICANT CHANGE UP (ref 3.8–5.2)
RBC # FLD: 15.7 % — HIGH (ref 10.3–14.5)
RSV RNA NPH QL NAA+NON-PROBE: SIGNIFICANT CHANGE UP
SARS-COV-2 RNA SPEC QL NAA+PROBE: SIGNIFICANT CHANGE UP
SODIUM SERPL-SCNC: 136 MMOL/L — SIGNIFICANT CHANGE UP (ref 135–145)
SP GR SPEC: 1.01 — SIGNIFICANT CHANGE UP (ref 1–1.03)
TROPONIN T, HIGH SENSITIVITY RESULT: 10 NG/L — SIGNIFICANT CHANGE UP (ref 0–51)
UROBILINOGEN FLD QL: 0.2 MG/DL — SIGNIFICANT CHANGE UP (ref 0.2–1)
WBC # BLD: 6.58 K/UL — SIGNIFICANT CHANGE UP (ref 3.8–10.5)
WBC # FLD AUTO: 6.58 K/UL — SIGNIFICANT CHANGE UP (ref 3.8–10.5)

## 2024-09-02 PROCEDURE — 99284 EMERGENCY DEPT VISIT MOD MDM: CPT | Mod: 25

## 2024-09-02 PROCEDURE — 84132 ASSAY OF SERUM POTASSIUM: CPT

## 2024-09-02 PROCEDURE — 99285 EMERGENCY DEPT VISIT HI MDM: CPT

## 2024-09-02 PROCEDURE — 84484 ASSAY OF TROPONIN QUANT: CPT

## 2024-09-02 PROCEDURE — 81003 URINALYSIS AUTO W/O SCOPE: CPT

## 2024-09-02 PROCEDURE — 36415 COLL VENOUS BLD VENIPUNCTURE: CPT

## 2024-09-02 PROCEDURE — 85025 COMPLETE CBC W/AUTO DIFF WBC: CPT

## 2024-09-02 PROCEDURE — 71045 X-RAY EXAM CHEST 1 VIEW: CPT | Mod: 26

## 2024-09-02 PROCEDURE — 83880 ASSAY OF NATRIURETIC PEPTIDE: CPT

## 2024-09-02 PROCEDURE — 71045 X-RAY EXAM CHEST 1 VIEW: CPT

## 2024-09-02 PROCEDURE — 85379 FIBRIN DEGRADATION QUANT: CPT

## 2024-09-02 PROCEDURE — 87637 SARSCOV2&INF A&B&RSV AMP PRB: CPT

## 2024-09-02 PROCEDURE — 80048 BASIC METABOLIC PNL TOTAL CA: CPT

## 2024-09-02 RX ORDER — METOPROLOL TARTRATE 100 MG/1
100 TABLET ORAL ONCE
Refills: 0 | Status: DISCONTINUED | OUTPATIENT
Start: 2024-09-02 | End: 2024-09-02

## 2024-09-02 RX ORDER — GUAIFENESIN/DEXTROMETHORPHAN 100-10MG/5
10 SYRUP ORAL ONCE
Refills: 0 | Status: COMPLETED | OUTPATIENT
Start: 2024-09-02 | End: 2024-09-02

## 2024-09-02 RX ORDER — METOPROLOL TARTRATE 100 MG/1
50 TABLET ORAL ONCE
Refills: 0 | Status: COMPLETED | OUTPATIENT
Start: 2024-09-02 | End: 2024-09-02

## 2024-09-02 RX ORDER — AMLODIPINE BESYLATE 10 MG/1
10 TABLET ORAL ONCE
Refills: 0 | Status: COMPLETED | OUTPATIENT
Start: 2024-09-02 | End: 2024-09-02

## 2024-09-02 RX ADMIN — AMLODIPINE BESYLATE 10 MILLIGRAM(S): 10 TABLET ORAL at 12:09

## 2024-09-02 RX ADMIN — Medication 10 MILLILITER(S): at 14:30

## 2024-09-02 RX ADMIN — METOPROLOL TARTRATE 50 MILLIGRAM(S): 100 TABLET ORAL at 12:09

## 2024-09-02 NOTE — ED ADULT TRIAGE NOTE - CHIEF COMPLAINT QUOTE
Pt presents to ED C/O worsening SOB today and bilateral lower leg " burning pain" x 1 week. Pt family at bedside states, " She had a biliary stent removed about 3 weeks ago here". Denies CP. Hx HTN. States, " I didn't take my BP medication this morning".  Pt on 2L by EMS on arrival  EKG in progress.

## 2024-09-02 NOTE — ED ADULT NURSE NOTE - OBJECTIVE STATEMENT
Pt A&Ox4 hx HTN, gout, presents to ED with complaints of shortness of breath since yesterday. Pt reports productive cough with clear sputum x a few days. Also reports bilateral "shooting" leg pain x weeks. Endorses urinary frequency, denies burning with urination. EKG obtained. Placed on continuous cardiac monitor. SpO2 98% on room air. Denies chest pain, fevers/chills, n/v, abdominal pain, diarrhea, HA, changes in vision, numbness/tingling. Reports she did not take HTN meds today. Lives at home with grandson, has aid at bedside. Uses walker to ambulate. Pt A&Ox4 hx HTN, gout, presents to ED with complaints of shortness of breath since yesterday. Pt reports productive cough with clear sputum x a few days. Also reports bilateral "shooting" leg pain x weeks. Endorses urinary frequency, denies burning with urination. EKG obtained. Placed on continuous cardiac monitor. SpO2 98% on room air. Denies chest pain, fevers/chills, n/v, abdominal pain, diarrhea, HA, changes in vision, numbness/tingling. Reports she did not take HTN meds today. Lives at home with grandson, has aid at bedside. Uses walker to ambulate. Pt hard of hearing.

## 2024-09-02 NOTE — ED PROVIDER NOTE - CLINICAL SUMMARY MEDICAL DECISION MAKING FREE TEXT BOX
Patient with multiple complaints, including shortness of breath, weakness, fatigue, fever after stent placement 3 weeks ago.  Patient with no abdominal pain, afebrile here.  No active chest pain, shortness of breath, very well-appearing.  Differential including ACS, PE, URI, UTI, pneumonia, other.  Less likely acute intra-abdominal process given no abdominal pain.  Will obtain labs chest x-ray swab EKG and reassess.

## 2024-09-02 NOTE — ED PROVIDER NOTE - NSFOLLOWUPINSTRUCTIONS_ED_ALL_ED_FT
Please follow-up with your primary care doctor in 1 to 2 days, return to the ED for any worsening or concerning symptoms    [facility]  Family Medicine  [address], [address] [place] [address]  Phone: [phone]

## 2024-09-02 NOTE — ED PROVIDER NOTE - PHYSICAL EXAMINATION

## 2024-09-02 NOTE — ED PROVIDER NOTE - PATIENT PORTAL LINK FT
You can access the FollowMyHealth Patient Portal offered by [facility] by registering at the following website: [email or web address]. By joining [employer]’s FollowMyHealth portal, you will also be able to view your health information using other applications (apps) compatible with our system.

## 2024-09-02 NOTE — ED PROVIDER NOTE - OBJECTIVE STATEMENT
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  Patient states since yesterday she had been feeling short of breath, symptoms now resolved.  Denies associated chest pain, abdominal pain, nausea, vomiting, fever, chills.  Patient states some recent cough and leg pain.  Denies any swelling of lower extremities, states she has been feeling weak ever since stent placement. 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  Patient states since yesterday she had been feeling short of breath, symptoms now resolved.  Denies associated chest pain, abdominal pain, nausea, vomiting, fever, chills.  Patient states some recent cough and leg pain.  Denies any swelling of lower extremities, states she has been feeling weak ever since stent placement. describes leg pain as burning in sensation on left leg, no extremity swelling or weakness. Pt states did not  take meds today.

## 2024-09-02 NOTE — ED ADULT NURSE NOTE - NSFALLHARMRISKINTERV_ED_ALL_ED
Assistance OOB with selected safe patient handling equipment if applicable/Assistance with ambulation/Communicate risk of Fall with Harm to all staff, patient, and family/Monitor gait and stability/Provide visual cue: red socks, yellow wristband, yellow gown, etc/Reinforce activity limits and safety measures with patient and family/Bed in lowest position, wheels locked, appropriate side rails in place/Call bell, personal items and telephone in reach/Instruct patient to call for assistance before getting out of bed/chair/stretcher/Non-slip footwear applied when patient is off stretcher/[place] to call system/Physically safe environment - no spills, clutter or unnecessary equipment/Purposeful Proactive Rounding/Room/bathroom lighting operational, light cord in reach Assistance OOB with selected safe patient handling equipment if applicable/Assistance with ambulation/Communicate risk of Fall with Harm to all staff, patient, and family/Monitor gait and stability/Provide patient with walking aids/Provide visual cue: red socks, yellow wristband, yellow gown, etc/Reinforce activity limits and safety measures with patient and family/Bed in lowest position, wheels locked, appropriate side rails in place/Call bell, personal items and telephone in reach/Instruct patient to call for assistance before getting out of bed/chair/stretcher/Non-slip footwear applied when patient is off stretcher/[place] to call system/Physically safe environment - no spills, clutter or unnecessary equipment/Purposeful Proactive Rounding/Room/bathroom lighting operational, light cord in reach

## 2024-09-03 ENCOUNTER — APPOINTMENT (OUTPATIENT)
Dept: GASTROENTEROLOGY | Facility: CLINIC | Age: 89
End: 2024-09-03

## 2024-11-27 ENCOUNTER — INPATIENT (INPATIENT)
Facility: HOSPITAL | Age: 88
LOS: 0 days | Discharge: HOME HEALTH | End: 2024-11-28
Attending: STUDENT IN AN ORGANIZED HEALTH CARE EDUCATION/TRAINING PROGRAM | Admitting: STUDENT IN AN ORGANIZED HEALTH CARE EDUCATION/TRAINING PROGRAM
Payer: MEDICARE

## 2024-11-27 VITALS
SYSTOLIC BLOOD PRESSURE: 193 MMHG | WEIGHT: 190.04 LBS | OXYGEN SATURATION: 97 % | HEART RATE: 82 BPM | HEIGHT: 62 IN | TEMPERATURE: 97 F | RESPIRATION RATE: 16 BRPM | DIASTOLIC BLOOD PRESSURE: 103 MMHG

## 2024-11-27 DIAGNOSIS — S82.892A OTHER FRACTURE OF LEFT LOWER LEG, INITIAL ENCOUNTER FOR CLOSED FRACTURE: Chronic | ICD-10-CM

## 2024-11-27 DIAGNOSIS — Z98.49 CATARACT EXTRACTION STATUS, UNSPECIFIED EYE: Chronic | ICD-10-CM

## 2024-11-27 LAB
ALBUMIN SERPL ELPH-MCNC: 3.9 G/DL — SIGNIFICANT CHANGE UP (ref 3.3–5)
ALP SERPL-CCNC: SIGNIFICANT CHANGE UP (ref 40–120)
ALT FLD-CCNC: SIGNIFICANT CHANGE UP (ref 10–45)
ANION GAP SERPL CALC-SCNC: 13 MMOL/L — SIGNIFICANT CHANGE UP (ref 5–17)
APPEARANCE UR: CLEAR — SIGNIFICANT CHANGE UP
APTT BLD: 26.3 SEC — SIGNIFICANT CHANGE UP (ref 24.5–35.6)
AST SERPL-CCNC: SIGNIFICANT CHANGE UP (ref 10–40)
BASOPHILS # BLD AUTO: 0.03 K/UL — SIGNIFICANT CHANGE UP (ref 0–0.2)
BASOPHILS NFR BLD AUTO: 0.4 % — SIGNIFICANT CHANGE UP (ref 0–2)
BILIRUB SERPL-MCNC: 0.4 MG/DL — SIGNIFICANT CHANGE UP (ref 0.2–1.2)
BILIRUB UR-MCNC: NEGATIVE — SIGNIFICANT CHANGE UP
BUN SERPL-MCNC: 12 MG/DL — SIGNIFICANT CHANGE UP (ref 7–23)
CALCIUM SERPL-MCNC: 9.2 MG/DL — SIGNIFICANT CHANGE UP (ref 8.4–10.5)
CHLORIDE SERPL-SCNC: 102 MMOL/L — SIGNIFICANT CHANGE UP (ref 96–108)
CO2 SERPL-SCNC: 22 MMOL/L — SIGNIFICANT CHANGE UP (ref 22–31)
COLOR SPEC: YELLOW — SIGNIFICANT CHANGE UP
CREAT SERPL-MCNC: 0.73 MG/DL — SIGNIFICANT CHANGE UP (ref 0.5–1.3)
DIFF PNL FLD: NEGATIVE — SIGNIFICANT CHANGE UP
EGFR: 76 ML/MIN/1.73M2 — SIGNIFICANT CHANGE UP
EOSINOPHIL # BLD AUTO: 0.11 K/UL — SIGNIFICANT CHANGE UP (ref 0–0.5)
EOSINOPHIL NFR BLD AUTO: 1.4 % — SIGNIFICANT CHANGE UP (ref 0–6)
GLUCOSE SERPL-MCNC: 119 MG/DL — HIGH (ref 70–99)
GLUCOSE UR QL: NEGATIVE MG/DL — SIGNIFICANT CHANGE UP
HCT VFR BLD CALC: 45.6 % — HIGH (ref 34.5–45)
HGB BLD-MCNC: 15 G/DL — SIGNIFICANT CHANGE UP (ref 11.5–15.5)
IMM GRANULOCYTES NFR BLD AUTO: 0.5 % — SIGNIFICANT CHANGE UP (ref 0–0.9)
INR BLD: 1.02 — SIGNIFICANT CHANGE UP (ref 0.85–1.16)
KETONES UR-MCNC: NEGATIVE MG/DL — SIGNIFICANT CHANGE UP
LEUKOCYTE ESTERASE UR-ACNC: NEGATIVE — SIGNIFICANT CHANGE UP
LYMPHOCYTES # BLD AUTO: 1.77 K/UL — SIGNIFICANT CHANGE UP (ref 1–3.3)
LYMPHOCYTES # BLD AUTO: 22.3 % — SIGNIFICANT CHANGE UP (ref 13–44)
MCHC RBC-ENTMCNC: 29 PG — SIGNIFICANT CHANGE UP (ref 27–34)
MCHC RBC-ENTMCNC: 32.9 G/DL — SIGNIFICANT CHANGE UP (ref 32–36)
MCV RBC AUTO: 88 FL — SIGNIFICANT CHANGE UP (ref 80–100)
MONOCYTES # BLD AUTO: 0.67 K/UL — SIGNIFICANT CHANGE UP (ref 0–0.9)
MONOCYTES NFR BLD AUTO: 8.4 % — SIGNIFICANT CHANGE UP (ref 2–14)
NEUTROPHILS # BLD AUTO: 5.31 K/UL — SIGNIFICANT CHANGE UP (ref 1.8–7.4)
NEUTROPHILS NFR BLD AUTO: 67 % — SIGNIFICANT CHANGE UP (ref 43–77)
NITRITE UR-MCNC: NEGATIVE — SIGNIFICANT CHANGE UP
NRBC # BLD: 0 /100 WBCS — SIGNIFICANT CHANGE UP (ref 0–0)
PH UR: 6.5 — SIGNIFICANT CHANGE UP (ref 5–8)
PLATELET # BLD AUTO: 369 K/UL — SIGNIFICANT CHANGE UP (ref 150–400)
POTASSIUM SERPL-MCNC: SIGNIFICANT CHANGE UP (ref 3.5–5.3)
POTASSIUM SERPL-SCNC: SIGNIFICANT CHANGE UP (ref 3.5–5.3)
PROT SERPL-MCNC: 8.8 G/DL — HIGH (ref 6–8.3)
PROT UR-MCNC: 30 MG/DL
PROTHROM AB SERPL-ACNC: 11.7 SEC — SIGNIFICANT CHANGE UP (ref 9.9–13.4)
RBC # BLD: 5.18 M/UL — SIGNIFICANT CHANGE UP (ref 3.8–5.2)
RBC # FLD: 15.7 % — HIGH (ref 10.3–14.5)
SODIUM SERPL-SCNC: 137 MMOL/L — SIGNIFICANT CHANGE UP (ref 135–145)
SP GR SPEC: 1.01 — SIGNIFICANT CHANGE UP (ref 1–1.03)
UROBILINOGEN FLD QL: 0.2 MG/DL — SIGNIFICANT CHANGE UP (ref 0.2–1)
WBC # BLD: 7.93 K/UL — SIGNIFICANT CHANGE UP (ref 3.8–10.5)
WBC # FLD AUTO: 7.93 K/UL — SIGNIFICANT CHANGE UP (ref 3.8–10.5)

## 2024-11-27 PROCEDURE — 70450 CT HEAD/BRAIN W/O DYE: CPT | Mod: 26,MC

## 2024-11-27 PROCEDURE — 99285 EMERGENCY DEPT VISIT HI MDM: CPT

## 2024-11-27 NOTE — ED ADULT NURSE NOTE - OBJECTIVE STATEMENT
Received via stretcher BIBEMS with chief complaints of hypertension and headache today. Pt states "This morning I felt weak. My HHA took my blood pressure and it was 153/93. I always take my blood pressure medication."     Patient AOX4, speaking full sentences. Patient denies chest pain, shortness of breath, difficulty breathing and any form of distress not noted. Resps even and nonlabored. Moves all extremities. No obvious trauma/injury/deformity noted. Patient oriented to ED area. All needs attended. POC reviewed. Placed on continous cardiac monitoring. Fall risk precautions maintained. Yellow gown and red socks in place. Pt was instructed to ask for help to go to the bathroom, patient verbalized understanding. Purposeful proactive hourly rounding in progress.

## 2024-11-27 NOTE — ED PROVIDER NOTE - CARE PLAN
Principal Discharge DX:	Headache   1 Principal Discharge DX:	Headache  Secondary Diagnosis:	Weakness generalized

## 2024-11-27 NOTE — ED PROVIDER NOTE - CLINICAL SUMMARY MEDICAL DECISION MAKING FREE TEXT BOX
89 yo female with h/o HTN, DM, gout,, gallstone pancreatitis, s/p biliary stent placement and removal later  this year, present to Er c/o elevated BP and posterior headache. Pt states her HHA was keep checking her BP and it was higher and higher. Pt became very nervous and anxious about it. States her mouth was dry, she had SOB and upper chest discomfort.   Pt  decided to come to ER for evaluation. while in the ER pt denies HA, reports its resolved. Pt denies any CP, SOB, abdominal pain , fever, chills, vision changes, dizziness .  pt is well appearing, A&O x4, has non-ischemic ecg, has clear lungs, benign abdomen, no peripheral edema, has no focal neuro deficits. plan for labs, head CT, BP is much better now, and headache resolved w/o any medications.

## 2024-11-27 NOTE — ED PROVIDER NOTE - ATTENDING APP SHARED VISIT CONTRIBUTION OF CARE
ho of HTN, gout, gallstone / gallstone pancreatitis w stent placed / removed  now here with elevated BP over 200, associated posterior headache, felt nervous and panicky after saw elevated BP then developed chest discomfort / tightness/   exam , /81 now , headache resolved, cbta , S1 S2 , no m/r/g , abd soft / NT , neuro intact   CT head pending , EKG  / trop/s ,   likely HTN urgency , dispo pending workup / reeval.

## 2024-11-27 NOTE — ED PROVIDER NOTE - NSFOLLOWUPINSTRUCTIONS_ED_ALL_ED_FT
Thank you for visiting [facility] Emergency Department.      Please know that no emergency visit is complete without follow-up with your primary care provider in 1 week.  Please bring copies of all discharge papers and results and show to your doctor.    Please continue taking all previous medications as prescribed.      Return to ER immediately if you develop fevers, chills, chest pain, shortness of breath, worsening headache , dizziness, and/or any concerning symptoms. Thank you for visiting [facility] Emergency Department.      Please know that no emergency visit is complete without follow-up with your primary care provider in 1 week.  Please bring copies of all discharge papers and results and show to your doctor.    Please continue taking all previous medications as prescribed.      Return to ER immediately if you develop fevers, chills, chest pain, shortness of breath, worsening headache , dizziness, and/or any concerning symptoms.    Managing Your Hypertension  Hypertension, also called high blood pressure, is when the force of the blood pressing against the walls of the arteries is too strong. Arteries are blood vessels that carry blood from your heart throughout your body. Hypertension forces the heart to work harder to pump blood and may cause the arteries to become narrow or stiff.    Understanding blood pressure readings  A blood pressure reading includes a higher number over a lower number:  The first, or top, number is called the systolic pressure. It is a measure of the pressure in your arteries as your heart beats.  The second, or bottom number, is called the diastolic pressure. It is a measure of the pressure in your arteries as the heart relaxes.  For most people, a normal blood pressure is below 120/80. Your personal target blood pressure may vary depending on your medical conditions, your age, and other factors.    Blood pressure is classified into four stages. Based on your blood pressure reading, your health care provider may use the following stages to determine what type of treatment you need, if any. Systolic pressure and diastolic pressure are measured in a unit called millimeters of mercury (mmHg).    Normal    Systolic pressure: below 120.  Diastolic pressure: below 80.  Elevated    Systolic pressure: 120–129.  Diastolic pressure: below 80.  Hypertension stage 1    Systolic pressure: 130–139.  Diastolic pressure: 80–89.  Hypertension stage 2    Systolic pressure: 140 or above.  Diastolic pressure: 90 or above.  How can this condition affect me?  Managing your hypertension is very important. Over time, hypertension can damage the arteries and decrease blood flow to parts of the body, including the brain, heart, and kidneys. Having untreated or uncontrolled hypertension can lead to:  A heart attack.  A stroke.  A weakened blood vessel (aneurysm).  Heart failure.  Kidney damage.  Eye damage.  Memory and concentration problems.  Vascular dementia.  What actions can I take to manage this condition?  Hypertension can be managed by making lifestyle changes and possibly by taking medicines. Your health care provider will help you make a plan to bring your blood pressure within a normal range. You may be referred for counseling on a healthy diet and physical activity.    Nutrition    A plate with examples of foods in a healthy diet.  Eat a diet that is high in fiber and potassium, and low in salt (sodium), added sugar, and fat. An example eating plan is called the DASH diet. DASH stands for Dietary Approaches to Stop Hypertension. To eat this way:  Eat plenty of fresh fruits and vegetables. Try to fill one-half of your plate at each meal with fruits and vegetables.  Eat whole grains, such as whole-wheat pasta, brown rice, or whole-grain bread. Fill about one-fourth of your plate with whole grains.  Eat low-fat dairy products.  Avoid fatty cuts of meat, processed or cured meats, and poultry with skin. Fill about one-fourth of your plate with lean proteins such as fish, chicken without skin, beans, eggs, and tofu.  Avoid pre-made and processed foods. These tend to be higher in sodium, added sugar, and fat.  Reduce your daily sodium intake. Many people with hypertension should eat less than 1,500 mg of sodium a day.  Lifestyle    A person riding a bicycle wearing a safety helmet.  Work with your health care provider to maintain a healthy body weight or to lose weight. Ask what an ideal weight is for you.  Get at least 30 minutes of exercise that causes your heart to beat faster (aerobic exercise) most days of the week. Activities may include walking, swimming, or biking.  Include exercise to strengthen your muscles (resistance exercise), such as weight lifting, as part of your weekly exercise routine. Try to do these types of exercises for 30 minutes at least 3 days a week.  Do not use any products that contain nicotine or tobacco. These products include cigarettes, chewing tobacco, and vaping devices, such as e-cigarettes. If you need help quitting, ask your health care provider.  Control any long-term (chronic) conditions you have, such as high cholesterol or diabetes.  Identify your sources of stress and find ways to manage stress. This may include meditation, deep breathing, or making time for fun activities.  Alcohol use    Do not drink alcohol if:  Your health care provider tells you not to drink.  You are pregnant, may be pregnant, or are planning to become pregnant.  If you drink alcohol:  Limit how much you have to:  0–1 drink a day for women.  0–2 drinks a day for men.  Know how much alcohol is in your drink. In the [place] one drink equals one 12 oz bottle of beer (355 mL), one 5 oz glass of wine (148 mL), or one 1½ oz glass of hard liquor (44 mL).  Medicines    Your health care provider may prescribe medicine if lifestyle changes are not enough to get your blood pressure under control and if:  Your systolic blood pressure is 130 or higher.  Your diastolic blood pressure is 80 or higher.  Take medicines only as told by your health care provider. Follow the directions carefully. Blood pressure medicines must be taken as told by your health care provider. The medicine does not work as well when you skip doses. Skipping doses also puts you at risk for problems.    Monitoring    A person checking his or her blood pressure.   Before you monitor your blood pressure:  Do not smoke, drink caffeinated beverages, or exercise within 30 minutes before taking a measurement.  Use the bathroom and empty your bladder (urinate).  Sit quietly for at least 5 minutes before taking measurements.  Monitor your blood pressure at home as told by your health care provider. To do this:  Sit with your back straight and supported.  Place your feet flat on the floor. Do not cross your legs.  Support your arm on a flat surface, such as a table. Make sure your upper arm is at heart level.  Each time you measure, take two or three readings one minute apart and record the results.  You may also need to have your blood pressure checked regularly by your health care provider.    General information    Talk with your health care provider about your diet, exercise habits, and other lifestyle factors that may be contributing to hypertension.  Review all the medicines you take with your health care provider because there may be side effects or interactions.  Keep all follow-up visits. Your health care provider can help you create and adjust your plan for managing your high blood pressure.  Where to find more information  National Heart, Lung[employer] [place]: www.nhlbi.nih.gov  American Heart Association: www.heart.org  Contact a health care provider if:  You think you are having a reaction to medicines you have taken.  You have repeated (recurrent) headaches.  You feel dizzy.  You have swelling in your ankles.  You have trouble with your vision.  Get help right away if:  You develop a severe headache or confusion.  You have unusual weakness or numbness, or you feel faint.  You have severe pain in your chest or abdomen.  You vomit repeatedly.  You have trouble breathing.  These symptoms may be an emergency. Get help right away. Call 911.  Do not wait to see if the symptoms will go away.  Do not drive yourself to the hospital.  Summary  Hypertension is when the force of blood pumping through your arteries is too strong. If this condition is not controlled, it may put you at risk for serious complications.  Your personal target blood pressure may vary depending on your medical conditions, your age, and other factors. For most people, a normal blood pressure is less than 120/80.  Hypertension is managed by lifestyle changes, medicines, or both.  Lifestyle changes to help manage hypertension include losing weight, eating a healthy, low-sodium diet, exercising more, stopping smoking, and limiting alcohol.  This information is not intended to replace advice given to you by your health care provider. Make sure you discuss any questions you have with your health care provider.

## 2024-11-27 NOTE — ED PROVIDER NOTE - PROGRESS NOTE DETAILS
upon discharge pt became generally weak, reports slight nausea, states can't go home because she is not feeling well.

## 2024-11-27 NOTE — ED ADULT NURSE NOTE - NSFALLRISK_ED_ALL_ED
Yes DATE OF ADMISSION: 10/5/2023    DATE OF DISCHARGE: 10/7/2023    ADMITTING DIAGNOSIS: Intrauterine pregnancy     HOSPITAL COURSE: Patient was admitted to Labor and Delivery for scheduled  delivery. She underwent  delivery. Patient’s postoperative course was unremarkable and she remained hemodynamically stable and afebrile throughout. Upon discharge the patient is ambulating without assistance, voiding spontaneously, tolerating oral intake. Pain is well controlled with oral medication and vital signs are stable.    PROCEDURES PERFORMED: Low transverse  delivery.    COMPLICATIONS: None.    FINAL DIAGNOSIS: Status post repeat low transverse cearean delivery.    DISCHARGE INSTRUCTIONS: Call for increased pain, fever, or bleeding.    DIET: Advance as tolerated.    ACTIVITY: Advance as tolerated. Pelvic rest for 6 weeks. Nothing to be inserted into the vagina for 6 weeks (i.e. no tampons, douche, or sex)    MEDICATIONS AND FOLLOWUP: As above.

## 2024-11-27 NOTE — ED ADULT NURSE NOTE - NSFALLHARMRISKINTERV_ED_ALL_ED

## 2024-11-27 NOTE — ED ADULT NURSE NOTE - ISAR MEDICATION
Will place her on azithromycin for strep throat for 5 days.  Continue with Tylenol and ibuprofen for fevers or pain.  Use salt water gargles and honey mixed with tea.  Let me know if symptoms are not improving.    No

## 2024-11-27 NOTE — ED PROVIDER NOTE - OBJECTIVE STATEMENT
89 yo female with h/o HTN, DM, gout,, gallstone pancreatitis, s/p biliary stent placement and removal later  this year, present to Er c/o elevated BP and posterior headache. Pt states her HHA was keep checking her BP and it was higher and higher. Pt became very nervous and anxious about it. States her mouth was dry, she had SOB and upper chest discomfort.   Pt  decided to come to ER for evaluation. while in the ER pt denies HA, reports its resolved. Pt denies any CP, SOB, abdominal pain , fever, chills, vision changes, dizziness .

## 2024-11-27 NOTE — ED PROVIDER NOTE - PATIENT PORTAL LINK FT
You can access the FollowMyHealth Patient Portal offered by [facility] by registering at the following website: [email or web address]. By joining [employer]’s FollowMyHealth portal, you will also be able to view your health information using other applications (apps) compatible with our system.

## 2024-11-27 NOTE — ED ADULT TRIAGE NOTE - CHIEF COMPLAINT QUOTE
hypertensive, c/o  posterior headache. takes metoprolol and amlodipine in the morning. compliant with BP medications

## 2024-11-28 ENCOUNTER — TRANSCRIPTION ENCOUNTER (OUTPATIENT)
Age: 89
End: 2024-11-28

## 2024-11-28 VITALS — WEIGHT: 190.04 LBS

## 2024-11-28 DIAGNOSIS — R74.8 ABNORMAL LEVELS OF OTHER SERUM ENZYMES: ICD-10-CM

## 2024-11-28 DIAGNOSIS — I44.0 ATRIOVENTRICULAR BLOCK, FIRST DEGREE: ICD-10-CM

## 2024-11-28 DIAGNOSIS — I95.1 ORTHOSTATIC HYPOTENSION: ICD-10-CM

## 2024-11-28 DIAGNOSIS — Z29.9 ENCOUNTER FOR PROPHYLACTIC MEASURES, UNSPECIFIED: ICD-10-CM

## 2024-11-28 DIAGNOSIS — M10.9 GOUT, UNSPECIFIED: ICD-10-CM

## 2024-11-28 DIAGNOSIS — I10 ESSENTIAL (PRIMARY) HYPERTENSION: ICD-10-CM

## 2024-11-28 DIAGNOSIS — R53.1 WEAKNESS: ICD-10-CM

## 2024-11-28 DIAGNOSIS — E11.9 TYPE 2 DIABETES MELLITUS WITHOUT COMPLICATIONS: ICD-10-CM

## 2024-11-28 LAB
ADD ON TEST-SPECIMEN IN LAB: SIGNIFICANT CHANGE UP
ADD ON TEST-SPECIMEN IN LAB: SIGNIFICANT CHANGE UP
ALBUMIN SERPL ELPH-MCNC: 4 G/DL — SIGNIFICANT CHANGE UP (ref 3.3–5)
ALP SERPL-CCNC: 139 U/L — HIGH (ref 40–120)
ALT FLD-CCNC: SIGNIFICANT CHANGE UP (ref 10–45)
ANION GAP SERPL CALC-SCNC: 10 MMOL/L — SIGNIFICANT CHANGE UP (ref 5–17)
ANION GAP SERPL CALC-SCNC: 13 MMOL/L — SIGNIFICANT CHANGE UP (ref 5–17)
AST SERPL-CCNC: SIGNIFICANT CHANGE UP (ref 10–40)
BILIRUB SERPL-MCNC: 0.5 MG/DL — SIGNIFICANT CHANGE UP (ref 0.2–1.2)
BUN SERPL-MCNC: 11 MG/DL — SIGNIFICANT CHANGE UP (ref 7–23)
BUN SERPL-MCNC: 12 MG/DL — SIGNIFICANT CHANGE UP (ref 7–23)
CALCIUM SERPL-MCNC: 8.9 MG/DL — SIGNIFICANT CHANGE UP (ref 8.4–10.5)
CALCIUM SERPL-MCNC: 9.2 MG/DL — SIGNIFICANT CHANGE UP (ref 8.4–10.5)
CHLORIDE SERPL-SCNC: 103 MMOL/L — SIGNIFICANT CHANGE UP (ref 96–108)
CHLORIDE SERPL-SCNC: 104 MMOL/L — SIGNIFICANT CHANGE UP (ref 96–108)
CO2 SERPL-SCNC: 22 MMOL/L — SIGNIFICANT CHANGE UP (ref 22–31)
CO2 SERPL-SCNC: 22 MMOL/L — SIGNIFICANT CHANGE UP (ref 22–31)
CREAT SERPL-MCNC: 0.72 MG/DL — SIGNIFICANT CHANGE UP (ref 0.5–1.3)
CREAT SERPL-MCNC: 0.73 MG/DL — SIGNIFICANT CHANGE UP (ref 0.5–1.3)
EGFR: 76 ML/MIN/1.73M2 — SIGNIFICANT CHANGE UP
EGFR: 77 ML/MIN/1.73M2 — SIGNIFICANT CHANGE UP
GLUCOSE SERPL-MCNC: 113 MG/DL — HIGH (ref 70–99)
GLUCOSE SERPL-MCNC: 132 MG/DL — HIGH (ref 70–99)
MAGNESIUM SERPL-MCNC: 1.8 MG/DL — SIGNIFICANT CHANGE UP (ref 1.6–2.6)
POTASSIUM SERPL-MCNC: 4.2 MMOL/L — SIGNIFICANT CHANGE UP (ref 3.5–5.3)
POTASSIUM SERPL-MCNC: SIGNIFICANT CHANGE UP (ref 3.5–5.3)
POTASSIUM SERPL-SCNC: 4.2 MMOL/L — SIGNIFICANT CHANGE UP (ref 3.5–5.3)
POTASSIUM SERPL-SCNC: SIGNIFICANT CHANGE UP (ref 3.5–5.3)
PROT SERPL-MCNC: 8.6 G/DL — HIGH (ref 6–8.3)
SODIUM SERPL-SCNC: 136 MMOL/L — SIGNIFICANT CHANGE UP (ref 135–145)
SODIUM SERPL-SCNC: 138 MMOL/L — SIGNIFICANT CHANGE UP (ref 135–145)

## 2024-11-28 PROCEDURE — 71045 X-RAY EXAM CHEST 1 VIEW: CPT | Mod: 26

## 2024-11-28 PROCEDURE — 36415 COLL VENOUS BLD VENIPUNCTURE: CPT

## 2024-11-28 PROCEDURE — 84484 ASSAY OF TROPONIN QUANT: CPT

## 2024-11-28 PROCEDURE — 80053 COMPREHEN METABOLIC PANEL: CPT

## 2024-11-28 PROCEDURE — 85730 THROMBOPLASTIN TIME PARTIAL: CPT

## 2024-11-28 PROCEDURE — 97161 PT EVAL LOW COMPLEX 20 MIN: CPT

## 2024-11-28 PROCEDURE — 96360 HYDRATION IV INFUSION INIT: CPT

## 2024-11-28 PROCEDURE — 80076 HEPATIC FUNCTION PANEL: CPT

## 2024-11-28 PROCEDURE — 85610 PROTHROMBIN TIME: CPT

## 2024-11-28 PROCEDURE — 99236 HOSP IP/OBS SAME DATE HI 85: CPT | Mod: GC

## 2024-11-28 PROCEDURE — 82977 ASSAY OF GGT: CPT

## 2024-11-28 PROCEDURE — 84100 ASSAY OF PHOSPHORUS: CPT

## 2024-11-28 PROCEDURE — 85025 COMPLETE CBC W/AUTO DIFF WBC: CPT

## 2024-11-28 PROCEDURE — 83735 ASSAY OF MAGNESIUM: CPT

## 2024-11-28 PROCEDURE — 84443 ASSAY THYROID STIM HORMONE: CPT

## 2024-11-28 PROCEDURE — 80048 BASIC METABOLIC PNL TOTAL CA: CPT

## 2024-11-28 PROCEDURE — 83036 HEMOGLOBIN GLYCOSYLATED A1C: CPT

## 2024-11-28 PROCEDURE — 82962 GLUCOSE BLOOD TEST: CPT

## 2024-11-28 PROCEDURE — 81001 URINALYSIS AUTO W/SCOPE: CPT

## 2024-11-28 PROCEDURE — 99285 EMERGENCY DEPT VISIT HI MDM: CPT | Mod: 25

## 2024-11-28 PROCEDURE — 70450 CT HEAD/BRAIN W/O DYE: CPT | Mod: MC

## 2024-11-28 PROCEDURE — 71045 X-RAY EXAM CHEST 1 VIEW: CPT

## 2024-11-28 RX ORDER — ALLOPURINOL 300 MG/1
100 TABLET ORAL EVERY 24 HOURS
Refills: 0 | Status: DISCONTINUED | OUTPATIENT
Start: 2024-11-28 | End: 2024-11-28

## 2024-11-28 RX ORDER — 0.9 % SODIUM CHLORIDE 0.9 %
1000 INTRAVENOUS SOLUTION INTRAVENOUS
Refills: 0 | Status: DISCONTINUED | OUTPATIENT
Start: 2024-11-28 | End: 2024-11-28

## 2024-11-28 RX ORDER — ENOXAPARIN SODIUM 30 MG/.3ML
40 INJECTION SUBCUTANEOUS EVERY 24 HOURS
Refills: 0 | Status: DISCONTINUED | OUTPATIENT
Start: 2024-11-28 | End: 2024-11-28

## 2024-11-28 RX ORDER — AMLODIPINE BESYLATE 10 MG/1
10 TABLET ORAL EVERY 24 HOURS
Refills: 0 | Status: DISCONTINUED | OUTPATIENT
Start: 2024-11-28 | End: 2024-11-28

## 2024-11-28 RX ORDER — GLUCAGON INJECTION, SOLUTION 0.5 MG/.1ML
1 INJECTION, SOLUTION SUBCUTANEOUS ONCE
Refills: 0 | Status: DISCONTINUED | OUTPATIENT
Start: 2024-11-28 | End: 2024-11-28

## 2024-11-28 RX ORDER — SODIUM CHLORIDE 9 MG/ML
1000 INJECTION, SOLUTION INTRAMUSCULAR; INTRAVENOUS; SUBCUTANEOUS ONCE
Refills: 0 | Status: COMPLETED | OUTPATIENT
Start: 2024-11-28 | End: 2024-11-28

## 2024-11-28 RX ORDER — METOPROLOL TARTRATE 100 MG/1
50 TABLET, FILM COATED ORAL EVERY 24 HOURS
Refills: 0 | Status: DISCONTINUED | OUTPATIENT
Start: 2024-11-28 | End: 2024-11-28

## 2024-11-28 RX ADMIN — SODIUM CHLORIDE 1000 MILLILITER(S): 9 INJECTION, SOLUTION INTRAMUSCULAR; INTRAVENOUS; SUBCUTANEOUS at 04:31

## 2024-11-28 RX ADMIN — ALLOPURINOL 100 MILLIGRAM(S): 300 TABLET ORAL at 07:07

## 2024-11-28 RX ADMIN — Medication 125 MILLILITER(S): at 08:20

## 2024-11-28 RX ADMIN — Medication 100 GRAM(S): at 04:20

## 2024-11-28 RX ADMIN — SODIUM CHLORIDE 2000 MILLILITER(S): 9 INJECTION, SOLUTION INTRAMUSCULAR; INTRAVENOUS; SUBCUTANEOUS at 03:30

## 2024-11-28 NOTE — H&P ADULT - PROBLEM SELECTOR PLAN 2
Reports generalized weakness associated w/ orthostatic hypotension. No signs or symptoms of infection at this time. Urinary frequency/straining, however UA negative for UTI. CXR ?L pleural effusion/opacity, otherwise clear. Lives alone, has HHA 10 hrs per day Mon-Fri, ambulates with 4-prong cane and occasionally uses wheelchair.   - f/u CXR official read  - PT consulted, f/u recs  - nutrition consulted, f/u recs

## 2024-11-28 NOTE — PHYSICAL THERAPY INITIAL EVALUATION ADULT - GENERAL OBSERVATIONS, REHAB EVAL
PT IE Completed. Pt received semi-supine in bed, NAD, +hep-lock, +CB, +alarm. Cleared for PT by RN Ramón. Pt ambulates w/ quad cane.

## 2024-11-28 NOTE — DIETITIAN INITIAL EVALUATION ADULT - OTHER INFO
"89F with PMH of HTN, DM, gout, 1st degree AV block, hx of obstructive gallstone pancreatitis s/p ERCP with sphincterotomy and biliary stent placement  (8/2023) and removal (8/2024), hx ankle fracture s/p surgical repair, presenting to ED for elevated BP and headache x 1 day, found to have orthostatic hypotension and weakness, admitted to [facility] for further management. "     Visited pt at bedside on 4U for initial assessment. States that at baseline she consumes 3 meals a day with no recent changes. States that she follows a low sodium diet at home. No cultural, ethnic, [employer] food preferences noted. Confirms No known food allergies.  "89F with PMH of HTN, DM, gout, 1st degree AV block, hx of obstructive gallstone pancreatitis s/p ERCP with sphincterotomy and biliary stent placement  (8/2023) and removal (8/2024), hx ankle fracture s/p surgical repair, presenting to ED for elevated BP and headache x 1 day, found to have orthostatic hypotension and weakness, admitted to [facility] for further management. "     Visited pt at bedside on 4U for initial assessment. States that at baseline she consumes 3 meals a day with no recent changes. States that she follows a low sodium diet at home. No cultural, ethnic, Qu[employer] food preferences noted. Confirms No known food allergies. Pt reports no vitamin/mineral supplementation at home. Reports no additional use of oral nutritional supplement. No difficulties chewing or swallowing reported per pt at time of visit. No issues with N/V/C/D at this time. Last BM noted 11/28 per pt. Current diet: DASH/TLC + Consistent Carbohydrate w/ No Evening Snack. RD observed pt consuming % of meals. States that she has a good appetite. Dosing weight: 11/27 190 pounds, UBW: 180 pounds per pt, States that she has gained weight. IBW: 110 pounds, %IBW: 172%. Observed with no overt signs and symptoms of muscle or fat wasting. Based on ASPEN guidelines, pt does not meet criteria for malnutrition. 0/10 pain per flow sheets. No Edema per flow sheets. No Pressure Injuries per flow sheets. Nav Score: 18; Meds reviewed. Insulin Sliding Scale. Nutritionally Pertinent Labs 11/28 POCT 122-125 past 24 hours. Gluc: 132 (H). See Nutrition recommendations below.

## 2024-11-28 NOTE — H&P ADULT - HISTORY OF PRESENT ILLNESS
ED course:  Vitals:   Labs:   EKG:   Imaging:   Interventions:   Consults:  89F with PMH of HTN, DM, gout, gallstone pancreatitis s/p biliary stent placement and removal (2024), presenting to ED for elevated BP. Pt states her HHA checked her BP at home and found it to be high, which made her her anxious. Associated symptoms include posterior headache, dry mouth, SOB and upper chest discomfort. Upon evaluation in ED, pt denied fevers, chills, CP, SOB, abd pain, HA, vision changes, dizziness.    ED course:  Vitals: afebrile, HR 80s, /103 -> 137/92 without intervention, orthostatics positive, RR 16, SpO2 97-99% on RA.  Labs: CBC wnl. CMP s/f alk phos 139, otherwise wnl. trop T wnl. UA 30 protein, otherwise negative.  EKG: NSR with 1st degree AV block, incomplete RBBB, QTc 481  Imaging:   *CT head non con: No evidence of acute intracranial pathology; periventricular and subcortical white matter hypodensities, consistent with microvascular type changes.  *CXR: possible L pleural effusion/opacity?  Interventions: NS 1L  Consults: none 89F with PMH of HTN, DM, gout, gallstone pancreatitis s/p biliary stent placement and removal (2024), presenting to ED for elevated BP. Pt states her HHA checked her BP at home and found it to be high, which made her her anxious. Associated symptoms include posterior headache, dry mouth, SOB and upper chest discomfort. Upon evaluation in ED, pt denied fevers, chills, CP, SOB, abd pain, HA, vision changes, dizziness.    ED course:  Vitals: afebrile, HR 80s, /103 -> 137/92 without intervention, orthostatics positive, RR 16, SpO2 97-99% on RA.  Labs: CBC wnl. CMP s/f alk phos 139, otherwise wnl. trop T wnl. UA 30 protein, otherwise negative.  EKG: NSR with 1st degree AV block, incomplete RBBB, QTc 481  Imaging:   *CT head non con: No evidence of acute intracranial pathology; periventricular and subcortical white matter hypodensities, consistent with microvascular type changes.  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 Pt states her HHA checked her BP at home and found it to be high (max SBP 160s-170s). Associated symptoms include dull occipital headache described as soreness, anxiety, palpitations, difficulty sleeping, urinary frequency/straining. She also reports chronic BLE burning/stinging pain. Upon evaluation in ED, symptoms had resolved. Upon evaluation by author, pt denies fevers, chills, CP, SOB, cough, abd pain, n/v/c/d, dysuria, urinary urgency, vision changes, dizziness, lightheadedness. Pt is hoping to be discharged home today. She was originally planned for discharge from ED however she felt very weak when she stood up to go to the bathroom (denies dizziness/lightheadedness) and was orthostatic. She now feels much better. No other complaints at this time.    ED course:  Vitals: afebrile, HR 80s, /103 -> 137/92 without intervention -> orthostatics positive, RR 16, SpO2 97-99% on RA.  Labs: CBC wnl. CMP s/f alk phos 139, otherwise wnl. trop T wnl. UA 30 protein, otherwise negative.  EKG: NSR with 1st degree AV block, incomplete RBBB, QTc 481  Imaging:   *CT head non con: No evidence of acute intracranial pathology; periventricular and subcortical white matter hypodensities, consistent w/ microvascular type changes.  *CXR: possible L pleural effusion/opacity, otherwise clear  Interventions: NS 1L  Consults: none

## 2024-11-28 NOTE — PHYSICAL THERAPY INITIAL EVALUATION ADULT - ADDITIONAL COMMENTS
Pt lives alone in an elevator access apt, no JOSSELIN. Pt has HHA M-F 10 hours and has family members that come by "just about every day" to assist as needed. Pt reports using rollator for outdoor amb and has 2 canes for indoor amb (quad cane bedside). Pt also owns shower chair and w/c.

## 2024-11-28 NOTE — DIETITIAN INITIAL EVALUATION ADULT - ADD RECOMMEND
1. Continue with current diet as tolerated   2. Recommend Ensure Enlive High Protein 1x/day (350 kcal, 20 g protein in each) to optimize intake per pt preference   3. Encourage PO intake and honor food preferences as able unless otherwise contraindicated.  4. Monitor PO intake, diet tolerance, weight trends, labs, and skin integrity and bowel movement regularity.   5. RDN will continue to monitor, reassess, and intervene as appropriate.

## 2024-11-28 NOTE — H&P ADULT - PROBLEM SELECTOR PLAN 8
F: None   E: Replete as necessary K>4 Mg>2  N: passed dysphagia screen -> consistent carb/DASH diet  DVT Prophylaxis: Lovenox 40mg SQ q24h  GI prophylaxis: None   CODE STATUS: FULL CODE  Dispo: [facility]

## 2024-11-28 NOTE — DIETITIAN INITIAL EVALUATION ADULT - PROBLEM SELECTOR PLAN 6
"Pt called out to nurses station stating she felt another \"episode\" that she felt occurred at home prior to being admitted. She also complained of a headache starting to occur as it had subsided earlier. NIHSS changed from previous assessment related to R Side of mouth having a possible droop that was not there previously but otherwise scale had not changed. V/S Obtained. Glucose obtained. See mar. CHRISTEN Hernandez. While awaiting return page, patient appeared to be staring into space but was responsive to questions. Pt then started having seizure like activity and rapid response was called.  "
Hx of obstructive gallstone pancreatitis s/p ERCP with sphincterotomy and biliary stent placement  (8/2023) and removal (8/2024). On admission, alk phos 139 -> 123, AST/ALT/bilirubin wnl. No GI symptoms.  - trend LFTs  - f/u GGT

## 2024-11-28 NOTE — PHYSICAL THERAPY INITIAL EVALUATION ADULT - PERTINENT HX OF CURRENT PROBLEM, REHAB EVAL
89F with PMH of HTN, DM, gout, 1st degree AV block, hx of obstructive gallstone pancreatitis s/p ERCP with sphincterotomy and biliary stent placement  (8/2023) and removal (8/2024), hx ankle fracture s/p surgical repair, presenting to ED for elevated BP and headache x 1 day, found to have orthostatic hypotension and weakness, admitted to [facility] for further management.

## 2024-11-28 NOTE — H&P ADULT - PROBLEM SELECTOR PLAN 1
Presenting for elevated BP and headache. /103 -> 137/92 without intervention. Orthostatics positive in ED, associated w/ generalized weakness: 151/66 supine -> 106/62 sitting, 112/70 standing. S/p 1L NS in ED.  - holding antihypertensives -- restart as appropriate  - maintenance IVF with LR @ 125 cc/hr x 12 hrs  - encourage PO intake  - repeat orthostatics in AM

## 2024-11-28 NOTE — H&P ADULT - PROBLEM SELECTOR PLAN 6
Hx of obstructive gallstone pancreatitis s/p ERCP with sphincterotomy and biliary stent placement  (8/2023) and removal (8/2024). On admission, alk phos 139 -> 123, AST/ALT/bilirubin wnl. No GI symptoms.  - trend LFTs  - f/u GGT

## 2024-11-28 NOTE — H&P ADULT - PROBLEM SELECTOR PLAN 7
EKG on admission NSR with 1st degree AV block, incomplete RBBB, QTc 481. Unchanged from prior EKG in 9/2024. Trop T wnl on admission.  - continue to monitor

## 2024-11-28 NOTE — DISCHARGE NOTE PROVIDER - ATTENDING DISCHARGE PHYSICAL EXAMINATION:
******************************************************  ATTENDING ATTESTATION    I have read and agree with the resident Discharge Note above. Patient seen and discussed with resident team on the day of discharge.     Briefly, this is a 89F with PMH of HTN, DM, gout, 1st AVB, hx of obstructive gallstone pancreatitis s/p ERCP with sphincterotomy and biliary stent (8/2023) place and removal (8/2024), hx ankle fracture s/p surgical repair, presenting to ED for elevated BP and headache x 1 day, found to have orthostatic hypotension likely related to BP medications and age - now improved after fluids.     Physical Exam:  T(C): 36.9  HR: 74  BP: 151/82  RR: 18  SpO2: 98%    Gen: sitting upright in bed at time of exam  HEENT: NCAT, MMM, clear OP  Neck: supple, trachea at midline  CV: RRR, +S1/S2  Pulm: adequate respiratory effort, no increased work of breathing  Abd: soft, NTND  Skin: warm and dry, no new rashes vs prior report  Ext: WWP  Neuro: AOx3, no gross focal neurological deficits  Psych: affect and behavior appropriate    I was physically present for the evaluation and management services provided. I agree with the included history, physical, and plan which I reviewed and edited where appropriate. I spent 33 minutes on direct patient care and discharge planning with more than 50% of the visit spent on counseling and/or coordination of care.

## 2024-11-28 NOTE — DISCHARGE NOTE PROVIDER - NSDCMRMEDTOKEN_GEN_ALL_CORE_FT
allopurinol 100 mg oral tablet: 1 tab(s) orally once a day  amLODIPine 10 mg oral tablet: 1 tab(s) orally once a day  metoprolol succinate 50 mg oral tablet, extended release: 1 tab(s) orally once a day

## 2024-11-28 NOTE — DIETITIAN INITIAL EVALUATION ADULT - NS FNS DIET ORDER
Diet, DASH/TLC:   Sodium & Cholesterol Restricted  Consistent Carbohydrate {No Snacks} (CSTCHO) (11-28-24 @ 05:22)

## 2024-11-28 NOTE — H&P ADULT - PROBLEM SELECTOR PLAN 3
Home meds: toprol 50mg PO qd, amlodipine 10mg PO qd. Presenting for elevated BP and headache. /103 -> 137/92 without intervention. Orthostatics positive in ED, associated w/ generalized weakness. CT head non con: No evidence of acute intracranial pathology; periventricular and subcortical white matter hypodensities, consistent w/ microvascular type changes.  - holding antihypertensives i/s/o orthostatic hypotension  - restart as appropriate

## 2024-11-28 NOTE — H&P ADULT - ASSESSMENT
89F with PMH of HTN, DM, gout, 1st degree AV block, hx of obstructive gallstone pancreatitis s/p ERCP with sphincterotomy and biliary stent placement  (8/2023) and removal (8/2024), hx ankle fracture s/p surgical repair, presenting to ED for elevated BP and headache x 1 day, found to have orthostatic hypotension and weakness, admitted to [facility] for further management.

## 2024-11-28 NOTE — DIETITIAN INITIAL EVALUATION ADULT - EDUCATION DIETARY MODIFICATIONS
Discussed with pt about diet and nutrition. Answered questions related to nutrition and food. Pt receptive and verbalizes understanding./(2) meets goals/outcomes/verbalization

## 2024-11-28 NOTE — H&P ADULT - NSHPPHYSICALEXAM_GEN_ALL_CORE
.  VITAL SIGNS:  T(F): 98 (11-28-24 @ 03:03), Max: 98.2 (11-27-24 @ 23:28)  HR: 80 (11-28-24 @ 03:03) (80 - 82)  BP: 137/92 (11-28-24 @ 03:03) (137/92 - 193/103)  BP(mean): --  RR: 16 (11-28-24 @ 03:03) (16 - 16)  SpO2: 98% (11-28-24 @ 03:03) (97% - 99%)    PHYSICAL EXAM:    Constitutional: resting comfortably in bed; NAD  HEENT: NC/AT, PERRL, EOMI, anicteric sclera, no nasal discharge; uvula midline, no oropharyngeal erythema or exudates; MMM  Neck: supple; no JVD or thyromegaly  Respiratory: unlabored breathing, CTA B/L; no W/Rhonchi/Crackles, no retractions or use of accessory muscles   Cardiac: +S1/S2; RRR; no M/R/G; No ventricular heaves, PMI non-displaced  Gastrointestinal: soft, NT/ND; no rebound or guarding; +BSx4  Genitourinary: normal external genitalia  Back: spine midline, no bony tenderness or step-offs; no CVAT B/L  Extremities: WWP, no clubbing or cyanosis; no peripheral edema  Musculoskeletal: NROM x4; no joint swelling, tenderness or erythema  Vascular: 2+ radial, DP/PT pulses B/L  Dermatologic: skin warm, dry and intact; no rashes, wounds, or scars  Lymphatic: no submandibular or cervical LAD  Neurologic: AAOx3; CNII-XII grossly intact; no focal deficits  Psychiatric: affect and characteristics of appearance, verbalizations, behaviors are appropriate, denies SI/HI/AH/VH .  VITAL SIGNS:  T(F): 98 (11-28-24 @ 03:03), Max: 98.2 (11-27-24 @ 23:28)  HR: 80 (11-28-24 @ 03:03) (80 - 82)  BP: 137/92 (11-28-24 @ 03:03) (137/92 - 193/103)  BP(mean): --  RR: 16 (11-28-24 @ 03:03) (16 - 16)  SpO2: 98% (11-28-24 @ 03:03) (97% - 99%)    PHYSICAL EXAM:    Constitutional: resting comfortably in bed; NAD  HEENT: NC/AT, EOMI, anicteric sclera, no nasal discharge; MMM  Neck: supple  Respiratory: unlabored breathing, CTA B/L; no W/Rhonchi/Crackles  Cardiac: +S1/S2; RRR; no M/R/G  Gastrointestinal: soft, NT/ND; no rebound or guarding  Extremities: WWP, no clubbing or cyanosis; no peripheral edema  Musculoskeletal: NROM x4; no joint swelling, tenderness or erythema  Vascular: intact distal pulses  Neurologic: AAOx4; CNII-XII grossly intact; no focal deficits  Psychiatric: affect and characteristics of appearance, verbalizations, behaviors are appropriate

## 2024-11-28 NOTE — DISCHARGE NOTE PROVIDER - HOSPITAL COURSE
#Discharge: do not delete    89F with PMH of HTN, DM, gout, 1st degree AV block, hx of obstructive gallstone pancreatitis s/p ERCP with sphincterotomy and biliary stent placement  (8/2023) and removal (8/2024), hx ankle fracture s/p surgical repair, presenting to ED for elevated BP and headache x 1 day, found to have orthostatic hypotension and weakness, admitted to [facility] for further management.      Hospital course (by problem):      Problem/Plan - 1:  ·  Problem: Orthostatic hypotension.   ·  Plan: Presenting for elevated BP and headache. /103 -> 137/92 without intervention. Orthostatics positive in ED, associated w/ generalized weakness: 151/66 supine -> 106/62 sitting, 112/70 standing. S/p 1L NS in ED.  - holding antihypertensives -- restart as appropriate  - maintenance IVF with LR @ 125 cc/hr x 12 hrs  - encourage PO intake  - repeat orthostatics in AM.     Problem/Plan - 2:  ·  Problem: Generalized weakness.   ·  Plan: Reports generalized weakness associated w/ orthostatic hypotension. No signs or symptoms of infection at this time. Urinary frequency/straining, however UA negative for UTI. CXR ?L pleural effusion/opacity, otherwise clear. Lives alone, has HHA 10 hrs per day Mon-Fri, ambulates with 4-prong cane and occasionally uses wheelchair.   - f/u CXR official read  - PT consulted, f/u recs  - nutrition consulted, f/u recs.     Problem/Plan - 3:  ·  Problem: HTN (hypertension).   ·  Plan: Home meds: toprol 50mg PO qd, amlodipine 10mg PO qd. Presenting for elevated BP and headache. /103 -> 137/92 without intervention. Orthostatics positive in ED, associated w/ generalized weakness. CT head non con: No evidence of acute intracranial pathology; periventricular and subcortical white matter hypodensities, consistent w/ microvascular type changes.  - holding antihypertensives i/s/o orthostatic hypotension  - restart as appropriate.     Problem/Plan - 4:  ·  Problem: Gout.   ·  Plan: Home meds: allopurinol 100mg PO qd.  - c/w allopurinol 100mg PO qd.     Problem/Plan - 5:  ·  Problem: DM (diabetes mellitus).   ·  Plan: Diet-controlled.  - f/u A1C  - mISS  - monitor FS  - consistent carb/DASH diet.     Problem/Plan - 6:  ·  Problem: Elevated alkaline phosphatase level.   ·  Plan: Hx of obstructive gallstone pancreatitis s/p ERCP with sphincterotomy and biliary stent placement  (8/2023) and removal (8/2024). On admission, alk phos 139 -> 123, AST/ALT/bilirubin wnl. No GI symptoms.  - trend LFTs  - f/u GGT.     Problem/Plan - 7:  ·  Problem: 1st degree AV block.   ·  Plan: EKG on admission NSR with 1st degree AV block, incomplete RBBB, QTc 481. Unchanged from prior EKG in 9/2024. Trop T wnl on admission.  - continue to monitor.     Problem/Plan - 8:  ·  Problem: Prophylactic measure.   ·  Plan: F: None   E: Replete as necessary K>4 Mg>2  N: passed dysphagia screen -> consistent carb/DASH diet  DVT Prophylaxis: Lovenox 40mg SQ q24h  GI prophylaxis: None   CODE STATUS: FULL CODE  Dispo: F.      Patient was discharged to: (home/SHEN/acute rehab/hospice, etc, and with what services – home health PT/RN? Home O2?)    New medications:   Changes to old medications:  Medications that were stopped:    Items to follow up as outpatient:    Physical exam at the time of discharge:    Constitutional: resting comfortably in bed; NAD  Head: NC/AT  Eyes: PERRL, EOMI, anicteric sclera  ENT: no nasal discharge; MMM  Neck: supple; no JVD or thyromegaly  Respiratory: CTA B/L; no W/R/R, no retractions  Cardiac: +S1/S2; RRR; no M/R/G  Gastrointestinal: abdomen soft, NT/ND; no rebound or guarding; +BSx4  Back: spine midline, no bony tenderness or step-offs; no CVAT B/L  Extremities: WWP, no clubbing or cyanosis; no peripheral edema  Musculoskeletal: NROM x4; no joint swelling, tenderness or erythema  Vascular: 2+ radial, DP/PT pulses B/L  Dermatologic: skin warm, dry and intact; no rashes, wounds, or scars  Lymphatic: no submandibular or cervical LAD  Neurologic: AAOx3; CNII-XII grossly intact; no focal deficits  Psychiatric: affect and characteristics of appearance, verbalizations, behaviors are appropriate #Discharge: do not delete    89F with PMH of HTN, DM, gout, 1st degree AV block, hx of obstructive gallstone pancreatitis s/p ERCP with sphincterotomy and biliary stent placement (8/2023) and removal (8/2024), hx ankle fracture s/p surgical repair, presenting to ED for elevated BP and headache x1 day, found to have orthostatic hypotension and weakness, admitted to [facility] for further management, found to likely have age related autonomic incompetence and possible hypovolemia, and needs outpatient follow up with her PCP and seen by PT who recommended SH[facility], with resolution of orthostatic hypotension by day after presentation, ready for discharge.     Hospital course (by problem):      Problem/Plan - 1:  ·  Problem: Orthostatic hypotension.   ·  Plan: Presenting for elevated BP and headache. /103 -> 137/92 without intervention. Orthostatics positive in ED, associated w/ generalized weakness: 151/66 supine -> 106/62 sitting, 112/70 standing. S/p 1L NS in ED.  - seen by PT, who recommended home PT  - holding antihypertensives -- restart as appropriate  - maintenance IVF with LR @ 125 cc/hr x 12 hrs  - encourage PO intake     Problem/Plan - 2:  ·  Problem: Generalized weakness.   ·  Plan: Reports generalized weakness associated w/ orthostatic hypotension. No signs or symptoms of infection at this time. Urinary frequency/straining, however UA negative for UTI. CXR ?L pleural effusion/opacity, otherwise clear. Lives alone, has HHA 10 hrs per day Mon-Fri, ambulates with 4-prong cane and occasionally uses wheelchair.   - f/u CXR official read  - PT consulted, f/u recs  - nutrition consulted, f/u recs.     Problem/Plan - 3:  ·  Problem: HTN (hypertension).   ·  Plan: Home meds: toprol 50mg PO qd, amlodipine 10mg PO qd. Presenting for elevated BP and headache. /103 -> 137/92 without intervention. Orthostatics positive in ED, associated w/ generalized weakness. CT head non con: No evidence of acute intracranial pathology; periventricular and subcortical white matter hypodensities, consistent w/ microvascular type changes.  - holding antihypertensives i/s/o orthostatic hypotension  - restart as appropriate.     Problem/Plan - 4:  ·  Problem: Gout.   ·  Plan: Home meds: allopurinol 100mg PO qd.  - c/w allopurinol 100mg PO qd.     Problem/Plan - 5:  ·  Problem: DM (diabetes mellitus).   ·  Plan: Diet-controlled.  - f/u A1C  - mISS  - monitor FS  - consistent carb/DASH diet.     Problem/Plan - 6:  ·  Problem: Elevated alkaline phosphatase level.   ·  Plan: Hx of obstructive gallstone pancreatitis s/p ERCP with sphincterotomy and biliary stent placement  (8/2023) and removal (8/2024). On admission, alk phos 139 -> 123, AST/ALT/bilirubin wnl. No GI symptoms.  - trend LFTs  - f/u GGT.     Problem/Plan - 7:  ·  Problem: 1st degree AV block.   ·  Plan: EKG on admission NSR with 1st degree AV block, incomplete RBBB, QTc 481. Unchanged from prior EKG in 9/2024. Trop T wnl on admission.  - continue to monitor.     Problem/Plan - 8:  ·  Problem: Prophylactic measure.   ·  Plan: F: None   E: Replete as necessary K>4 Mg>2  N: passed dysphagia screen -> consistent carb/DASH diet  DVT Prophylaxis: Lovenox 40mg SQ q24h  GI prophylaxis: None   CODE STATUS: FULL CODE  Dispo: [facility].    Patient was discharged to: home with home PT    New medications: none  Changes to old medications: none  Medications that were stopped: none    Items to follow up as outpatient: PCP    Physical exam at the time of discharge:  Constitutional: resting comfortably in bed; NAD  Head: NC/AT  Eyes: PERRL, EOMI, anicteric sclera  ENT: no nasal discharge; MMM  Neck: supple; no JVD or thyromegaly  Respiratory: CTA B/L; no W/R/R, no retractions  Cardiac: +S1/S2; RRR; no M/R/G  Gastrointestinal: abdomen soft, NT/ND; no rebound or guarding; +BSx4  Back: spine midline, no bony tenderness or step-offs; no CVAT B/L  Extremities: WWP, no clubbing or cyanosis; no peripheral edema  Musculoskeletal: NROM x4; no joint swelling, tenderness or erythema  Vascular: 2+ radial, DP/PT pulses B/L  Dermatologic: skin warm, dry and intact; no rashes, wounds, or scars  Lymphatic: no submandibular or cervical LAD  Neurologic: AAOx3; CNII-XII grossly intact; no focal deficits  Psychiatric: affect and characteristics of appearance, verbalizations, behaviors are appropriate

## 2024-11-28 NOTE — DISCHARGE NOTE NURSING/CASE MANAGEMENT/SOCIAL WORK - PATIENT PORTAL LINK FT
You can access the FollowMyHealth Patient Portal offered by [facility] by registering at the following website: [email or web address]. By joining [employer]’s FollowMyHealth portal, you will also be able to view your health information using other applications (apps) compatible with our system.

## 2024-11-28 NOTE — DISCHARGE NOTE PROVIDER - NSDCCPCAREPLAN_GEN_ALL_CORE_FT
PRINCIPAL DISCHARGE DIAGNOSIS  Diagnosis: Orthostatic hypotension  Assessment and Plan of Treatment: Blood pressure is a measurement of how strongly, or weakly, your circulating blood is pressing against the walls of your arteries. Orthostatic hypotension is a drop in blood pressure that can happen when you change positions, such as when you go from lying down to standing. Please be sure to follow up with your primary care doctor.      SECONDARY DISCHARGE DIAGNOSES  Diagnosis: Weakness generalized  Assessment and Plan of Treatment:

## 2024-11-28 NOTE — H&P ADULT - ATTENDING COMMENTS
89F with PMH of HTN, DM, gout, 1st AVB, hx of obstructive gallstone pancreatitis s/p ERCP with sphincterotomy and biliary stent (8/2023) place and removal (8/2024), hx ankle fracture s/p surgical repair, presenting to ED for elevated BP and headache x 1 day, found to have orthostatic hypotension likely related to BP medications and age - now improved after fluids.     Physical exam: well-appearing sitting in bed, heart RRR, lungs CTAB, abdomen soft nontender nondistended, no LE edema    Plan:   #Hypertension  #Orthostatic hypotension  - Patient reports having had issues with high and low BP in the last - elevated BP at home was SBP 160s systolic. Denies any recent illness or decreased PO intake. Likely has some autonomic dysfunction due to age.   - Repeat orthostatics negative after IVF  - Evaluated by PT - recommended for home  - Stable for discharge home with home PT

## 2024-11-28 NOTE — H&P ADULT - NSHPSOCIALHISTORY_GEN_ALL_CORE
Former smoker, quit 22 years ago. Drinks alcohol very occasionally (last drink was wine at [place] 2023). Denies recreational drug use. Lives alone, has HHA 10 hrs per day Mon-Fri, ambulates with 4-prong cane and occasionally uses wheelchair. Has grandson (Giancarlo Rodriguez), niece, nephew, etc who visit frequently.

## 2024-11-28 NOTE — DIETITIAN INITIAL EVALUATION ADULT - PERTINENT LABORATORY DATA
11-28    136  |  104  |  11  ----------------------------<  132[H]  4.2   |  22  |  0.72    Ca    8.9      28 Nov 2024 00:31  Phos  3.2     11-28  Mg     1.8     11-28    TPro  7.8  /  Alb  3.8  /  TBili  0.6  /  DBili  0.2  /  AST  15  /  ALT  9[L]  /  AlkPhos  123[H]  11-28  POCT Blood Glucose.: 122 mg/dL (11-28-24 @ 09:15)  A1C with Estimated Average Glucose Result: 6.2 % (11-27-24 @ 22:30)

## 2024-11-28 NOTE — DIETITIAN INITIAL EVALUATION ADULT - OTHER CALCULATIONS
Ideal body weight (49.8kg) used to calculate energy needs due to pt's current body weight exceeds % (172%) ideal body weight.

## 2024-11-28 NOTE — DIETITIAN INITIAL EVALUATION ADULT - REASON INDICATOR FOR ASSESSMENT
Nutrition consult warranted for: Assessment    Information obtained from: electronic medical record and patient  Chart reviewed, events noted    **INCOMPLETE NOTE**  Nutrition consult warranted for: Assessment    Information obtained from: electronic medical record and patient  Chart reviewed, events noted

## 2024-11-28 NOTE — DIETITIAN INITIAL EVALUATION ADULT - PERTINENT MEDS FT
MEDICATIONS  (STANDING):  allopurinol 100 milliGRAM(s) Oral every 24 hours  dextrose 5%. 1000 milliLiter(s) (100 mL/Hr) IV Continuous <Continuous>  dextrose 5%. 1000 milliLiter(s) (50 mL/Hr) IV Continuous <Continuous>  dextrose 50% Injectable 25 Gram(s) IV Push once  dextrose 50% Injectable 12.5 Gram(s) IV Push once  dextrose 50% Injectable 25 Gram(s) IV Push once  enoxaparin Injectable 40 milliGRAM(s) SubCutaneous every 24 hours  glucagon  Injectable 1 milliGRAM(s) IntraMuscular once  insulin lispro (ADMELOG) corrective regimen sliding scale   SubCutaneous three times a day before meals  insulin lispro (ADMELOG) corrective regimen sliding scale   SubCutaneous at bedtime    MEDICATIONS  (PRN):  dextrose Oral Gel 15 Gram(s) Oral once PRN Blood Glucose LESS THAN 70 milliGRAM(s)/deciliter

## 2024-11-28 NOTE — H&P ADULT - NSHPLABSRESULTS_GEN_ALL_CORE
.  LABS:                         15.0   7.93  )-----------( 369      ( 27 Nov 2024 22:30 )             45.6     11-28    136  |  104  |  11  ----------------------------<  132[H]  4.2   |  22  |  0.72    Ca    8.9      28 Nov 2024 00:31  Mg     1.8     11-28    TPro  7.8  /  Alb  3.8  /  TBili  0.6  /  DBili  0.2  /  AST  15  /  ALT  9[L]  /  AlkPhos  123[H]  11-28    PT/INR - ( 27 Nov 2024 22:30 )   PT: 11.7 sec;   INR: 1.02          PTT - ( 27 Nov 2024 22:30 )  PTT:26.3 sec  Urinalysis Basic - ( 28 Nov 2024 00:31 )    Color: x / Appearance: x / SG: x / pH: x  Gluc: 132 mg/dL / Ketone: x  / Bili: x / Urobili: x   Blood: x / Protein: x / Nitrite: x   Leuk Esterase: x / RBC: x / WBC x   Sq Epi: x / Non Sq Epi: x / Bacteria: x                RADIOLOGY, EKG & ADDITIONAL TESTS: Reviewed.

## 2024-11-28 NOTE — DIETITIAN INITIAL EVALUATION ADULT - CALCULATED TO (G/KG)
Anesthesia Evaluation     Patient summary reviewed and Nursing notes reviewed   no history of anesthetic complications:  NPO Status: > 8 hours   Airway   Mallampati: II  TM distance: >3 FB  Neck ROM: full  no difficulty expected  Dental - normal exam     Pulmonary - normal exam    breath sounds clear to auscultation  (+) recent URI resolved,   (-) rhonchi, decreased breath sounds, wheezes, rales, stridor  Cardiovascular - normal exam    Rhythm: regular  Rate: normal    (+) hypertension,   (-) murmur, weak pulses, friction rub, systolic click, carotid bruits, JVD, peripheral edema      Neuro/Psych- negative ROS  GI/Hepatic/Renal/Endo    (+)  GERD, hypothyroidism,     Musculoskeletal     Abdominal  - normal exam    Abdomen: soft.   Substance History - negative use     OB/GYN negative ob/gyn ROS         Other   (+) arthritis                                 Anesthesia Plan    ASA 3     MAC     intravenous induction   Anesthetic plan and risks discussed with patient.       59.76

## 2024-12-04 DIAGNOSIS — I95.1 ORTHOSTATIC HYPOTENSION: ICD-10-CM

## 2024-12-04 DIAGNOSIS — E11.9 TYPE 2 DIABETES MELLITUS WITHOUT COMPLICATIONS: ICD-10-CM

## 2024-12-04 DIAGNOSIS — H54.42A5 BLINDNESS LEFT EYE CATEGORY 5, NORMAL VISION RIGHT EYE: ICD-10-CM

## 2024-12-04 DIAGNOSIS — R74.8 ABNORMAL LEVELS OF OTHER SERUM ENZYMES: ICD-10-CM

## 2024-12-04 DIAGNOSIS — E86.1 HYPOVOLEMIA: ICD-10-CM

## 2024-12-04 DIAGNOSIS — I44.0 ATRIOVENTRICULAR BLOCK, FIRST DEGREE: ICD-10-CM

## 2024-12-04 DIAGNOSIS — G90.89 OTHER DISORDERS OF AUTONOMIC NERVOUS SYSTEM: ICD-10-CM

## 2024-12-04 DIAGNOSIS — M10.9 GOUT, UNSPECIFIED: ICD-10-CM

## 2024-12-04 DIAGNOSIS — I10 ESSENTIAL (PRIMARY) HYPERTENSION: ICD-10-CM

## 2024-12-04 DIAGNOSIS — E78.5 HYPERLIPIDEMIA, UNSPECIFIED: ICD-10-CM

## 2025-05-18 NOTE — ED ADULT NURSE NOTE - MODE OF DISCHARGE
Outreach attempt was made to schedule a Medicare Wellness Visit. This was the first attempt. Contact was made, [facility] appointment scheduled.   hide Wheelchair

## (undated) DEVICE — FORCEP RESCUE COMBO

## (undated) DEVICE — ELCTR GROUNDING PAD ADULT COVIDIEN